# Patient Record
Sex: MALE | Race: WHITE | Employment: OTHER | URBAN - METROPOLITAN AREA
[De-identification: names, ages, dates, MRNs, and addresses within clinical notes are randomized per-mention and may not be internally consistent; named-entity substitution may affect disease eponyms.]

---

## 2024-05-05 ENCOUNTER — HOSPITAL ENCOUNTER (EMERGENCY)
Age: 79
Discharge: HOME | End: 2024-05-05
Attending: EMERGENCY MEDICINE
Payer: MEDICARE

## 2024-05-05 ENCOUNTER — APPOINTMENT (OUTPATIENT)
Age: 79
End: 2024-05-05
Payer: MEDICARE

## 2024-05-05 VITALS
HEART RATE: 88 BPM | TEMPERATURE: 98 F | BODY MASS INDEX: 35.26 KG/M2 | OXYGEN SATURATION: 90 % | DIASTOLIC BLOOD PRESSURE: 79 MMHG | WEIGHT: 199 LBS | RESPIRATION RATE: 21 BRPM | HEIGHT: 63 IN | SYSTOLIC BLOOD PRESSURE: 138 MMHG

## 2024-05-05 DIAGNOSIS — R73.9 HYPERGLYCEMIA: ICD-10-CM

## 2024-05-05 DIAGNOSIS — N39.0 ACUTE UTI: Primary | ICD-10-CM

## 2024-05-05 DIAGNOSIS — R19.7 DIARRHEA, UNSPECIFIED TYPE: ICD-10-CM

## 2024-05-05 DIAGNOSIS — R91.1 PULMONARY NODULE: ICD-10-CM

## 2024-05-05 LAB
ALBUMIN SERPL-MCNC: 4.6 G/DL (ref 3.4–5)
ALBUMIN/GLOB SERPL: 1.7 {RATIO}
ALP SERPL-CCNC: 80 U/L (ref 40–129)
ALT SERPL-CCNC: 12 U/L (ref 10–40)
ANION GAP SERPL CALCULATED.3IONS-SCNC: 15 MMOL/L (ref 3–16)
AST SERPL-CCNC: 21 U/L (ref 15–37)
BASOPHILS # BLD: 0.02 K/UL (ref 0–0.2)
BASOPHILS NFR BLD: 0 %
BILIRUB SERPL-MCNC: 2.6 MG/DL (ref 0–1)
BILIRUB UR QL STRIP: NEGATIVE
BUN SERPL-MCNC: 12 MG/DL (ref 7–20)
CALCIUM SERPL-MCNC: 9.8 MG/DL (ref 8.3–10.6)
CHARACTER UR: ABNORMAL
CHLORIDE SERPL-SCNC: 100 MMOL/L (ref 99–110)
CLARITY UR: CLEAR
CO2 SERPL-SCNC: 22 MMOL/L (ref 21–32)
COLOR UR: YELLOW
CREAT SERPL-MCNC: 0.8 MG/DL (ref 0.8–1.3)
EOSINOPHIL # BLD: 0.1 K/UL (ref 0–0.6)
EOSINOPHILS RELATIVE PERCENT: 1 %
EPI CELLS #/AREA URNS HPF: ABNORMAL /HPF
ERYTHROCYTE [DISTWIDTH] IN BLOOD BY AUTOMATED COUNT: 12.6 % (ref 12.4–15.4)
GFR, ESTIMATED: 89 ML/MIN/1.73M2
GLUCOSE SERPL-MCNC: 202 MG/DL (ref 70–99)
GLUCOSE UR STRIP-MCNC: >=1000 MG/DL
HCT VFR BLD AUTO: 44 % (ref 40.5–52.5)
HGB BLD-MCNC: 15.8 G/DL (ref 13.5–17.5)
HGB UR QL STRIP.AUTO: NEGATIVE
IMM GRANULOCYTES # BLD AUTO: 0.03 K/UL (ref 0–0.5)
IMM GRANULOCYTES NFR BLD: 0 %
KETONES UR STRIP-MCNC: 15 MG/DL
LEUKOCYTE ESTERASE UR QL STRIP: ABNORMAL
LYMPHOCYTES NFR BLD: 1.51 K/UL (ref 1–5.1)
LYMPHOCYTES RELATIVE PERCENT: 10 %
MCH RBC QN AUTO: 31.1 PG (ref 26–34)
MCHC RBC AUTO-ENTMCNC: 35.9 G/DL (ref 31–36)
MCV RBC AUTO: 86.6 FL (ref 80–100)
MONOCYTES NFR BLD: 0.92 K/UL (ref 0–1.3)
MONOCYTES NFR BLD: 6 %
NEUTROPHILS NFR BLD: 84 %
NEUTS SEG NFR BLD: 13.12 K/UL (ref 1.7–7.7)
NITRITE UR QL STRIP: NEGATIVE
PH UR STRIP: 7 [PH] (ref 5–8)
PLATELET # BLD AUTO: 324 K/UL (ref 135–450)
PMV BLD AUTO: 8.8 FL
POTASSIUM SERPL-SCNC: 3.7 MMOL/L (ref 3.5–5.1)
PROT SERPL-MCNC: 7.3 G/DL (ref 6.4–8.2)
PROT UR STRIP-MCNC: NEGATIVE MG/DL
RBC # BLD AUTO: 5.08 M/UL (ref 4.2–5.9)
RBC #/AREA URNS HPF: ABNORMAL /HPF
SODIUM SERPL-SCNC: 137 MMOL/L (ref 136–145)
SP GR UR STRIP: 1.01 (ref 1–1.03)
TROPONIN I SERPL HS-MCNC: 15 NG/L (ref 0–22)
UROBILINOGEN UR STRIP-ACNC: 1 EU/DL (ref 0–1)
WBC #/AREA URNS HPF: ABNORMAL /HPF
WBC OTHER # BLD: 15.7 K/UL (ref 4–11)

## 2024-05-05 PROCEDURE — 87086 URINE CULTURE/COLONY COUNT: CPT

## 2024-05-05 PROCEDURE — 74177 CT ABD & PELVIS W/CONTRAST: CPT

## 2024-05-05 PROCEDURE — 6360000004 HC RX CONTRAST MEDICATION: Performed by: EMERGENCY MEDICINE

## 2024-05-05 PROCEDURE — 87077 CULTURE AEROBIC IDENTIFY: CPT

## 2024-05-05 PROCEDURE — 6360000002 HC RX W HCPCS: Performed by: EMERGENCY MEDICINE

## 2024-05-05 PROCEDURE — 84484 ASSAY OF TROPONIN QUANT: CPT

## 2024-05-05 PROCEDURE — 81001 URINALYSIS AUTO W/SCOPE: CPT

## 2024-05-05 PROCEDURE — 99285 EMERGENCY DEPT VISIT HI MDM: CPT

## 2024-05-05 PROCEDURE — 96374 THER/PROPH/DIAG INJ IV PUSH: CPT

## 2024-05-05 PROCEDURE — 85025 COMPLETE CBC W/AUTO DIFF WBC: CPT

## 2024-05-05 PROCEDURE — 2580000003 HC RX 258: Performed by: EMERGENCY MEDICINE

## 2024-05-05 PROCEDURE — 71046 X-RAY EXAM CHEST 2 VIEWS: CPT

## 2024-05-05 PROCEDURE — 6370000000 HC RX 637 (ALT 250 FOR IP): Performed by: EMERGENCY MEDICINE

## 2024-05-05 PROCEDURE — 93005 ELECTROCARDIOGRAM TRACING: CPT | Performed by: EMERGENCY MEDICINE

## 2024-05-05 PROCEDURE — 80053 COMPREHEN METABOLIC PANEL: CPT

## 2024-05-05 RX ORDER — LISINOPRIL 10 MG/1
10 TABLET ORAL DAILY
COMMUNITY

## 2024-05-05 RX ORDER — SOTALOL HYDROCHLORIDE 80 MG/1
80 TABLET ORAL 2 TIMES DAILY
COMMUNITY

## 2024-05-05 RX ORDER — NIFEDIPINE 30 MG
30 TABLET, EXTENDED RELEASE ORAL DAILY
COMMUNITY

## 2024-05-05 RX ORDER — NITROFURANTOIN 25; 75 MG/1; MG/1
100 CAPSULE ORAL
Status: COMPLETED | OUTPATIENT
Start: 2024-05-05 | End: 2024-05-05

## 2024-05-05 RX ORDER — ATORVASTATIN CALCIUM 20 MG/1
20 TABLET, FILM COATED ORAL EVERY EVENING
COMMUNITY

## 2024-05-05 RX ORDER — MELOXICAM 15 MG/1
15 TABLET ORAL DAILY
COMMUNITY

## 2024-05-05 RX ORDER — GLIPIZIDE 10 MG/1
10 TABLET ORAL DAILY
COMMUNITY

## 2024-05-05 RX ORDER — NITROFURANTOIN 25; 75 MG/1; MG/1
100 CAPSULE ORAL 2 TIMES DAILY
Qty: 10 CAPSULE | Refills: 0 | Status: SHIPPED | OUTPATIENT
Start: 2024-05-05 | End: 2024-05-10

## 2024-05-05 RX ORDER — 0.9 % SODIUM CHLORIDE 0.9 %
500 INTRAVENOUS SOLUTION INTRAVENOUS ONCE
Status: COMPLETED | OUTPATIENT
Start: 2024-05-05 | End: 2024-05-05

## 2024-05-05 RX ORDER — MONTELUKAST SODIUM 10 MG/1
10 TABLET ORAL DAILY
COMMUNITY

## 2024-05-05 RX ORDER — ONDANSETRON 2 MG/ML
4 INJECTION INTRAMUSCULAR; INTRAVENOUS ONCE
Status: COMPLETED | OUTPATIENT
Start: 2024-05-05 | End: 2024-05-05

## 2024-05-05 RX ADMIN — SODIUM CHLORIDE 500 ML: 9 INJECTION, SOLUTION INTRAVENOUS at 14:04

## 2024-05-05 RX ADMIN — NITROFURANTOIN MONOHYDRATE/MACROCRYSTALS 100 MG: 25; 75 CAPSULE ORAL at 14:26

## 2024-05-05 RX ADMIN — ONDANSETRON 4 MG: 2 INJECTION INTRAMUSCULAR; INTRAVENOUS at 14:05

## 2024-05-05 RX ADMIN — IOPAMIDOL 75 ML: 755 INJECTION, SOLUTION INTRAVENOUS at 14:36

## 2024-05-05 ASSESSMENT — PAIN SCALES - GENERAL
PAINLEVEL_OUTOF10: 8
PAINLEVEL_OUTOF10: 4

## 2024-05-05 ASSESSMENT — PAIN DESCRIPTION - DESCRIPTORS: DESCRIPTORS: DISCOMFORT

## 2024-05-05 ASSESSMENT — PAIN DESCRIPTION - LOCATION: LOCATION: GENERALIZED

## 2024-05-05 ASSESSMENT — PAIN - FUNCTIONAL ASSESSMENT: PAIN_FUNCTIONAL_ASSESSMENT: 0-10

## 2024-05-05 NOTE — PROGRESS NOTES
[facility]    Pharmacy Progress Note    Admit Date: 5/5/2024    List of of current medications patient is taking is in progress. Home Medication list in EPIC updated to reflect changes noted below.    Source of information: Care Everywhere and Dispense Fill History     Patient's home pharmacy: [facility] ([phone])    Changes made to medication list:   Medications removed: (include reason, ex: therapy completed, patient no longer taking, etc.)  None  Medications added:   Atorvastatin  Glipizide  Lisinopril  Meloxicam  Montelukast  Metformin  Nifedipine  Rivaroxaban  Sotalol  Medication doses adjusted:   None  Other notes:   All medications added had recent fill history and were documented in office visit summary from [facility] ([facility]) on 04/16/24    Current Outpatient Medications   Medication Instructions    atorvastatin (LIPITOR) 20 mg, Oral, EVERY EVENING    glipiZIDE (GLUCOTROL) 10 mg, Oral, DAILY    lisinopril (PRINIVIL;ZESTRIL) 10 mg, Oral, DAILY    meloxicam (MOBIC) 15 mg, Oral, DAILY    metFORMIN (GLUCOPHAGE) 1,000 mg, Oral, DAILY    montelukast (SINGULAIR) 10 mg, Oral, DAILY    NIFEdipine (ADALAT CC) 30 mg, Oral, DAILY    rivaroxaban (XARELTO) 20 mg, Oral, DAILY WITH DINNER    sotalol (BETAPACE) 80 mg, Oral, 2 TIMES DAILY       Please call with any questions.    Babatunde Marion, Dino  [facility]   [id number]  5/5/2024   2:09 PM

## 2024-05-05 NOTE — ED PROVIDER NOTES
[facility] EMERGENCY DEPT     EMERGENCY DEPARTMENT ENCOUNTER            Pt Name: Osmar Mckenna   MRN: [id number]   Birthdate 1945   Date of evaluation: 5/5/2024   Provider: Ted Tolentino MD   PCP: Masoud Garcia MD   Note Started: 2:21 PM EDT 5/5/24          CHIEF COMPLAINT     Chief Complaint   Patient presents with    Chest Pain     Chest/back pain, dizziness, and diarrhea x 2 weeks.              HISTORY OF PRESENT ILLNESS:   History from : Patient   Limitations to history : None     Osmar Mckenna is a 79 y.o. male who presents to the emergency department with right lower quadrant pain, malaise and fatigue, intermittent diarrhea and chronic complaints of chest and back pain.  He is accompanied by his wife who states that the biggest concern is related to his abdomen as he has had some sort of bowel related issues in the past.  He reports no fever or chills.  No nausea or vomiting.  He has had a few episodes of nonbloody voluminous diarrhea.  He reports feeling hot and having chills but has not taken his temperature.    Nursing Notes were all reviewed and agreed with, or any disagreements were addressed in the HPI.     Review of Systems   Constitutional:  Positive for chills and fatigue.   HENT: Negative.     Eyes: Negative.    Respiratory: Negative.     Cardiovascular:  Positive for chest pain.   Gastrointestinal:  Positive for diarrhea. Negative for abdominal pain.   Endocrine: Negative.    Genitourinary: Negative.    Musculoskeletal: Negative.    Neurological: Negative.    All other systems reviewed and are negative.        MEDICAL HISTORY   has a past medical history of Diabetes mellitus (HCC) and Hypertension.    Past Surgical History:   Procedure Laterality Date    PACEMAKER PLACEMENT        CURRENTMEDICATIONS       Previous Medications    ATORVASTATIN (LIPITOR) 20 MG TABLET    Take 1 tablet by mouth every evening    GLIPIZIDE (GLUCOTROL) 10 MG TABLET    Take 1 tablet by

## 2024-05-05 NOTE — DISCHARGE INSTRUCTIONS
Be sure to contact your primary care physician about the pulmonary nodule follow-up we discovered today.

## 2024-05-06 LAB
EKG ATRIAL RATE: 100 BPM
EKG DIAGNOSIS: NORMAL
EKG P AXIS: 107 DEGREES
EKG P-R INTERVAL: 210 MS
EKG Q-T INTERVAL: 384 MS
EKG QRS DURATION: 94 MS
EKG QTC CALCULATION (BAZETT): 495 MS
EKG R AXIS: -69 DEGREES
EKG T AXIS: 48 DEGREES
EKG VENTRICULAR RATE: 100 BPM

## 2024-05-07 LAB
MICROORGANISM SPEC CULT: ABNORMAL
SPECIMEN DESCRIPTION: ABNORMAL

## 2024-06-20 ENCOUNTER — PROCEDURE VISIT (OUTPATIENT)
Age: 79
End: 2024-06-20

## 2024-06-20 DIAGNOSIS — H91.90 HEARING LOSS, UNSPECIFIED HEARING LOSS TYPE, UNSPECIFIED LATERALITY: Primary | ICD-10-CM

## 2024-06-20 PROCEDURE — 92593 PR HEARING AID CHECK BINAURAL: CPT

## 2024-06-20 NOTE — PROGRESS NOTES
have been due to them being dirty. Explained that it could also be due to an intermittent issue with the receivers, or the hearing aids themselves. Discussed that the patient has the following options:   Make new ear molds. His receivers are integrated in the molds, so to replace the receivers, we would need new ear molds. The intermittency could be due to a malfunction of the receivers. Considering we do not have previous impressions, we would need to make brand new molds. Patient quoted $250 ($100 for impressions, $75 for each mold)  Send the hearing aids in for an out-of-warranty repair under 3 years old. The intermittency could be due to a malfunction of the devices. Patient was quoted $290 per device.   Purchase all new hearing aids. To obtain the equivalent version of his hearing aids with new molds, patient was quoted $3250 (hearing aids, impressions and molds). Explained that with new devices, there would be a new 3 year warranty.     Finally, patient was encouraged to get an updated hearing test. If he were to purchase new devices, he would need a new hearing test in order to fit them.     PATIENT EDUCATION:     - Verbally and visually reviewed importance of consistent use and care and maintenance of devices.      Information was verbally shared with patient during appointment.        RECOMMENDATIONS:     Continue consistent hearing aid use  Return for a hearing aid check as needed.  Retest hearing as medically indicated and/or sooner if a change in hearing is noted.  Contact audiologist with questions/concerns as needed      **Patient paid $200 for today's appointment.      Gloria Vanessa  Audiologist

## 2024-09-22 ENCOUNTER — HOSPITAL ENCOUNTER (EMERGENCY)
Age: 79
Discharge: HOME | End: 2024-09-22
Attending: EMERGENCY MEDICINE
Payer: MEDICARE

## 2024-09-22 ENCOUNTER — APPOINTMENT (OUTPATIENT)
Age: 79
End: 2024-09-22
Payer: MEDICARE

## 2024-09-22 VITALS
HEART RATE: 62 BPM | RESPIRATION RATE: 16 BRPM | DIASTOLIC BLOOD PRESSURE: 79 MMHG | OXYGEN SATURATION: 97 % | TEMPERATURE: 98.6 F | WEIGHT: 194 LBS | HEIGHT: 63 IN | BODY MASS INDEX: 34.38 KG/M2 | SYSTOLIC BLOOD PRESSURE: 128 MMHG

## 2024-09-22 DIAGNOSIS — S00.81XA ABRASION OF FACE, INITIAL ENCOUNTER: Primary | ICD-10-CM

## 2024-09-22 PROCEDURE — 99284 EMERGENCY DEPT VISIT MOD MDM: CPT

## 2024-09-22 PROCEDURE — 70486 CT MAXILLOFACIAL W/O DYE: CPT

## 2024-09-22 PROCEDURE — 70450 CT HEAD/BRAIN W/O DYE: CPT

## 2025-01-25 ENCOUNTER — HOSPITAL ENCOUNTER (OUTPATIENT)
Age: 80
Setting detail: OBSERVATION
Discharge: HOME | End: 2025-01-26
Attending: EMERGENCY MEDICINE | Admitting: STUDENT IN AN ORGANIZED HEALTH CARE EDUCATION/TRAINING PROGRAM
Payer: MEDICARE

## 2025-01-25 ENCOUNTER — APPOINTMENT (OUTPATIENT)
Age: 80
End: 2025-01-25
Payer: MEDICARE

## 2025-01-25 DIAGNOSIS — E86.0 DEHYDRATION: ICD-10-CM

## 2025-01-25 DIAGNOSIS — R19.7 NAUSEA VOMITING AND DIARRHEA: Primary | ICD-10-CM

## 2025-01-25 DIAGNOSIS — R11.2 NAUSEA VOMITING AND DIARRHEA: Primary | ICD-10-CM

## 2025-01-25 DIAGNOSIS — R91.8 LUNG NODULES: ICD-10-CM

## 2025-01-25 PROBLEM — K52.9 ACUTE GASTROENTERITIS: Status: ACTIVE | Noted: 2025-01-25

## 2025-01-25 LAB
ALBUMIN SERPL-MCNC: 4.7 G/DL (ref 3.4–5)
ALBUMIN/GLOB SERPL: 1.6 {RATIO}
ALP SERPL-CCNC: 77 U/L (ref 40–129)
ALT SERPL-CCNC: 25 U/L (ref 10–40)
ANION GAP SERPL CALCULATED.3IONS-SCNC: 18 MMOL/L (ref 3–16)
AST SERPL-CCNC: 38 U/L (ref 15–37)
BACTERIA URNS QL MICRO: ABNORMAL
BASOPHILS # BLD: 0.01 K/UL (ref 0–0.2)
BASOPHILS NFR BLD: 0 %
BILIRUB SERPL-MCNC: 2.5 MG/DL (ref 0–1)
BILIRUB UR QL STRIP: ABNORMAL
BUN SERPL-MCNC: 23 MG/DL (ref 7–20)
CALCIUM SERPL-MCNC: 9.6 MG/DL (ref 8.3–10.6)
CHARACTER UR: ABNORMAL
CHLORIDE SERPL-SCNC: 104 MMOL/L (ref 99–110)
CLARITY UR: CLEAR
CO2 SERPL-SCNC: 18 MMOL/L (ref 21–32)
COLOR UR: YELLOW
CREAT SERPL-MCNC: 1 MG/DL (ref 0.8–1.3)
EOSINOPHIL # BLD: 0.12 K/UL (ref 0–0.6)
EOSINOPHILS RELATIVE PERCENT: 1 %
ERYTHROCYTE [DISTWIDTH] IN BLOOD BY AUTOMATED COUNT: 12.5 % (ref 12.4–15.4)
GFR, ESTIMATED: 73 ML/MIN/1.73M2
GLUCOSE BLD-MCNC: 156 MG/DL (ref 70–99)
GLUCOSE SERPL-MCNC: 166 MG/DL (ref 70–99)
GLUCOSE UR STRIP-MCNC: >=1000 MG/DL
HCT VFR BLD AUTO: 48.6 % (ref 40.5–52.5)
HGB BLD-MCNC: 16.8 G/DL (ref 13.5–17.5)
HGB UR QL STRIP.AUTO: ABNORMAL
IMM GRANULOCYTES # BLD AUTO: 0.02 K/UL (ref 0–0.5)
IMM GRANULOCYTES NFR BLD: 0 %
INR PPP: 1.3 (ref 0.9–1.2)
KETONES UR STRIP-MCNC: >80 MG/DL
LACTATE BLDV-SCNC: 2.1 MMOL/L (ref 0.4–1.9)
LACTATE BLDV-SCNC: 2.3 MMOL/L (ref 0.4–1.9)
LEUKOCYTE ESTERASE UR QL STRIP: NEGATIVE
LIPASE SERPL-CCNC: 24 U/L (ref 13–60)
LYMPHOCYTES NFR BLD: 0.64 K/UL (ref 1–5.1)
LYMPHOCYTES RELATIVE PERCENT: 5 %
MCH RBC QN AUTO: 30.4 PG (ref 26–34)
MCHC RBC AUTO-ENTMCNC: 34.6 G/DL (ref 31–36)
MCV RBC AUTO: 88 FL (ref 80–100)
MONOCYTES NFR BLD: 0.42 K/UL (ref 0–1.3)
MONOCYTES NFR BLD: 3 %
NEUTROPHILS NFR BLD: 91 %
NEUTS SEG NFR BLD: 11.78 K/UL (ref 1.7–7.7)
NITRITE UR QL STRIP: NEGATIVE
PH UR STRIP: 6 [PH] (ref 5–8)
PLATELET # BLD AUTO: 346 K/UL (ref 135–450)
PMV BLD AUTO: 8.8 FL (ref 9.4–12.4)
POTASSIUM SERPL-SCNC: 4.1 MMOL/L (ref 3.5–5.1)
PROT SERPL-MCNC: 7.7 G/DL (ref 6.4–8.2)
PROT UR STRIP-MCNC: 30 MG/DL
PROTHROMBIN TIME: 16.6 SEC (ref 11.9–14.9)
RBC # BLD AUTO: 5.52 M/UL (ref 4.2–5.9)
RBC #/AREA URNS HPF: ABNORMAL /HPF
SODIUM SERPL-SCNC: 140 MMOL/L (ref 136–145)
SP GR UR STRIP: >1.03 (ref 1–1.03)
UROBILINOGEN UR STRIP-ACNC: 0.2 EU/DL (ref 0–1)
WBC #/AREA URNS HPF: ABNORMAL /HPF
WBC OTHER # BLD: 13 K/UL (ref 4–11)

## 2025-01-25 PROCEDURE — 74177 CT ABD & PELVIS W/CONTRAST: CPT

## 2025-01-25 PROCEDURE — 2580000003 HC RX 258: Performed by: EMERGENCY MEDICINE

## 2025-01-25 PROCEDURE — 96374 THER/PROPH/DIAG INJ IV PUSH: CPT

## 2025-01-25 PROCEDURE — 96361 HYDRATE IV INFUSION ADD-ON: CPT

## 2025-01-25 PROCEDURE — 83690 ASSAY OF LIPASE: CPT

## 2025-01-25 PROCEDURE — 85025 COMPLETE CBC W/AUTO DIFF WBC: CPT

## 2025-01-25 PROCEDURE — 6360000002 HC RX W HCPCS: Performed by: EMERGENCY MEDICINE

## 2025-01-25 PROCEDURE — G0378 HOSPITAL OBSERVATION PER HR: HCPCS

## 2025-01-25 PROCEDURE — 80053 COMPREHEN METABOLIC PANEL: CPT

## 2025-01-25 PROCEDURE — 2580000003 HC RX 258: Performed by: STUDENT IN AN ORGANIZED HEALTH CARE EDUCATION/TRAINING PROGRAM

## 2025-01-25 PROCEDURE — 81001 URINALYSIS AUTO W/SCOPE: CPT

## 2025-01-25 PROCEDURE — 2500000003 HC RX 250 WO HCPCS: Performed by: STUDENT IN AN ORGANIZED HEALTH CARE EDUCATION/TRAINING PROGRAM

## 2025-01-25 PROCEDURE — 71045 X-RAY EXAM CHEST 1 VIEW: CPT

## 2025-01-25 PROCEDURE — 6370000000 HC RX 637 (ALT 250 FOR IP): Performed by: EMERGENCY MEDICINE

## 2025-01-25 PROCEDURE — 6370000000 HC RX 637 (ALT 250 FOR IP): Performed by: STUDENT IN AN ORGANIZED HEALTH CARE EDUCATION/TRAINING PROGRAM

## 2025-01-25 PROCEDURE — 85610 PROTHROMBIN TIME: CPT

## 2025-01-25 PROCEDURE — 82962 GLUCOSE BLOOD TEST: CPT

## 2025-01-25 PROCEDURE — 99285 EMERGENCY DEPT VISIT HI MDM: CPT

## 2025-01-25 PROCEDURE — 6360000004 HC RX CONTRAST MEDICATION: Performed by: EMERGENCY MEDICINE

## 2025-01-25 PROCEDURE — 83605 ASSAY OF LACTIC ACID: CPT

## 2025-01-25 RX ORDER — ENOXAPARIN SODIUM 100 MG/ML
40 INJECTION SUBCUTANEOUS DAILY
Status: DISCONTINUED | OUTPATIENT
Start: 2025-01-25 | End: 2025-01-25

## 2025-01-25 RX ORDER — LISINOPRIL 10 MG/1
10 TABLET ORAL DAILY
Status: DISCONTINUED | OUTPATIENT
Start: 2025-01-25 | End: 2025-01-26 | Stop reason: HOSPADM

## 2025-01-25 RX ORDER — MAGNESIUM SULFATE IN WATER 40 MG/ML
2000 INJECTION, SOLUTION INTRAVENOUS PRN
Status: DISCONTINUED | OUTPATIENT
Start: 2025-01-25 | End: 2025-01-26 | Stop reason: HOSPADM

## 2025-01-25 RX ORDER — ONDANSETRON 4 MG/1
4 TABLET, ORALLY DISINTEGRATING ORAL EVERY 8 HOURS PRN
Status: DISCONTINUED | OUTPATIENT
Start: 2025-01-25 | End: 2025-01-26 | Stop reason: HOSPADM

## 2025-01-25 RX ORDER — 0.9 % SODIUM CHLORIDE 0.9 %
1000 INTRAVENOUS SOLUTION INTRAVENOUS ONCE
Status: COMPLETED | OUTPATIENT
Start: 2025-01-25 | End: 2025-01-25

## 2025-01-25 RX ORDER — PROMETHAZINE HYDROCHLORIDE 25 MG/1
25 TABLET ORAL EVERY 6 HOURS PRN
Status: DISCONTINUED | OUTPATIENT
Start: 2025-01-25 | End: 2025-01-26 | Stop reason: HOSPADM

## 2025-01-25 RX ORDER — ATORVASTATIN CALCIUM 20 MG/1
20 TABLET, FILM COATED ORAL EVERY EVENING
Status: DISCONTINUED | OUTPATIENT
Start: 2025-01-25 | End: 2025-01-26 | Stop reason: HOSPADM

## 2025-01-25 RX ORDER — MONTELUKAST SODIUM 10 MG/1
10 TABLET ORAL DAILY
Status: DISCONTINUED | OUTPATIENT
Start: 2025-01-25 | End: 2025-01-26 | Stop reason: HOSPADM

## 2025-01-25 RX ORDER — NIFEDIPINE 30 MG
30 TABLET, EXTENDED RELEASE ORAL DAILY
Status: DISCONTINUED | OUTPATIENT
Start: 2025-01-26 | End: 2025-01-26 | Stop reason: HOSPADM

## 2025-01-25 RX ORDER — POTASSIUM CHLORIDE 7.45 MG/ML
10 INJECTION INTRAVENOUS PRN
Status: DISCONTINUED | OUTPATIENT
Start: 2025-01-25 | End: 2025-01-26 | Stop reason: HOSPADM

## 2025-01-25 RX ORDER — ONDANSETRON 2 MG/ML
4 INJECTION INTRAMUSCULAR; INTRAVENOUS
Status: DISCONTINUED | OUTPATIENT
Start: 2025-01-25 | End: 2025-01-25

## 2025-01-25 RX ORDER — ACETAMINOPHEN 650 MG/1
650 SUPPOSITORY RECTAL EVERY 6 HOURS PRN
Status: DISCONTINUED | OUTPATIENT
Start: 2025-01-25 | End: 2025-01-26 | Stop reason: HOSPADM

## 2025-01-25 RX ORDER — SODIUM CHLORIDE 9 MG/ML
INJECTION, SOLUTION INTRAVENOUS PRN
Status: DISCONTINUED | OUTPATIENT
Start: 2025-01-25 | End: 2025-01-26 | Stop reason: HOSPADM

## 2025-01-25 RX ORDER — DICYCLOMINE HYDROCHLORIDE 10 MG/1
10 CAPSULE ORAL ONCE
Status: COMPLETED | OUTPATIENT
Start: 2025-01-25 | End: 2025-01-25

## 2025-01-25 RX ORDER — GLUCAGON 1 MG/ML
1 KIT INJECTION PRN
Status: DISCONTINUED | OUTPATIENT
Start: 2025-01-25 | End: 2025-01-26 | Stop reason: HOSPADM

## 2025-01-25 RX ORDER — SODIUM CHLORIDE 0.9 % (FLUSH) 0.9 %
5-40 SYRINGE (ML) INJECTION PRN
Status: DISCONTINUED | OUTPATIENT
Start: 2025-01-25 | End: 2025-01-26 | Stop reason: HOSPADM

## 2025-01-25 RX ORDER — SODIUM CHLORIDE, SODIUM LACTATE, POTASSIUM CHLORIDE, CALCIUM CHLORIDE 600; 310; 30; 20 MG/100ML; MG/100ML; MG/100ML; MG/100ML
INJECTION, SOLUTION INTRAVENOUS CONTINUOUS
Status: DISCONTINUED | OUTPATIENT
Start: 2025-01-25 | End: 2025-01-26 | Stop reason: HOSPADM

## 2025-01-25 RX ORDER — POLYETHYLENE GLYCOL 3350 17 G/17G
17 POWDER, FOR SOLUTION ORAL DAILY PRN
Status: DISCONTINUED | OUTPATIENT
Start: 2025-01-25 | End: 2025-01-26 | Stop reason: HOSPADM

## 2025-01-25 RX ORDER — ONDANSETRON 2 MG/ML
4 INJECTION INTRAMUSCULAR; INTRAVENOUS EVERY 6 HOURS PRN
Status: DISCONTINUED | OUTPATIENT
Start: 2025-01-25 | End: 2025-01-25

## 2025-01-25 RX ORDER — SODIUM CHLORIDE 0.9 % (FLUSH) 0.9 %
5-40 SYRINGE (ML) INJECTION EVERY 12 HOURS SCHEDULED
Status: DISCONTINUED | OUTPATIENT
Start: 2025-01-25 | End: 2025-01-26 | Stop reason: HOSPADM

## 2025-01-25 RX ORDER — DEXTROSE MONOHYDRATE 100 MG/ML
INJECTION, SOLUTION INTRAVENOUS CONTINUOUS PRN
Status: DISCONTINUED | OUTPATIENT
Start: 2025-01-25 | End: 2025-01-26 | Stop reason: HOSPADM

## 2025-01-25 RX ORDER — ACETAMINOPHEN 325 MG/1
650 TABLET ORAL EVERY 6 HOURS PRN
Status: DISCONTINUED | OUTPATIENT
Start: 2025-01-25 | End: 2025-01-26 | Stop reason: HOSPADM

## 2025-01-25 RX ORDER — SOTALOL HYDROCHLORIDE 80 MG/1
80 TABLET ORAL 2 TIMES DAILY
Status: DISCONTINUED | OUTPATIENT
Start: 2025-01-25 | End: 2025-01-26 | Stop reason: HOSPADM

## 2025-01-25 RX ORDER — POTASSIUM CHLORIDE 1500 MG/1
40 TABLET, EXTENDED RELEASE ORAL PRN
Status: DISCONTINUED | OUTPATIENT
Start: 2025-01-25 | End: 2025-01-26 | Stop reason: HOSPADM

## 2025-01-25 RX ORDER — IOPAMIDOL 755 MG/ML
75 INJECTION, SOLUTION INTRAVASCULAR
Status: COMPLETED | OUTPATIENT
Start: 2025-01-25 | End: 2025-01-25

## 2025-01-25 RX ORDER — INSULIN LISPRO 100 [IU]/ML
0-4 INJECTION, SOLUTION INTRAVENOUS; SUBCUTANEOUS
Status: DISCONTINUED | OUTPATIENT
Start: 2025-01-25 | End: 2025-01-26 | Stop reason: HOSPADM

## 2025-01-25 RX ADMIN — MONTELUKAST 10 MG: 10 TABLET, FILM COATED ORAL at 22:53

## 2025-01-25 RX ADMIN — ONDANSETRON 4 MG: 2 INJECTION, SOLUTION INTRAMUSCULAR; INTRAVENOUS at 16:45

## 2025-01-25 RX ADMIN — DICYCLOMINE HYDROCHLORIDE 10 MG: 10 CAPSULE ORAL at 16:46

## 2025-01-25 RX ADMIN — SODIUM CHLORIDE 1000 ML: 9 INJECTION, SOLUTION INTRAVENOUS at 17:51

## 2025-01-25 RX ADMIN — IOPAMIDOL 75 ML: 755 INJECTION, SOLUTION INTRAVENOUS at 16:58

## 2025-01-25 RX ADMIN — SODIUM CHLORIDE, POTASSIUM CHLORIDE, SODIUM LACTATE AND CALCIUM CHLORIDE: 600; 310; 30; 20 INJECTION, SOLUTION INTRAVENOUS at 22:40

## 2025-01-25 RX ADMIN — RIVAROXABAN 20 MG: 20 TABLET, FILM COATED ORAL at 22:53

## 2025-01-25 RX ADMIN — SOTALOL HYDROCHLORIDE 80 MG: 80 TABLET ORAL at 22:53

## 2025-01-25 RX ADMIN — ATORVASTATIN CALCIUM 20 MG: 20 TABLET, FILM COATED ORAL at 22:53

## 2025-01-25 RX ADMIN — Medication 5 ML: at 22:38

## 2025-01-25 RX ADMIN — SODIUM CHLORIDE 1000 ML: 9 INJECTION, SOLUTION INTRAVENOUS at 16:44

## 2025-01-25 ASSESSMENT — LIFESTYLE VARIABLES
HOW OFTEN DO YOU HAVE A DRINK CONTAINING ALCOHOL: NEVER
HOW MANY STANDARD DRINKS CONTAINING ALCOHOL DO YOU HAVE ON A TYPICAL DAY: PATIENT DOES NOT DRINK

## 2025-01-25 ASSESSMENT — PAIN - FUNCTIONAL ASSESSMENT: PAIN_FUNCTIONAL_ASSESSMENT: NONE - DENIES PAIN

## 2025-01-25 ASSESSMENT — PAIN SCALES - GENERAL: PAINLEVEL_OUTOF10: 5

## 2025-01-25 ASSESSMENT — PAIN DESCRIPTION - LOCATION: LOCATION: ABDOMEN

## 2025-01-25 ASSESSMENT — PAIN DESCRIPTION - DESCRIPTORS: DESCRIPTORS: CRAMPING

## 2025-01-25 NOTE — ED NOTES
ED to Inpatient Handoff SBAR    Patient Name: Osmar Mckenna   :  1945  79 y.o.   MRN:  [id number]  Preferred Name    ED Room #:    Family/Caregiver Present no     Chief Complaint Vomiting and Diarrhea (For 24 hours)       Restraints no   Sitter no   Sepsis Risk Score    Isolation No active isolations   Fall Risk Assessment Presents to emergency department  because of falls (Syncope, seizure, or loss of consciousness): No, Age > 70: Yes, Altered Mental Status, Intoxication with alcohol or substance confusion (Disorientation, impaired judgment, poor safety awaremess, or inability to follow instructions): No, Impaired Mobility: Ambulates or transfers with assistive devices or assistance; Unable to ambulate or transer.: No, Nursing Judgement: No     Situation  Code Status: Full Code No additional code details.    Allergies: Penicillins  Weight: Patient Vitals for the past 96 hrs (Last 3 readings):   Weight   25 1621 87.5 kg (193 lb)     Arrived from: home  Hospital Problem/Diagnosis:  Principal Problem:    Acute gastroenteritis  Resolved Problems:    * No resolved hospital problems. *    Imaging:   XR CHEST PORTABLE   Final Result   Impression:   1. No airspace disease.      Electronically signed by Chaim Santo MD      CT ABDOMEN PELVIS W IV CONTRAST Additional Contrast? None   Final Result   1. There is evidence of pleural nodules right middle lobe and right lower lobe anterobasal segment with effusion.   2. Bilateral small pleural effusions.   3. Metastatic disease is suspected.   4. Fluid in the colon with what appears to be transition zone in the transverse colon. Retained stool precludes accurate detection of colonic lesions. Further evaluation with colonoscopy is suggested in the appropriate clinical setting      Electronically signed by Aquilino Wagner MD        Abnormal labs:   Abnormal Labs Reviewed   CBC WITH AUTO DIFFERENTIAL - Abnormal; Notable for the following components:

## 2025-01-25 NOTE — ED PROVIDER NOTES
[facility] Emergency Department [facility], Solomon Dang MD, am the primary clinician of record.    CHIEF COMPLAINT  Chief Complaint   Patient presents with    Vomiting    Diarrhea     For 24 hours        HISTORY OF PRESENT ILLNESS  Osmar Mckenna is a 79 y.o. male  who presents to the ED complaining of concern for about 24 hours of so of nausea vomiting diarrhea and generalized abdominal cramping.  The patient has not had a measured fever.  He does feel very dehydrated and unable to keep anything down.  His wife had similar symptoms earlier in the week that are improving.  His wife coincidentally is actually here with a separate unrelated injury and so the patient decided he was sick enough and he wanted to be evaluated as well.  Denies any unusual or uncooked foods that he is definitively aware of, and no urinary symptoms chest pain or shortness of breath.    No other complaints, modifying factors or associated symptoms.     I have reviewed the following from the nursing documentation.    Past Medical History:   Diagnosis Date    Diabetes mellitus (HCC)     Hypertension      Past Surgical History:   Procedure Laterality Date    PACEMAKER PLACEMENT       History reviewed. No pertinent family history.  Social History     Socioeconomic History    Marital status: Single     Spouse name: Not on file    Number of children: Not on file    Years of education: Not on file    Highest education level: Not on file   Occupational History    Not on file   Tobacco Use    Smoking status: Never    Smokeless tobacco: Never   Substance and Sexual Activity    Alcohol use: Not Currently    Drug use: Not Currently    Sexual activity: Not on file   Other Topics Concern    Not on file   Social History Narrative    Not on file     Social Determinants of Health     Financial Resource Strain: Patient Declined (3/27/2023)    Received from [email or web address] [email or web address]    Overall Financial Resource Strain (CARDIA)

## 2025-01-25 NOTE — H&P
Hospital Medicine History & Physical      Date of Admission: 1/25/2025    Date of Service:  Pt seen/examined on 1/25/2025    [x]Admitted to Inpatient with expected LOS greater than two midnights due to medical therapy.  []Placed in Observation status.    Chief Admission Complaint: Intractable nausea and vomiting with diarrhea    Presenting Admission History:      79 y.o. male who presented to [facility] with concerns for acute gastroenteritis.  PMHx significant for atrial flutter, hypertension, PAF, chronic HFpEF, status post dual-chamber pacemaker, history of DVT, type 2 diabetes.      Patient reports that he started getting intractable nausea and vomiting as well as yellow diarrhea since day prior to admission.  Says he has been going almost nonstop since then and has been unable to keep any food or liquids down.  Said anytime he drinks or eats, he will have diarrhea and vomiting.  Denies any chest pain shortness of breath.  Denies fevers, chills.  His wife who is also admitted today also has similar symptoms.  No other acute complaints.    In the ED, vital signs stable except for tachycardia to 102.  Blood pressure was 146/107.  BMP largely unremarkable except for bicarb of 18 with anion gap of 18, lactic acid 2.1, glucose 166.  LFTs showed elevated T. bili at 2.5.  CBC revealed white blood cell count 13.0, otherwise unremarkable.  UA largely unremarkable but did show glucose and positive ketones    Chest x-ray was performed and was unremarkable.  CT abdomen pelvis with evidence of pleural nodules in the right middle lobe and right lower lobe anterobasal segment effusion as well as bilateral small pleural effusions.  Metastatic disease is suspected.  CT also showed fluid in the colon with what appears to be transition zone in the transverse colon but retained stool precludes accurate detection of colonic lesions.    Assessment/Plan:      Current Principal Problem:  Acute gastroenteritis    #Acute

## 2025-01-26 VITALS
HEART RATE: 78 BPM | OXYGEN SATURATION: 97 % | WEIGHT: 189.49 LBS | HEIGHT: 64 IN | TEMPERATURE: 97.3 F | BODY MASS INDEX: 32.35 KG/M2 | RESPIRATION RATE: 16 BRPM | DIASTOLIC BLOOD PRESSURE: 68 MMHG | SYSTOLIC BLOOD PRESSURE: 117 MMHG

## 2025-01-26 PROBLEM — R91.8 LUNG NODULES: Status: ACTIVE | Noted: 2025-01-26

## 2025-01-26 LAB
ANION GAP SERPL CALCULATED.3IONS-SCNC: 10 MMOL/L (ref 3–16)
BASOPHILS # BLD: 0.01 K/UL (ref 0–0.2)
BASOPHILS NFR BLD: 0 %
BUN SERPL-MCNC: 17 MG/DL (ref 7–20)
CALCIUM SERPL-MCNC: 8.1 MG/DL (ref 8.3–10.6)
CHLORIDE SERPL-SCNC: 107 MMOL/L (ref 99–110)
CO2 SERPL-SCNC: 21 MMOL/L (ref 21–32)
CREAT SERPL-MCNC: 0.9 MG/DL (ref 0.8–1.3)
EOSINOPHIL # BLD: 0.17 K/UL (ref 0–0.6)
EOSINOPHILS RELATIVE PERCENT: 2 %
ERYTHROCYTE [DISTWIDTH] IN BLOOD BY AUTOMATED COUNT: 12.7 % (ref 12.4–15.4)
EST. AVERAGE GLUCOSE BLD GHB EST-MCNC: 148 MG/DL
GFR, ESTIMATED: 87 ML/MIN/1.73M2
GLUCOSE BLD-MCNC: 147 MG/DL (ref 70–99)
GLUCOSE BLD-MCNC: 217 MG/DL (ref 70–99)
GLUCOSE SERPL-MCNC: 180 MG/DL (ref 70–99)
HBA1C MFR BLD: 6.8 %
HCT VFR BLD AUTO: 39.1 % (ref 40.5–52.5)
HGB BLD-MCNC: 13.8 G/DL (ref 13.5–17.5)
IMM GRANULOCYTES # BLD AUTO: 0.03 K/UL (ref 0–0.5)
IMM GRANULOCYTES NFR BLD: 0 %
LYMPHOCYTES NFR BLD: 0.84 K/UL (ref 1–5.1)
LYMPHOCYTES RELATIVE PERCENT: 11 %
MCH RBC QN AUTO: 31.2 PG (ref 26–34)
MCHC RBC AUTO-ENTMCNC: 35.3 G/DL (ref 31–36)
MCV RBC AUTO: 88.3 FL (ref 80–100)
MONOCYTES NFR BLD: 0.6 K/UL (ref 0–1.3)
MONOCYTES NFR BLD: 8 %
NEUTROPHILS NFR BLD: 78 %
NEUTS SEG NFR BLD: 5.69 K/UL (ref 1.7–7.7)
PLATELET # BLD AUTO: 292 K/UL (ref 135–450)
PMV BLD AUTO: 9.1 FL
POTASSIUM SERPL-SCNC: 3.6 MMOL/L (ref 3.5–5.1)
RBC # BLD AUTO: 4.43 M/UL (ref 4.2–5.9)
SODIUM SERPL-SCNC: 138 MMOL/L (ref 136–145)
WBC OTHER # BLD: 7.3 K/UL (ref 4–11)

## 2025-01-26 PROCEDURE — G0378 HOSPITAL OBSERVATION PER HR: HCPCS

## 2025-01-26 PROCEDURE — 99232 SBSQ HOSP IP/OBS MODERATE 35: CPT | Performed by: NURSE PRACTITIONER

## 2025-01-26 PROCEDURE — 2500000003 HC RX 250 WO HCPCS: Performed by: STUDENT IN AN ORGANIZED HEALTH CARE EDUCATION/TRAINING PROGRAM

## 2025-01-26 PROCEDURE — 2580000003 HC RX 258: Performed by: STUDENT IN AN ORGANIZED HEALTH CARE EDUCATION/TRAINING PROGRAM

## 2025-01-26 PROCEDURE — 96361 HYDRATE IV INFUSION ADD-ON: CPT

## 2025-01-26 PROCEDURE — 6370000000 HC RX 637 (ALT 250 FOR IP): Performed by: STUDENT IN AN ORGANIZED HEALTH CARE EDUCATION/TRAINING PROGRAM

## 2025-01-26 RX ADMIN — NIFEDIPINE 30 MG: 30 TABLET, EXTENDED RELEASE ORAL at 08:11

## 2025-01-26 RX ADMIN — SOTALOL HYDROCHLORIDE 80 MG: 80 TABLET ORAL at 08:11

## 2025-01-26 RX ADMIN — MONTELUKAST 10 MG: 10 TABLET, FILM COATED ORAL at 08:11

## 2025-01-26 RX ADMIN — SODIUM CHLORIDE, POTASSIUM CHLORIDE, SODIUM LACTATE AND CALCIUM CHLORIDE: 600; 310; 30; 20 INJECTION, SOLUTION INTRAVENOUS at 08:13

## 2025-01-26 RX ADMIN — INSULIN LISPRO 1 UNITS: 100 INJECTION, SOLUTION INTRAVENOUS; SUBCUTANEOUS at 12:28

## 2025-01-26 RX ADMIN — Medication 10 ML: at 08:11

## 2025-01-26 ASSESSMENT — PAIN SCALES - GENERAL: PAINLEVEL_OUTOF10: 0

## 2025-01-26 NOTE — PROGRESS NOTES
Patient educated on discharge instructions as well as new medications use, dosage, administration and possible side effects.  Patient verified knowledge. IV removed without difficulty and dry dressing in place. Telemetry monitor removed and returned to CMU. Pt requested to go to significant others room down watson declined to be wheeled down the watson to significant others room. Writer walked with patient down the watson to other room, patient in stable condition.

## 2025-01-26 NOTE — CONSULTS
Pulmonary Consult Note      Reason for Consult: pulmonary nodules with small bilateral pleural effusions  Requesting Physician: Jason Nolasco      Subjective:     CHIEF COMPLAINT / HPI:      The patient is a 79 y.o. male with h/o atrial flutter, PAF, HFpEF, PPM, HTN, AAA, history of DVT and type 2 diabetes who presented to the ED on 1/25/2025 with nausea, vomiting and diarrhea. We are consulted for incidental finding of pulmonary nodules.      Talking with Mr. Mckenna, he is aware of the finding on a previous CT Chest.  He states that he has yearly CT Chest to follow his AAA, most recently 11/17/2024 with [employer]. He tells me his family doctor did tell him of the pulmonary nodule, however no follow up with a pulmonologist has been made.  Mr. 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  He is also a former smoker; starting at age 19 and quitting 10 years ago, at his most smoking 1- 1 1/2 packs per day.  He states his breathing is good but says he has noticed he has gotten a little more short of breath.  He states he is less active than he used to be and assumed the shortness of breath is from being less active.  However, he loves to dance, \"jitterbug\" and finds the more he does dance the better he feels.  He tells me his breathing feels good now, no new cough and does not wheeze.        Past Medical History:      Diagnosis Date    Diabetes mellitus (HCC)     Hypertension       Past Surgical History:        Procedure Laterality Date    PACEMAKER PLACEMENT       Current Medications:     sodium chloride flush  5-40 mL IntraVENous 2 times per day    insulin lispro  0-4 Units SubCUTAneous 4x Daily AC & HS    atorvastatin  20 mg Oral QPM    [Held by provider] lisinopril  10 mg Oral Daily    montelukast  10 mg Oral Daily    NIFEdipine  30 mg Oral Daily    rivaroxaban  20 mg Oral Dinner    sotalol  80 mg Oral BID     Allergies:    Allergies

## 2025-01-26 NOTE — FLOWSHEET NOTE
01/26/25 0753   Vital Signs   Temp 98.4 °F (36.9 °C)   Temp Source Oral   Pulse 76   Heart Rate Source Monitor   Respirations 17   /78   MAP (Calculated) 92   BP Location Left upper arm   BP Method Automatic   Patient Position Semi fowlers   Pain Assessment   Pain Assessment None - Denies Pain   Opioid-Induced Sedation   POSS Score 1   Oxygen Therapy   SpO2 94 %   O2 Device None (Room air)     Shift assessment completed, patient awake and alert sitting up in bed. Morning medications given see MAR. Fluids given per request. Patient denies any further concerns at this time. Call light within reach.

## 2025-01-26 NOTE — PLAN OF CARE
Problem: Chronic Conditions and Co-morbidities  Goal: Patient's chronic conditions and co-morbidity symptoms are monitored and maintained or improved  1/26/2025 1233 by Nicole Lowery RN  Outcome: Adequate for Discharge  1/26/2025 0923 by Nicole Lowery RN  Outcome: Progressing  Flowsheets (Taken 1/26/2025 0923)  Care Plan - Patient's Chronic Conditions and Co-Morbidity Symptoms are Monitored and Maintained or Improved:   Monitor and assess patient's chronic conditions and comorbid symptoms for stability, deterioration, or improvement   Collaborate with multidisciplinary team to address chronic and comorbid conditions and prevent exacerbation or deterioration   Update acute care plan with appropriate goals if chronic or comorbid symptoms are exacerbated and prevent overall improvement and discharge     Problem: Discharge Planning  Goal: Discharge to home or other facility with appropriate resources  1/26/2025 1233 by Nicole Lowery RN  Outcome: Adequate for Discharge  1/26/2025 0923 by Nicole Lowery RN  Outcome: Progressing  Flowsheets (Taken 1/26/2025 0923)  Discharge to home or other facility with appropriate resources:   Identify barriers to discharge with patient and caregiver   Identify discharge learning needs (meds, wound care, etc)   Arrange for needed discharge resources and transportation as appropriate     Problem: ABCDS Injury Assessment  Goal: Absence of physical injury  1/26/2025 1233 by Nicole Lowery RN  Outcome: Adequate for Discharge  1/26/2025 0923 by Nicole Lowery RN  Outcome: Progressing  Flowsheets (Taken 1/26/2025 0923)  Absence of Physical Injury: Implement safety measures based on patient assessment  1/26/2025 0517 by Mary Ann Mckoy RN  Outcome: Progressing  Flowsheets (Taken 1/26/2025 0517)  Absence of Physical Injury: Implement safety measures based on patient assessment     Problem: Safety - Adult  Goal: Free from fall injury  1/26/2025 1233 by Nicole Lowery

## 2025-01-26 NOTE — PROGRESS NOTES
Patient admitted to room 3201 from emergency department. Patient oriented to room, call light, bed rails, phone, lights and bathroom. Patient instructed about the schedule of the day including: vital sign frequency, lab draws, possible tests, frequency of MD and staff rounds, daily weights, I &O's and prescribed diet. Bed locked, in lowest position, side rails up 2/4, call light within reach.               4 Eyes Skin Assessment     NAME:  Osmar Mckenna  YOB: 1945  MEDICAL RECORD NUMBER:  [id number]    The patient is being assessed for  Admission    I agree that at least one RN has performed a thorough Head to Toe Skin Assessment on the patient. ALL assessment sites listed below have been assessed.      Areas assessed by both nurses:    Head, Face, Ears, Shoulders, Back, Chest, Arms, Elbows, Hands, Sacrum. Buttock, Coccyx, Ischium, Legs. Feet and Heels, and Under Medical Devices         Does the Patient have a Wound? No noted wound(s)       Nima Prevention initiated by RN: No  Wound Care Orders initiated by RN: No    Pressure Injury (Stage 3,4, Unstageable, DTI, NWPT, and Complex wounds) if present, place Wound referral order by RN under : No    New Ostomies, if present place, Ostomy referral order under : No     Nurse 1 eSignature: Electronically signed by Mary Ann Mckoy RN on 1/26/25 at 1:14 AM EST    **SHARE this note so that the co-signing nurse can place an eSignature**    Nurse 2 eSignature: Electronically signed by Sloane Ndiaye RN on 1/26/25 at 1:27 AM EST

## 2025-01-26 NOTE — PLAN OF CARE
Problem: ABCDS Injury Assessment  Goal: Absence of physical injury  Outcome: Progressing  Flowsheets (Taken 1/26/2025 0517)  Absence of Physical Injury: Implement safety measures based on patient assessment     Problem: Safety - Adult  Goal: Free from fall injury  Outcome: Progressing  Flowsheets (Taken 1/26/2025 0517)  Free From Fall Injury: Instruct family/caregiver on patient safety     Problem: Pain  Goal: Verbalizes/displays adequate comfort level or baseline comfort level  Outcome: Progressing  Flowsheets (Taken 1/26/2025 0517)  Verbalizes/displays adequate comfort level or baseline comfort level:   Encourage patient to monitor pain and request assistance   Assess pain using appropriate pain scale   Implement non-pharmacological measures as appropriate and evaluate response

## 2025-01-26 NOTE — DISCHARGE SUMMARY
V2.0  Discharge Summary    Name:  Osmar Mckenna /Age/Sex: 1945 (79 y.o. male)   Admit Date: 2025  Discharge Date: 25    MRN & CSN:  [id number] & [id number] Encounter Date and Time 25 11:11 AM EST    Attending:  Jason Nolasco MD Discharging Provider: Jason Nolasco MD       Hospital Course:     Brief HPI: 79 y.o. male who presented to [facility] with concerns for acute gastroenteritis.  PMHx significant for atrial flutter, hypertension, PAF, chronic HFpEF, status post dual-chamber pacemaker, history of DVT, type 2 diabetes.       Patient reports that he started getting intractable nausea and vomiting as well as yellow diarrhea since day prior to admission.  His wife is also sick with this as well.  He was unable to tolerate p.o. intake so he was admitted to the hospital.  He improved with IV fluids, supportive care and is being discharged in stable condition.  Unable to get stool sample because his diarrhea resolved before able to collect.    During mission, noted to have pulmonary pleural lung nodular pleural effusion concerning for possible metastatic disease on imaging.  Pulmonology consulted and will set patient up for outpatient evaluation    Brief Problem Based Course:   #Acute gastroenteritis:  #High anion gap metabolic acidosis secondary to starvation ketosis    #Pulmonary pleural lung nodules with pleural effusions concerning for possible metastatic disease:  -Pulmonology consulted.  Outpatient follow-up scheduled     #History of atrial flutter, A-fib  #Chronic HFpEF, not in acute exacerbation  # Hypertension  #Post dual-chamber pacemaker  -Resume home sotalol twice daily  -Resume home nifedipine, Lipitor, Xarelto     #Type 2 diabetes with hyperglycemia:  - SSI     #History of DVT:  -On Xarelto     #Obesity, class I         The patient expressed appropriate understanding of, and agreement with the discharge recommendations, medications, and plan.     Consults this

## 2025-01-26 NOTE — PLAN OF CARE
Problem: Chronic Conditions and Co-morbidities  Goal: Patient's chronic conditions and co-morbidity symptoms are monitored and maintained or improved  Outcome: Progressing  Flowsheets (Taken 1/26/2025 0923)  Care Plan - Patient's Chronic Conditions and Co-Morbidity Symptoms are Monitored and Maintained or Improved:   Monitor and assess patient's chronic conditions and comorbid symptoms for stability, deterioration, or improvement   Collaborate with multidisciplinary team to address chronic and comorbid conditions and prevent exacerbation or deterioration   Update acute care plan with appropriate goals if chronic or comorbid symptoms are exacerbated and prevent overall improvement and discharge     Problem: Discharge Planning  Goal: Discharge to home or other facility with appropriate resources  Outcome: Progressing  Flowsheets (Taken 1/26/2025 0923)  Discharge to home or other facility with appropriate resources:   Identify barriers to discharge with patient and caregiver   Identify discharge learning needs (meds, wound care, etc)   Arrange for needed discharge resources and transportation as appropriate     Problem: ABCDS Injury Assessment  Goal: Absence of physical injury  1/26/2025 0923 by Nicole Lowery, RN  Outcome: Progressing  Flowsheets (Taken 1/26/2025 0923)  Absence of Physical Injury: Implement safety measures based on patient assessment  1/26/2025 0517 by Mary Ann Mckoy, RN  Outcome: Progressing  Flowsheets (Taken 1/26/2025 0517)  Absence of Physical Injury: Implement safety measures based on patient assessment     Problem: Safety - Adult  Goal: Free from fall injury  1/26/2025 0923 by Nicole Lowery, RN  Outcome: Progressing  Flowsheets (Taken 1/26/2025 0923)  Free From Fall Injury: Instruct family/caregiver on patient safety  1/26/2025 0517 by Mary Ann Mckoy, RN  Outcome: Progressing  Flowsheets (Taken 1/26/2025 0517)  Free From Fall Injury: Instruct family/caregiver on patient safety

## 2025-01-29 ENCOUNTER — TELEPHONE (OUTPATIENT)
Dept: PULMONOLOGY | Age: 80
End: 2025-01-29

## 2025-01-29 NOTE — TELEPHONE ENCOUNTER
He was ref by [facility], Dr Jason Anguiano for abn CT scan.  Scan is showing multi pulm nodules and possibly metastatic.  Please look at CT and see how urgent this appt it.  Pt can be reached at [phone].

## 2025-01-31 ENCOUNTER — OFFICE VISIT (OUTPATIENT)
Age: 80
End: 2025-01-31

## 2025-01-31 VITALS
SYSTOLIC BLOOD PRESSURE: 165 MMHG | HEART RATE: 76 BPM | OXYGEN SATURATION: 96 % | WEIGHT: 187.39 LBS | DIASTOLIC BLOOD PRESSURE: 112 MMHG | BODY MASS INDEX: 33.2 KG/M2 | HEIGHT: 63 IN

## 2025-01-31 DIAGNOSIS — Z87.891 FORMER TOBACCO USE: ICD-10-CM

## 2025-01-31 DIAGNOSIS — R06.09 DOE (DYSPNEA ON EXERTION): ICD-10-CM

## 2025-01-31 DIAGNOSIS — R91.8 LUNG NODULES: Primary | ICD-10-CM

## 2025-01-31 RX ORDER — ALBUTEROL SULFATE 90 UG/1
2 INHALANT RESPIRATORY (INHALATION) 4 TIMES DAILY PRN
Qty: 18 G | Refills: 5 | Status: SHIPPED | OUTPATIENT
Start: 2025-01-31

## 2025-01-31 NOTE — ASSESSMENT & PLAN NOTE
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  Report from CT scan from November 2024 at [facility] showed that there was enlargement of the right middle lobe nodule but it is unclear at the moment, previous CT from January 2024 reported no evidence of nodules.  -PET scan ordered, once PET is done we will decide what would be the best target for biopsy if any.  -I explained the need for PET scan to himself and his girlfriend/wife over the phone.

## 2025-01-31 NOTE — ASSESSMENT & PLAN NOTE
Former smoker, quit about 15 years ago, used to be 1 pack/day user for about 40+ years (40+ pack year).  Encouraged to remain tobacco free.

## 2025-01-31 NOTE — PROGRESS NOTES
airspace disease evident. No noncalcified pulmonary parenchymal masses evident.   Mediastinum and bala: 1.5 cm short axis lymph node in the AP window is unchanged in size compared with previous exam. There is a 5 mm diameter noncalcified pulmonary nodule in the right pretracheal space and 10 mm short axis lymph node in the subcarinal space. No hiatal hernia demonstrated \"  CT Chest 11/17/2024 @ [facility].   \"1. Thoracic aortic measurements, as detailed above. The ascending thoracic aorta measures up to 4.9 cm, not significantly changed.   2. Evaluation of the lungs is significantly limited by respiratory motion. There is apparent slight increase in size of a right middle lobe nodule measuring 1.3 x 1.0 cm today. However, this measurement is felt most likely not accurate. Suggest three-month follow-up CT of the chest to evaluate stability of this nodule.\"  CT abdomen 01/25/2025  Right middle lobe 1x0.9cm nodule, right lower lobe subpleural nodule 1.6x0.9cm, bilateral small pleural effusion    - Pulmonary testing:  No PFT/PSG/CPET/6MWT done recently    - Immunizations:     There is no immunization history on file for this patient.    ASSESSMENT & PLAN:   Osmar is a 79 y.o. male that initially presented to clinic for evaluation of :    1. Lung nodules    2. Former tobacco use    3. 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  Report from CT scan from November 2024 at [facility] showed that there was enlargement of the right middle lobe nodule but it is unclear at the moment, previous CT from January 2024 reported no evidence

## 2025-01-31 NOTE — ASSESSMENT & PLAN NOTE
Reported minimal dyspnea on exertion, no significant limiting respiratory symptoms.  -PFT ordered.  -As needed albuterol ordered as well.

## 2025-01-31 NOTE — TELEPHONE ENCOUNTER
Called and spoke to emergency contact Sigrid, She stated she will try and get a hold of the patient to see if he can come in

## 2025-02-10 ENCOUNTER — HOSPITAL ENCOUNTER (OUTPATIENT)
Dept: PULMONOLOGY | Age: 80
Discharge: HOME | End: 2025-02-10
Attending: INTERNAL MEDICINE
Payer: MEDICARE

## 2025-02-10 DIAGNOSIS — R06.09 DOE (DYSPNEA ON EXERTION): ICD-10-CM

## 2025-02-10 DIAGNOSIS — Z87.891 FORMER TOBACCO USE: ICD-10-CM

## 2025-02-10 PROCEDURE — 94729 DIFFUSING CAPACITY: CPT

## 2025-02-10 PROCEDURE — 94664 DEMO&/EVAL PT USE INHALER: CPT

## 2025-02-10 PROCEDURE — 94760 N-INVAS EAR/PLS OXIMETRY 1: CPT

## 2025-02-10 PROCEDURE — 94726 PLETHYSMOGRAPHY LUNG VOLUMES: CPT

## 2025-02-10 PROCEDURE — 6360000002 HC RX W HCPCS: Performed by: INTERNAL MEDICINE

## 2025-02-10 PROCEDURE — 94060 EVALUATION OF WHEEZING: CPT

## 2025-02-10 RX ORDER — ALBUTEROL SULFATE 0.83 MG/ML
2.5 SOLUTION RESPIRATORY (INHALATION) ONCE
Status: COMPLETED | OUTPATIENT
Start: 2025-02-10 | End: 2025-02-10

## 2025-02-10 RX ADMIN — ALBUTEROL SULFATE 2.5 MG: 2.5 SOLUTION RESPIRATORY (INHALATION) at 10:22

## 2025-02-11 ENCOUNTER — TELEPHONE (OUTPATIENT)
Dept: PULMONOLOGY | Age: 80
End: 2025-02-11

## 2025-02-12 DIAGNOSIS — R91.8 LUNG NODULES: Primary | ICD-10-CM

## 2025-02-12 NOTE — PROGRESS NOTES
Borderline FDG uptake on RML nodule (2.4), called patient and wife with results, plan to repeat CT in 3 months and decide whether he needs biopsy or not afterwards.

## 2025-05-13 ENCOUNTER — HOSPITAL ENCOUNTER (OUTPATIENT)
Age: 80
Discharge: HOME | End: 2025-05-13
Attending: INTERNAL MEDICINE
Payer: MEDICARE

## 2025-05-13 DIAGNOSIS — R91.8 LUNG NODULES: ICD-10-CM

## 2025-05-13 LAB
EGFR, POC: 87 ML/MIN/1.73M2
POC CREATININE: 0.9 MG/DL (ref 0.8–1.3)

## 2025-05-13 PROCEDURE — 82565 ASSAY OF CREATININE: CPT

## 2025-05-13 PROCEDURE — 71260 CT THORAX DX C+: CPT

## 2025-05-13 PROCEDURE — 6360000004 HC RX CONTRAST MEDICATION: Performed by: INTERNAL MEDICINE

## 2025-05-13 RX ORDER — IOPAMIDOL 755 MG/ML
75 INJECTION, SOLUTION INTRAVASCULAR
Status: COMPLETED | OUTPATIENT
Start: 2025-05-13 | End: 2025-05-13

## 2025-05-13 RX ADMIN — IOPAMIDOL 75 ML: 755 INJECTION, SOLUTION INTRAVENOUS at 11:06

## 2025-06-21 ENCOUNTER — APPOINTMENT (OUTPATIENT)
Age: 80
DRG: 287 | End: 2025-06-21
Payer: MEDICARE

## 2025-06-21 ENCOUNTER — HOSPITAL ENCOUNTER (INPATIENT)
Age: 80
LOS: 2 days | Discharge: HOME | DRG: 287 | End: 2025-06-24
Attending: STUDENT IN AN ORGANIZED HEALTH CARE EDUCATION/TRAINING PROGRAM | Admitting: HOSPITALIST
Payer: MEDICARE

## 2025-06-21 DIAGNOSIS — R07.9 CHEST PAIN, UNSPECIFIED TYPE: Primary | ICD-10-CM

## 2025-06-21 DIAGNOSIS — I25.119 CORONARY ARTERY DISEASE INVOLVING NATIVE HEART WITH ANGINA PECTORIS, UNSPECIFIED VESSEL OR LESION TYPE: ICD-10-CM

## 2025-06-21 DIAGNOSIS — I20.9 ANGINA PECTORIS: ICD-10-CM

## 2025-06-21 LAB
ALBUMIN SERPL-MCNC: 4.7 G/DL (ref 3.4–5)
ALBUMIN/GLOB SERPL: 1.6 {RATIO}
ALP SERPL-CCNC: 90 U/L (ref 40–129)
ALT SERPL-CCNC: 13 U/L (ref 10–40)
ANION GAP SERPL CALCULATED.3IONS-SCNC: 17 MMOL/L (ref 3–16)
AST SERPL-CCNC: 18 U/L (ref 15–37)
BASOPHILS # BLD: 0.02 K/UL (ref 0–0.2)
BASOPHILS NFR BLD: 0 %
BILIRUB SERPL-MCNC: 1.4 MG/DL (ref 0–1)
BILIRUB UR QL STRIP: NEGATIVE
BNP SERPL-MCNC: 237 PG/ML (ref 0–449)
BUN SERPL-MCNC: 16 MG/DL (ref 7–20)
CALCIUM SERPL-MCNC: 10 MG/DL (ref 8.3–10.6)
CHLORIDE SERPL-SCNC: 101 MMOL/L (ref 99–110)
CLARITY UR: CLEAR
CO2 SERPL-SCNC: 23 MMOL/L (ref 21–32)
COLOR UR: YELLOW
COMMENT: ABNORMAL
CREAT SERPL-MCNC: 1.1 MG/DL (ref 0.8–1.3)
D DIMER PPP FEU-MCNC: 0.35 UG/ML FEU (ref 0–0.6)
EOSINOPHIL # BLD: 0.21 K/UL (ref 0–0.6)
EOSINOPHILS RELATIVE PERCENT: 3 %
ERYTHROCYTE [DISTWIDTH] IN BLOOD BY AUTOMATED COUNT: 12.7 % (ref 12.4–15.4)
GFR, ESTIMATED: 65 ML/MIN/1.73M2
GLUCOSE BLD-MCNC: 183 MG/DL (ref 70–99)
GLUCOSE SERPL-MCNC: 198 MG/DL (ref 70–99)
GLUCOSE UR STRIP-MCNC: >=1000 MG/DL
HCT VFR BLD AUTO: 44.4 % (ref 40.5–52.5)
HGB BLD-MCNC: 15.2 G/DL (ref 13.5–17.5)
HGB UR QL STRIP.AUTO: NEGATIVE
IMM GRANULOCYTES # BLD AUTO: 0.01 K/UL (ref 0–0.5)
IMM GRANULOCYTES NFR BLD: 0 %
KETONES UR STRIP-MCNC: NEGATIVE MG/DL
LEUKOCYTE ESTERASE UR QL STRIP: NEGATIVE
LYMPHOCYTES NFR BLD: 1.59 K/UL (ref 1–5.1)
LYMPHOCYTES RELATIVE PERCENT: 22 %
MCH RBC QN AUTO: 29.9 PG (ref 26–34)
MCHC RBC AUTO-ENTMCNC: 34.2 G/DL (ref 31–36)
MCV RBC AUTO: 87.4 FL (ref 80–100)
MONOCYTES NFR BLD: 0.76 K/UL (ref 0–1.3)
MONOCYTES NFR BLD: 10 %
NEUTROPHILS NFR BLD: 65 %
NEUTS SEG NFR BLD: 4.81 K/UL (ref 1.7–7.7)
NITRITE UR QL STRIP: NEGATIVE
PH UR STRIP: 5.5 [PH] (ref 5–8)
PLATELET # BLD AUTO: 326 K/UL (ref 135–450)
PMV BLD AUTO: 8.7 FL
POTASSIUM SERPL-SCNC: 3.8 MMOL/L (ref 3.5–5.1)
PROT SERPL-MCNC: 7.7 G/DL (ref 6.4–8.2)
PROT UR STRIP-MCNC: NEGATIVE MG/DL
RBC # BLD AUTO: 5.08 M/UL (ref 4.2–5.9)
SODIUM SERPL-SCNC: 141 MMOL/L (ref 136–145)
SP GR UR STRIP: 1.01 (ref 1–1.03)
TROPONIN I SERPL HS-MCNC: 13 NG/L (ref 0–22)
TROPONIN I SERPL HS-MCNC: 13 NG/L (ref 0–22)
TSH SERPL DL<=0.05 MIU/L-ACNC: 2.05 UIU/ML (ref 0.27–4.2)
UROBILINOGEN UR STRIP-ACNC: 1 EU/DL (ref 0–1)
WBC OTHER # BLD: 7.4 K/UL (ref 4–11)

## 2025-06-21 PROCEDURE — 84443 ASSAY THYROID STIM HORMONE: CPT

## 2025-06-21 PROCEDURE — 83880 ASSAY OF NATRIURETIC PEPTIDE: CPT

## 2025-06-21 PROCEDURE — 81003 URINALYSIS AUTO W/O SCOPE: CPT

## 2025-06-21 PROCEDURE — 71046 X-RAY EXAM CHEST 2 VIEWS: CPT

## 2025-06-21 PROCEDURE — G0378 HOSPITAL OBSERVATION PER HR: HCPCS

## 2025-06-21 PROCEDURE — 82962 GLUCOSE BLOOD TEST: CPT

## 2025-06-21 PROCEDURE — 80053 COMPREHEN METABOLIC PANEL: CPT

## 2025-06-21 PROCEDURE — 2500000003 HC RX 250 WO HCPCS: Performed by: STUDENT IN AN ORGANIZED HEALTH CARE EDUCATION/TRAINING PROGRAM

## 2025-06-21 PROCEDURE — 85025 COMPLETE CBC W/AUTO DIFF WBC: CPT

## 2025-06-21 PROCEDURE — 85379 FIBRIN DEGRADATION QUANT: CPT

## 2025-06-21 PROCEDURE — 99285 EMERGENCY DEPT VISIT HI MDM: CPT

## 2025-06-21 PROCEDURE — 84484 ASSAY OF TROPONIN QUANT: CPT

## 2025-06-21 PROCEDURE — 6370000000 HC RX 637 (ALT 250 FOR IP): Performed by: STUDENT IN AN ORGANIZED HEALTH CARE EDUCATION/TRAINING PROGRAM

## 2025-06-21 RX ORDER — GLUCAGON 1 MG/ML
1 KIT INJECTION PRN
Status: DISCONTINUED | OUTPATIENT
Start: 2025-06-21 | End: 2025-06-24 | Stop reason: HOSPADM

## 2025-06-21 RX ORDER — ASPIRIN 81 MG/1
81 TABLET, CHEWABLE ORAL DAILY
Status: DISCONTINUED | OUTPATIENT
Start: 2025-06-22 | End: 2025-06-23

## 2025-06-21 RX ORDER — POTASSIUM CHLORIDE 7.45 MG/ML
10 INJECTION INTRAVENOUS PRN
Status: DISCONTINUED | OUTPATIENT
Start: 2025-06-21 | End: 2025-06-24 | Stop reason: HOSPADM

## 2025-06-21 RX ORDER — MAGNESIUM SULFATE IN WATER 40 MG/ML
2000 INJECTION, SOLUTION INTRAVENOUS PRN
Status: DISCONTINUED | OUTPATIENT
Start: 2025-06-21 | End: 2025-06-24 | Stop reason: HOSPADM

## 2025-06-21 RX ORDER — SOTALOL HYDROCHLORIDE 80 MG/1
80 TABLET ORAL 2 TIMES DAILY
Status: DISCONTINUED | OUTPATIENT
Start: 2025-06-21 | End: 2025-06-24 | Stop reason: HOSPADM

## 2025-06-21 RX ORDER — SODIUM CHLORIDE 0.9 % (FLUSH) 0.9 %
5-40 SYRINGE (ML) INJECTION PRN
Status: DISCONTINUED | OUTPATIENT
Start: 2025-06-21 | End: 2025-06-24 | Stop reason: HOSPADM

## 2025-06-21 RX ORDER — POLYETHYLENE GLYCOL 3350 17 G/17G
17 POWDER, FOR SOLUTION ORAL DAILY PRN
Status: DISCONTINUED | OUTPATIENT
Start: 2025-06-21 | End: 2025-06-24 | Stop reason: HOSPADM

## 2025-06-21 RX ORDER — ACETAMINOPHEN 650 MG/1
650 SUPPOSITORY RECTAL EVERY 6 HOURS PRN
Status: DISCONTINUED | OUTPATIENT
Start: 2025-06-21 | End: 2025-06-24 | Stop reason: HOSPADM

## 2025-06-21 RX ORDER — ALBUTEROL SULFATE 0.83 MG/ML
2.5 SOLUTION RESPIRATORY (INHALATION)
Status: DISCONTINUED | OUTPATIENT
Start: 2025-06-21 | End: 2025-06-22

## 2025-06-21 RX ORDER — POTASSIUM CHLORIDE 1500 MG/1
40 TABLET, EXTENDED RELEASE ORAL PRN
Status: DISCONTINUED | OUTPATIENT
Start: 2025-06-21 | End: 2025-06-24 | Stop reason: HOSPADM

## 2025-06-21 RX ORDER — INSULIN LISPRO 100 [IU]/ML
0-4 INJECTION, SOLUTION INTRAVENOUS; SUBCUTANEOUS
Status: DISCONTINUED | OUTPATIENT
Start: 2025-06-21 | End: 2025-06-24 | Stop reason: HOSPADM

## 2025-06-21 RX ORDER — ATORVASTATIN CALCIUM 10 MG/1
20 TABLET, FILM COATED ORAL EVERY EVENING
Status: DISCONTINUED | OUTPATIENT
Start: 2025-06-22 | End: 2025-06-24 | Stop reason: HOSPADM

## 2025-06-21 RX ORDER — MONTELUKAST SODIUM 10 MG/1
10 TABLET ORAL NIGHTLY
Status: DISCONTINUED | OUTPATIENT
Start: 2025-06-21 | End: 2025-06-24 | Stop reason: HOSPADM

## 2025-06-21 RX ORDER — NIFEDIPINE 30 MG
30 TABLET, EXTENDED RELEASE ORAL DAILY
Status: DISCONTINUED | OUTPATIENT
Start: 2025-06-22 | End: 2025-06-24 | Stop reason: HOSPADM

## 2025-06-21 RX ORDER — ACETAMINOPHEN 325 MG/1
650 TABLET ORAL EVERY 6 HOURS PRN
Status: DISCONTINUED | OUTPATIENT
Start: 2025-06-21 | End: 2025-06-24 | Stop reason: HOSPADM

## 2025-06-21 RX ORDER — DEXTROSE MONOHYDRATE 100 MG/ML
INJECTION, SOLUTION INTRAVENOUS CONTINUOUS PRN
Status: DISCONTINUED | OUTPATIENT
Start: 2025-06-21 | End: 2025-06-24 | Stop reason: HOSPADM

## 2025-06-21 RX ORDER — SODIUM CHLORIDE 9 MG/ML
INJECTION, SOLUTION INTRAVENOUS PRN
Status: DISCONTINUED | OUTPATIENT
Start: 2025-06-21 | End: 2025-06-24 | Stop reason: HOSPADM

## 2025-06-21 RX ORDER — SODIUM CHLORIDE 0.9 % (FLUSH) 0.9 %
5-40 SYRINGE (ML) INJECTION EVERY 12 HOURS SCHEDULED
Status: DISCONTINUED | OUTPATIENT
Start: 2025-06-21 | End: 2025-06-24 | Stop reason: HOSPADM

## 2025-06-21 RX ORDER — LISINOPRIL 10 MG/1
10 TABLET ORAL DAILY
Status: DISCONTINUED | OUTPATIENT
Start: 2025-06-22 | End: 2025-06-24 | Stop reason: HOSPADM

## 2025-06-21 RX ADMIN — MONTELUKAST 10 MG: 10 TABLET, FILM COATED ORAL at 23:13

## 2025-06-21 RX ADMIN — APIXABAN 5 MG: 5 TABLET, FILM COATED ORAL at 23:13

## 2025-06-21 RX ADMIN — Medication 10 ML: at 23:19

## 2025-06-21 RX ADMIN — INSULIN LISPRO 1 UNITS: 100 INJECTION, SOLUTION INTRAVENOUS; SUBCUTANEOUS at 23:13

## 2025-06-21 RX ADMIN — SOTALOL HYDROCHLORIDE 80 MG: 80 TABLET ORAL at 23:13

## 2025-06-21 ASSESSMENT — PAIN DESCRIPTION - LOCATION
LOCATION: ABDOMEN;CHEST;SHOULDER
LOCATION: CHEST

## 2025-06-21 ASSESSMENT — PAIN DESCRIPTION - ORIENTATION
ORIENTATION: LEFT
ORIENTATION: LEFT

## 2025-06-21 ASSESSMENT — PAIN - FUNCTIONAL ASSESSMENT
PAIN_FUNCTIONAL_ASSESSMENT: 0-10
PAIN_FUNCTIONAL_ASSESSMENT: ACTIVITIES ARE NOT PREVENTED

## 2025-06-21 ASSESSMENT — PAIN DESCRIPTION - PAIN TYPE
TYPE: ACUTE PAIN
TYPE: ACUTE PAIN

## 2025-06-21 ASSESSMENT — LIFESTYLE VARIABLES
HOW MANY STANDARD DRINKS CONTAINING ALCOHOL DO YOU HAVE ON A TYPICAL DAY: PATIENT DOES NOT DRINK
HOW OFTEN DO YOU HAVE A DRINK CONTAINING ALCOHOL: NEVER

## 2025-06-21 ASSESSMENT — PAIN DESCRIPTION - DESCRIPTORS
DESCRIPTORS: DULL
DESCRIPTORS: ACHING

## 2025-06-21 ASSESSMENT — PAIN DESCRIPTION - ONSET: ONSET: ON-GOING

## 2025-06-21 ASSESSMENT — HEART SCORE: ECG: NON-SPECIFC REPOLARIZATION DISTURBANCE/LBTB/PM

## 2025-06-21 ASSESSMENT — PAIN SCALES - GENERAL
PAINLEVEL_OUTOF10: 4
PAINLEVEL_OUTOF10: 4

## 2025-06-21 ASSESSMENT — PAIN DESCRIPTION - FREQUENCY: FREQUENCY: CONTINUOUS

## 2025-06-21 NOTE — ED PROVIDER NOTES
[facility] EMERGENCY DEPARTMENT     EMERGENCY DEPARTMENT ENCOUNTER         Pt Name: Osmar Mckenna   MRN: [id number]   Birthdate 1945   Date of evaluation: 2025   Provider: aJson Vázquez MD   PCP: Masoud Garcia MD   Note Started: 6:12 PM EDT 25       Chief Complaint     Chest Pain (Patient states he has midsternal chest pain that \"travels everywhere\". Patient states the pain radiates to his left shoulder. )      History of Present Illness     Osmar Mckenna is a 80 y.o. male who presents with intermittent chest pains.  The patient is a poor historian.  He offers a variety of subacute complaints he states that since his wife  in 2018 he has suffered some generalized fatigue and malaise.  He states that he has had cardiac testing in the past and was advised that he had some small burden of cardiac disease but not requiring intervention.  He has history of pacemaker.  He has diabetes not on insulin.  He states that more recently he is developed some possible exertional chest pains localized to the left anterior chest though the pain will radiate everywhere including into the right lower quadrant of the abdomen or other parts of the chest or abdomen.  He states that the pain resolved with rest.  It is somewhat difficult to encourage the patient to describe further details including how long he has been experiencing this.  Regardless today while he is currently asymptomatic concern for his condition he presents for evaluation.      Review of Systems     Positives and pertinent negatives as per HPI.    Past Medical, Surgical, Family, and Social History     He has a past medical history of Diabetes mellitus (HCC) and Hypertension.  He has a past surgical history that includes pacemaker placement.  His family history is not on file.  He reports that he has quit smoking. His smoking use included cigarettes. He started smoking about 61 years ago. He has a 13.5 pack-year

## 2025-06-22 PROBLEM — I20.9 ANGINA PECTORIS: Status: ACTIVE | Noted: 2025-06-21

## 2025-06-22 PROBLEM — R07.9 CHEST PAIN: Status: ACTIVE | Noted: 2025-06-22

## 2025-06-22 PROBLEM — I48.0 PAROXYSMAL ATRIAL FIBRILLATION (HCC): Status: ACTIVE | Noted: 2025-06-22

## 2025-06-22 PROBLEM — Z95.0 PACEMAKER: Status: ACTIVE | Noted: 2025-06-22

## 2025-06-22 PROBLEM — I25.119 CORONARY ARTERY DISEASE INVOLVING NATIVE HEART WITH ANGINA PECTORIS: Status: ACTIVE | Noted: 2025-06-22

## 2025-06-22 LAB
ANION GAP SERPL CALCULATED.3IONS-SCNC: 13 MMOL/L (ref 3–16)
BUN SERPL-MCNC: 17 MG/DL (ref 7–20)
CALCIUM SERPL-MCNC: 9.4 MG/DL (ref 8.3–10.6)
CHLORIDE SERPL-SCNC: 105 MMOL/L (ref 99–110)
CO2 SERPL-SCNC: 24 MMOL/L (ref 21–32)
CREAT SERPL-MCNC: 1.1 MG/DL (ref 0.8–1.3)
ERYTHROCYTE [DISTWIDTH] IN BLOOD BY AUTOMATED COUNT: 12.8 % (ref 12.4–15.4)
EST. AVERAGE GLUCOSE BLD GHB EST-MCNC: 166 MG/DL
GFR, ESTIMATED: 71 ML/MIN/1.73M2
GLUCOSE BLD-MCNC: 141 MG/DL (ref 70–99)
GLUCOSE BLD-MCNC: 163 MG/DL (ref 70–99)
GLUCOSE BLD-MCNC: 185 MG/DL (ref 70–99)
GLUCOSE BLD-MCNC: 212 MG/DL (ref 70–99)
GLUCOSE SERPL-MCNC: 150 MG/DL (ref 70–99)
HBA1C MFR BLD: 7.4 %
HCT VFR BLD AUTO: 39.3 % (ref 40.5–52.5)
HGB BLD-MCNC: 13.5 G/DL (ref 13.5–17.5)
MCH RBC QN AUTO: 30.2 PG (ref 26–34)
MCHC RBC AUTO-ENTMCNC: 34.4 G/DL (ref 31–36)
MCV RBC AUTO: 87.9 FL (ref 80–100)
PLATELET # BLD AUTO: 283 K/UL (ref 135–450)
PMV BLD AUTO: 8.8 FL
POTASSIUM SERPL-SCNC: 3.8 MMOL/L (ref 3.5–5.1)
RBC # BLD AUTO: 4.47 M/UL (ref 4.2–5.9)
SODIUM SERPL-SCNC: 142 MMOL/L (ref 136–145)
WBC OTHER # BLD: 8.3 K/UL (ref 4–11)

## 2025-06-22 PROCEDURE — 6370000000 HC RX 637 (ALT 250 FOR IP): Performed by: STUDENT IN AN ORGANIZED HEALTH CARE EDUCATION/TRAINING PROGRAM

## 2025-06-22 PROCEDURE — 80048 BASIC METABOLIC PNL TOTAL CA: CPT

## 2025-06-22 PROCEDURE — 99223 1ST HOSP IP/OBS HIGH 75: CPT | Performed by: INTERNAL MEDICINE

## 2025-06-22 PROCEDURE — 1200000000 HC SEMI PRIVATE

## 2025-06-22 PROCEDURE — 83036 HEMOGLOBIN GLYCOSYLATED A1C: CPT

## 2025-06-22 PROCEDURE — 82962 GLUCOSE BLOOD TEST: CPT

## 2025-06-22 PROCEDURE — 36415 COLL VENOUS BLD VENIPUNCTURE: CPT

## 2025-06-22 PROCEDURE — G0378 HOSPITAL OBSERVATION PER HR: HCPCS

## 2025-06-22 PROCEDURE — 2500000003 HC RX 250 WO HCPCS: Performed by: STUDENT IN AN ORGANIZED HEALTH CARE EDUCATION/TRAINING PROGRAM

## 2025-06-22 PROCEDURE — 85027 COMPLETE CBC AUTOMATED: CPT

## 2025-06-22 RX ORDER — LABETALOL HYDROCHLORIDE 5 MG/ML
10 INJECTION, SOLUTION INTRAVENOUS EVERY 6 HOURS PRN
Status: DISCONTINUED | OUTPATIENT
Start: 2025-06-22 | End: 2025-06-24 | Stop reason: HOSPADM

## 2025-06-22 RX ORDER — ALBUTEROL SULFATE 0.83 MG/ML
2.5 SOLUTION RESPIRATORY (INHALATION) EVERY 4 HOURS PRN
Status: DISCONTINUED | OUTPATIENT
Start: 2025-06-22 | End: 2025-06-24 | Stop reason: HOSPADM

## 2025-06-22 RX ADMIN — INSULIN LISPRO 1 UNITS: 100 INJECTION, SOLUTION INTRAVENOUS; SUBCUTANEOUS at 21:38

## 2025-06-22 RX ADMIN — Medication 10 ML: at 21:39

## 2025-06-22 RX ADMIN — Medication 10 ML: at 08:43

## 2025-06-22 RX ADMIN — ACETAMINOPHEN 650 MG: 325 TABLET ORAL at 19:11

## 2025-06-22 RX ADMIN — MONTELUKAST 10 MG: 10 TABLET, FILM COATED ORAL at 21:38

## 2025-06-22 RX ADMIN — SOTALOL HYDROCHLORIDE 80 MG: 80 TABLET ORAL at 21:38

## 2025-06-22 RX ADMIN — NIFEDIPINE 30 MG: 30 TABLET, EXTENDED RELEASE ORAL at 08:39

## 2025-06-22 RX ADMIN — APIXABAN 5 MG: 5 TABLET, FILM COATED ORAL at 08:38

## 2025-06-22 RX ADMIN — LISINOPRIL 10 MG: 10 TABLET ORAL at 08:39

## 2025-06-22 RX ADMIN — ATORVASTATIN CALCIUM 20 MG: 20 TABLET, FILM COATED ORAL at 17:13

## 2025-06-22 RX ADMIN — SOTALOL HYDROCHLORIDE 80 MG: 80 TABLET ORAL at 08:39

## 2025-06-22 RX ADMIN — ASPIRIN 81 MG: 81 TABLET, CHEWABLE ORAL at 08:38

## 2025-06-22 RX ADMIN — INSULIN LISPRO 1 UNITS: 100 INJECTION, SOLUTION INTRAVENOUS; SUBCUTANEOUS at 12:46

## 2025-06-22 ASSESSMENT — PAIN DESCRIPTION - ORIENTATION
ORIENTATION: RIGHT;LEFT
ORIENTATION: RIGHT;LEFT
ORIENTATION: LEFT
ORIENTATION: RIGHT

## 2025-06-22 ASSESSMENT — PAIN - FUNCTIONAL ASSESSMENT
PAIN_FUNCTIONAL_ASSESSMENT: ACTIVITIES ARE NOT PREVENTED

## 2025-06-22 ASSESSMENT — PAIN DESCRIPTION - PAIN TYPE
TYPE: ACUTE PAIN;CHRONIC PAIN
TYPE: ACUTE PAIN;CHRONIC PAIN
TYPE: ACUTE PAIN
TYPE: ACUTE PAIN

## 2025-06-22 ASSESSMENT — PAIN DESCRIPTION - FREQUENCY
FREQUENCY: CONTINUOUS
FREQUENCY: INTERMITTENT
FREQUENCY: CONTINUOUS
FREQUENCY: CONTINUOUS

## 2025-06-22 ASSESSMENT — PAIN DESCRIPTION - ONSET
ONSET: ON-GOING
ONSET: GRADUAL

## 2025-06-22 ASSESSMENT — PAIN DESCRIPTION - DESCRIPTORS
DESCRIPTORS: ACHING
DESCRIPTORS: OTHER (COMMENT)
DESCRIPTORS: SORE
DESCRIPTORS: DULL

## 2025-06-22 ASSESSMENT — PAIN DESCRIPTION - LOCATION
LOCATION: CHEST
LOCATION: CHEST
LOCATION: HEAD
LOCATION: CHEST
LOCATION: HEAD

## 2025-06-22 ASSESSMENT — PAIN SCALES - GENERAL
PAINLEVEL_OUTOF10: 3
PAINLEVEL_OUTOF10: 2
PAINLEVEL_OUTOF10: 2
PAINLEVEL_OUTOF10: 0
PAINLEVEL_OUTOF10: 6
PAINLEVEL_OUTOF10: 6

## 2025-06-22 ASSESSMENT — PAIN SCALES - WONG BAKER: WONGBAKER_NUMERICALRESPONSE: NO HURT

## 2025-06-22 NOTE — ED NOTES
ED to Inpatient Handoff SBAR    Patient Name: Osmar Mckenna   :  1945  80 y.o.   MRN:  [id number]  Preferred Name    ED Room #:    Family/Caregiver Present yes     Chief Complaint Chest Pain (Patient states he has midsternal chest pain that \"travels everywhere\". Patient states the pain radiates to his left shoulder. )       Restraints no   Sitter no   Sepsis Risk Score    Isolation No active isolations   Fall Risk Assessment Presents to emergency department  because of falls (Syncope, seizure, or loss of consciousness): No, Age > 70: No, Altered Mental Status, Intoxication with alcohol or substance confusion (Disorientation, impaired judgment, poor safety awaremess, or inability to follow instructions): No, Impaired Mobility: Ambulates or transfers with assistive devices or assistance; Unable to ambulate or transer.: No, Nursing Judgement: No     Situation  Code Status: Prior No additional code details.    Allergies: Penicillins  Weight: Patient Vitals for the past 96 hrs (Last 3 readings):   Weight   25 1752 86.2 kg (190 lb)     Arrived from: home  Hospital Problem/Diagnosis:  Principal Problem:    Chest pain  Resolved Problems:    * No resolved hospital problems. *    Imaging:   XR CHEST (2 VW)   Final Result      No acute findings are seen.      Electronically signed by Lokesh Branham        Abnormal labs:   Abnormal Labs Reviewed   COMPREHENSIVE METABOLIC PANEL W/ REFLEX TO MG FOR LOW K - Abnormal; Notable for the following components:       Result Value    Anion Gap 17 (*)     Glucose 198 (*)     Total Bilirubin 1.4 (*)     All other components within normal limits   URINALYSIS WITH REFLEX TO CULTURE - Abnormal; Notable for the following components:    Glucose, Ur >=1000 (*)     All other components within normal limits       Background  History:   Past Medical History:   Diagnosis Date    Diabetes mellitus (HCC)     Hypertension        Assessment    Vitals/MEWS: MEWS Score: 1  Level of

## 2025-06-22 NOTE — RT PROTOCOL NOTE
RT Nebulizer Bronchodilator Protocol Note    There is a bronchodilator order in the chart from a provider indicating to follow the RT Bronchodilator Protocol and there is an “Initiate RT Bronchodilator Protocol” order as well (see protocol at bottom of note).    CXR Findings:  No results found.    The findings from the last RT Protocol Assessment were as follows:  Smoking: Smoker 15 pack years or more  Respiratory Pattern: Regular pattern and RR 12-20 bpm  Breath Sounds: Clear breath sounds  Cough: Strong, spontaneous, non-productive  Indication for Bronchodilator Therapy: None  Bronchodilator Assessment Score: 1    Aerosolized bronchodilator medication orders have been revised according to the RT Nebulizer Bronchodilator Protocol below.    Respiratory Therapist to perform RT Therapy Protocol Assessment initially then follow the protocol.  Repeat RT Therapy Protocol Assessment PRN for score 0-3 or on second treatment, BID, and PRN for scores above 3.    No Indications - adjust the frequency to every 6 hours PRN wheezing or bronchospasm, if no treatments needed after 48 hours then discontinue using Per Protocol order mode.     If indication present, adjust the RT bronchodilator orders based on the Bronchodilator Assessment Score as indicated below.  If a patient is on this medication at home then do not decrease Frequency below that used at home.    0-3 - enter or revise RT bronchodilator order(s) to equivalent RT Bronchodilator order with Frequency of every 4 hours PRN for wheezing or increased work of breathing using Per Protocol order mode.       4-6 - enter or revise RT Bronchodilator order(s) to two equivalent RT bronchodilator orders with one order with BID Frequency and one order with Frequency of every 4 hours PRN wheezing or increased work of breathing using Per Protocol order mode.         7-10 - enter or revise RT Bronchodilator order(s) to two equivalent RT bronchodilator orders with one order with TID

## 2025-06-22 NOTE — PROGRESS NOTES
Hospitalist Progress Note      Name:  Osmar Mckenna /Age/Sex: 1945  (80 y.o. male)   MRN & CSN:  [id number] & [id number] Encounter Date/Time: 2025 3:41 PM EDT    Location:  3216/3216-01 PCP: Masoud Garcia MD       Hospital Day: 2         Date of Admission: 2025    Chief Complaint:    Chief Complaint   Patient presents with    Chest Pain     Patient states he has midsternal chest pain that \"travels everywhere\". Patient states the pain radiates to his left shoulder.         Hospital Course: Osmar Mckenna is a 80 y.o. male with past medical history of hypertension, chronic HFpEF, paroxysmal atrial fibrillation and T2DM.  Patient is a limited historian.  He describes exertional versus positional chest pain radiating to the back into the left shoulder associated with numbness/stiffness of the left upper extremity.  He also endorses shortness of breath and easy fatigability.  No nausea, lightheadedness, vision changes, abdominal or leg swelling.    Patient was admitted to the hospital for further evaluation and treatment.    Subjective: Patient seen and examined at bedside.  He is sitting up edge of bed eating lunch.  Wife is present at bedside.  Patient denies any chest pain but does endorse intermittent shortness of breath.  Patient has a history of pacemaker placement right sided.  Patient states he has had heart caths in the past but no stents.  Patient denies any nausea, vomiting or diaphoresis.  Patient and family denies any new concerns at this time.  Plan for cardiology evaluation and workup Monday.    Assessment and Recommendations:    # Chest pain-likely multifactorial with anginal along with positional components.  Troponin trend has been negative.  No acute EKG changes.  Less likely ACS.  Patient was admitted for further evaluation by cardiology due to his significant cardiac history  - Observation  - Consult cardiology  - Resume diet as stress test is not available

## 2025-06-22 NOTE — CONSULTS
[facility]  Advanced CHF/Pulmonary Hypertension   Cardiac Evaluation      Osmar Mckenna  YOB: 1945    Requesting PHysician:  Dr. Reilly      Chief Complaint   Patient presents with    Chest Pain     Patient states he has midsternal chest pain that \"travels everywhere\". Patient states the pain radiates to his left shoulder.         History of Present Illness:  Osmar Mckenna is a 81 yo male who presents to the ED with intermittent chest pains.  Over the past several months, he has had generalized fatigue and malaise, worsening with time.  He has known CAD with cath in 2023 showing moderate narrowing of a large LAD that supplies a good portion of the right heart as well as the left.  He has a pacemaker.  He has diabetes.  Over the last two weeks, he has had exertional chest pains localized to the left anterior chest.  The pain improves with rest.  He says that the pain has been there constantly for the past two weeks, but worsens with activity and improves with rest.  He cannot stay outside doing physical work for longer than 30 minutes without stopping.  The pain is described as a pressure sensation.  The only thing that makes it better is rest.  He does not take nitroglycerin.  He follows with [facility].  He called their office but could not get in to be seen for a month, so he came to the ER.    According to [facility] records, he has history of paroxysmal atrial fibrillation.  The sensations that he has been having are not at all similar to the symptoms he had with his afib.  He has dilated ascending aorta.  He has been maintained on Xarelto but he has not been able to continue getting this from the company so he has been switched to Eliquis.  The patient describes a possible ablation procedure in the past.    I am asked to see him for the chest pain.  Work up in the ED shows normal troponin x 2.  BNP is minimally elevated.  D dimer is negative.  Kidney

## 2025-06-22 NOTE — WOUND CARE
4 Eyes Skin Assessment     NAME:  Osmar Mckenna  YOB: 1945  MEDICAL RECORD NUMBER:  [id number]    The patient is being assessed for  Admission    I agree that at least one RN has performed a thorough Head to Toe Skin Assessment on the patient. ALL assessment sites listed below have been assessed.      Areas assessed by both nurses:    Head, Face, Ears, Shoulders, Back, Chest, Arms, Elbows, Hands, Sacrum. Buttock, Coccyx, Ischium, Legs. Feet and Heels, Under Medical Devices , and Other n/a        Does the Patient have a Wound? No noted wound(s)       Nima Prevention initiated by RN: No  Wound Care Orders initiated by RN: No    Pressure Injury (Stage 3,4, Unstageable, DTI, NWPT, and Complex wounds) if present, place Wound referral order by RN under : No    New Ostomies, if present place, Ostomy referral order under : No     Nurse 1 eSignature: Electronically signed by Caroline Alba RN on 6/21/25 at 10:58 PM EDT    **SHARE this note so that the co-signing nurse can place an eSignature**    Nurse 2 eSignature: Electronically signed by Sloane Ndiaye RN on 6/22/25 at 1:59 AM EDT

## 2025-06-22 NOTE — H&P
History and Physical      Name:  Osmar Mckenna /Age/Sex: 1945  (80 y.o. male)   MRN & CSN:  [id number] & [id number] Encounter Date/Time: 2025 8:46 PM EDT   Location:   PCP: Masoud Garcia MD         Date of Exam: 2025       Chief Complaint:     Chief Complaint   Patient presents with    Chest Pain     Patient states he has midsternal chest pain that \"travels everywhere\". Patient states the pain radiates to his left shoulder.          Assessment/Plan:     Chest pain, likely multifactorial with anginal + positional components.  Troponin reassuring and no acute EKG changes, ACS not likely.  Cardiology was consulted, patient n.p.o. after midnight for possible stress test.    Chronic HFrEF: Euvolemic, continue PTA aspirin + Lipitor    Paroxysmal atrial fibrillation: Rate controlled, continue sotalol and Eliquis    Hypertension, well-controlled, continue PTA lisinopril + nifedipine, monitor and provide labetalol for exacerbations    First-degree AV block, incomplete RBBB, QTc 518: Continuous cardiac monitoring, avoid QTc prolonging medications    T2DM: A1c 6.8 on 2025, recheck.  SSI with glucose checks ACHS.    Disposition:  Patient was placed in observation    Discussed with Patient    Diet ADULT DIET; Regular; 3 carb choices (45 gm/meal); Low Fat/Low Chol/High Fiber/SEJAL; No Caffeine  Diet NPO   DVT Prophylaxis [] Lovenox, []  Heparin, [] SCDs, [] Ambulation,  [x] Eliquis, [] Xarelto, [] Coumadin   Code Status Full Code   Surrogate Decision Maker/ POA Wife         History of Present Illness:     Osmar Mckenna is a 80 y.o. male with past medical history of hypertension, chronic HFpEF, paroxysmal atrial fibrillation and T2DM.  Patient is a limited historian.  He describes exertional versus positional chest pain radiating to the back into the left shoulder associated with numbness/stiffness of the left upper extremity.  He also endorses shortness of breath and easy

## 2025-06-23 ENCOUNTER — APPOINTMENT (OUTPATIENT)
Age: 80
DRG: 287 | End: 2025-06-23
Payer: MEDICARE

## 2025-06-23 LAB
ECHO AO ROOT DIAM: 4.1 CM
ECHO AO ROOT INDEX: 2.15 CM/M2
ECHO AV AREA PEAK VELOCITY: 3.9 CM2
ECHO AV AREA VTI: 3.2 CM2
ECHO AV AREA/BSA PEAK VELOCITY: 2 CM2/M2
ECHO AV AREA/BSA VTI: 1.7 CM2/M2
ECHO AV CUSP MM: 1.5 CM
ECHO AV MEAN GRADIENT: 2 MMHG
ECHO AV MEAN VELOCITY: 0.7 M/S
ECHO AV PEAK GRADIENT: 5 MMHG
ECHO AV PEAK VELOCITY: 1.1 M/S
ECHO AV VELOCITY RATIO: 1.27
ECHO AV VTI: 22.3 CM
ECHO BSA: 1.97 M2
ECHO EST RA PRESSURE: 3 MMHG
ECHO LV E' LATERAL VELOCITY: 5 CM/S
ECHO LV E' SEPTAL VELOCITY: 4.46 CM/S
ECHO LV EF PHYSICIAN: 55 %
ECHO LVOT AREA: 3.1 CM2
ECHO LVOT AV VTI INDEX: 1.03
ECHO LVOT DIAM: 2 CM
ECHO LVOT MEAN GRADIENT: 2 MMHG
ECHO LVOT PEAK GRADIENT: 8 MMHG
ECHO LVOT PEAK VELOCITY: 1.4 M/S
ECHO LVOT STROKE VOLUME INDEX: 37.8 ML/M2
ECHO LVOT SV: 72.2 ML
ECHO LVOT VTI: 23 CM
ECHO MV A VELOCITY: 1.14 M/S
ECHO MV AREA VTI: 3.1 CM2
ECHO MV E DECELERATION TIME (DT): 201 MS
ECHO MV E VELOCITY: 0.73 M/S
ECHO MV E/A RATIO: 0.64
ECHO MV E/E' LATERAL: 14.6
ECHO MV E/E' RATIO (AVERAGED): 15.48
ECHO MV E/E' SEPTAL: 16.37
ECHO MV LVOT VTI INDEX: 1.01
ECHO MV MAX VELOCITY: 1.3 M/S
ECHO MV MEAN GRADIENT: 3 MMHG
ECHO MV MEAN VELOCITY: 0.8 M/S
ECHO MV PEAK GRADIENT: 7 MMHG
ECHO MV VTI: 23.3 CM
ECHO PVEIN A DURATION: 106 MS
ECHO PVEIN A VELOCITY: 0.2 M/S
ECHO PVEIN PEAK D VELOCITY: 0.2 M/S
ECHO PVEIN PEAK S VELOCITY: 0.3 M/S
ECHO PVEIN S/D RATIO: 1.5 NO UNITS
ECHO RV FREE WALL PEAK S': 12.7 CM/S
GLUCOSE BLD-MCNC: 109 MG/DL (ref 70–99)
GLUCOSE BLD-MCNC: 152 MG/DL (ref 70–99)
GLUCOSE BLD-MCNC: 181 MG/DL (ref 70–99)
GLUCOSE BLD-MCNC: 194 MG/DL (ref 70–99)
GLUCOSE BLD-MCNC: 204 MG/DL (ref 70–99)
NUC STRESS EJECTION FRACTION: 62 %
NUC STRESS LV EDV: 91 ML (ref 67–155)
NUC STRESS LV ESV: 35 ML (ref 22–58)
NUC STRESS LV MASS: 123 G
NUC STRESS LV STROKE VOLUME: 56 ML
STRESS BASELINE DIAS BP: 76 MMHG
STRESS BASELINE HR: 71 BPM
STRESS BASELINE SYS BP: 155 MMHG
STRESS ESTIMATED WORKLOAD: 1 METS
STRESS PEAK DIAS BP: 85 MMHG
STRESS PEAK SYS BP: 161 MMHG
STRESS PERCENT HR ACHIEVED: 70 %
STRESS POST PEAK HR: 98 BPM
STRESS RATE PRESSURE PRODUCT: NORMAL BPM*MMHG
STRESS TARGET HR: 141 BPM

## 2025-06-23 PROCEDURE — 2000000000 HC ICU R&B

## 2025-06-23 PROCEDURE — 3430000000 HC RX DIAGNOSTIC RADIOPHARMACEUTICAL: Performed by: INTERNAL MEDICINE

## 2025-06-23 PROCEDURE — 6370000000 HC RX 637 (ALT 250 FOR IP): Performed by: STUDENT IN AN ORGANIZED HEALTH CARE EDUCATION/TRAINING PROGRAM

## 2025-06-23 PROCEDURE — 99152 MOD SED SAME PHYS/QHP 5/>YRS: CPT | Performed by: INTERNAL MEDICINE

## 2025-06-23 PROCEDURE — 78452 HT MUSCLE IMAGE SPECT MULT: CPT | Performed by: INTERNAL MEDICINE

## 2025-06-23 PROCEDURE — 99153 MOD SED SAME PHYS/QHP EA: CPT | Performed by: INTERNAL MEDICINE

## 2025-06-23 PROCEDURE — C1894 INTRO/SHEATH, NON-LASER: HCPCS | Performed by: INTERNAL MEDICINE

## 2025-06-23 PROCEDURE — 82962 GLUCOSE BLOOD TEST: CPT

## 2025-06-23 PROCEDURE — C1760 CLOSURE DEV, VASC: HCPCS | Performed by: INTERNAL MEDICINE

## 2025-06-23 PROCEDURE — 2709999900 HC NON-CHARGEABLE SUPPLY: Performed by: INTERNAL MEDICINE

## 2025-06-23 PROCEDURE — 6360000002 HC RX W HCPCS: Performed by: INTERNAL MEDICINE

## 2025-06-23 PROCEDURE — B2151ZZ FLUOROSCOPY OF LEFT HEART USING LOW OSMOLAR CONTRAST: ICD-10-PCS | Performed by: INTERNAL MEDICINE

## 2025-06-23 PROCEDURE — B2111ZZ FLUOROSCOPY OF MULTIPLE CORONARY ARTERIES USING LOW OSMOLAR CONTRAST: ICD-10-PCS | Performed by: INTERNAL MEDICINE

## 2025-06-23 PROCEDURE — A9502 TC99M TETROFOSMIN: HCPCS | Performed by: INTERNAL MEDICINE

## 2025-06-23 PROCEDURE — 78452 HT MUSCLE IMAGE SPECT MULT: CPT

## 2025-06-23 PROCEDURE — 93454 CORONARY ARTERY ANGIO S&I: CPT | Performed by: INTERNAL MEDICINE

## 2025-06-23 PROCEDURE — C1769 GUIDE WIRE: HCPCS | Performed by: INTERNAL MEDICINE

## 2025-06-23 PROCEDURE — 4A023N7 MEASUREMENT OF CARDIAC SAMPLING AND PRESSURE, LEFT HEART, PERCUTANEOUS APPROACH: ICD-10-PCS | Performed by: INTERNAL MEDICINE

## 2025-06-23 PROCEDURE — 93005 ELECTROCARDIOGRAM TRACING: CPT | Performed by: INTERNAL MEDICINE

## 2025-06-23 PROCEDURE — 2580000003 HC RX 258: Performed by: INTERNAL MEDICINE

## 2025-06-23 PROCEDURE — 2500000003 HC RX 250 WO HCPCS: Performed by: STUDENT IN AN ORGANIZED HEALTH CARE EDUCATION/TRAINING PROGRAM

## 2025-06-23 PROCEDURE — 93018 CV STRESS TEST I&R ONLY: CPT | Performed by: INTERNAL MEDICINE

## 2025-06-23 PROCEDURE — C8929 TTE W OR WO FOL WCON,DOPPLER: HCPCS

## 2025-06-23 PROCEDURE — 93306 TTE W/DOPPLER COMPLETE: CPT | Performed by: INTERNAL MEDICINE

## 2025-06-23 PROCEDURE — 2500000003 HC RX 250 WO HCPCS: Performed by: INTERNAL MEDICINE

## 2025-06-23 PROCEDURE — 93016 CV STRESS TEST SUPVJ ONLY: CPT | Performed by: INTERNAL MEDICINE

## 2025-06-23 PROCEDURE — 93017 CV STRESS TEST TRACING ONLY: CPT

## 2025-06-23 PROCEDURE — 6360000004 HC RX CONTRAST MEDICATION: Performed by: INTERNAL MEDICINE

## 2025-06-23 PROCEDURE — 6360000004 HC RX CONTRAST MEDICATION: Performed by: HOSPITALIST

## 2025-06-23 RX ORDER — SODIUM CHLORIDE 0.9 % (FLUSH) 0.9 %
5-40 SYRINGE (ML) INJECTION PRN
Status: DISCONTINUED | OUTPATIENT
Start: 2025-06-23 | End: 2025-06-24 | Stop reason: HOSPADM

## 2025-06-23 RX ORDER — MIDAZOLAM HYDROCHLORIDE 1 MG/ML
INJECTION, SOLUTION INTRAMUSCULAR; INTRAVENOUS PRN
Status: DISCONTINUED | OUTPATIENT
Start: 2025-06-23 | End: 2025-06-23 | Stop reason: HOSPADM

## 2025-06-23 RX ORDER — SODIUM CHLORIDE 9 MG/ML
INJECTION, SOLUTION INTRAVENOUS CONTINUOUS
Status: DISCONTINUED | OUTPATIENT
Start: 2025-06-23 | End: 2025-06-23

## 2025-06-23 RX ORDER — FENTANYL CITRATE 50 UG/ML
INJECTION, SOLUTION INTRAMUSCULAR; INTRAVENOUS PRN
Status: DISCONTINUED | OUTPATIENT
Start: 2025-06-23 | End: 2025-06-23 | Stop reason: HOSPADM

## 2025-06-23 RX ORDER — SODIUM CHLORIDE 9 MG/ML
INJECTION, SOLUTION INTRAVENOUS PRN
Status: DISCONTINUED | OUTPATIENT
Start: 2025-06-23 | End: 2025-06-24 | Stop reason: HOSPADM

## 2025-06-23 RX ORDER — SODIUM CHLORIDE 0.9 % (FLUSH) 0.9 %
5-40 SYRINGE (ML) INJECTION EVERY 12 HOURS SCHEDULED
Status: DISCONTINUED | OUTPATIENT
Start: 2025-06-23 | End: 2025-06-24 | Stop reason: HOSPADM

## 2025-06-23 RX ORDER — OXYCODONE AND ACETAMINOPHEN 5; 325 MG/1; MG/1
2 TABLET ORAL EVERY 4 HOURS PRN
Status: DISCONTINUED | OUTPATIENT
Start: 2025-06-23 | End: 2025-06-24 | Stop reason: HOSPADM

## 2025-06-23 RX ORDER — REGADENOSON 0.08 MG/ML
0.4 INJECTION, SOLUTION INTRAVENOUS
Status: COMPLETED | OUTPATIENT
Start: 2025-06-23 | End: 2025-06-23

## 2025-06-23 RX ORDER — MORPHINE SULFATE 2 MG/ML
2 INJECTION, SOLUTION INTRAMUSCULAR; INTRAVENOUS
Status: DISCONTINUED | OUTPATIENT
Start: 2025-06-23 | End: 2025-06-24 | Stop reason: HOSPADM

## 2025-06-23 RX ORDER — OXYCODONE AND ACETAMINOPHEN 5; 325 MG/1; MG/1
1 TABLET ORAL EVERY 4 HOURS PRN
Status: DISCONTINUED | OUTPATIENT
Start: 2025-06-23 | End: 2025-06-24 | Stop reason: HOSPADM

## 2025-06-23 RX ORDER — ACETAMINOPHEN 325 MG/1
650 TABLET ORAL EVERY 4 HOURS PRN
Status: DISCONTINUED | OUTPATIENT
Start: 2025-06-23 | End: 2025-06-24 | Stop reason: HOSPADM

## 2025-06-23 RX ORDER — IOPAMIDOL 755 MG/ML
INJECTION, SOLUTION INTRAVASCULAR PRN
Status: DISCONTINUED | OUTPATIENT
Start: 2025-06-23 | End: 2025-06-23 | Stop reason: HOSPADM

## 2025-06-23 RX ADMIN — Medication 10 ML: at 20:24

## 2025-06-23 RX ADMIN — ASPIRIN 81 MG: 81 TABLET, CHEWABLE ORAL at 12:01

## 2025-06-23 RX ADMIN — REGADENOSON 0.4 MG: 0.08 INJECTION, SOLUTION INTRAVENOUS at 10:12

## 2025-06-23 RX ADMIN — INSULIN LISPRO 1 UNITS: 100 INJECTION, SOLUTION INTRAVENOUS; SUBCUTANEOUS at 20:24

## 2025-06-23 RX ADMIN — ATORVASTATIN CALCIUM 20 MG: 20 TABLET, FILM COATED ORAL at 17:16

## 2025-06-23 RX ADMIN — TETROFOSMIN 32 MILLICURIE: 1.38 INJECTION, POWDER, LYOPHILIZED, FOR SOLUTION INTRAVENOUS at 10:12

## 2025-06-23 RX ADMIN — Medication 10 ML: at 12:10

## 2025-06-23 RX ADMIN — TETROFOSMIN 10.9 MILLICURIE: 1.38 INJECTION, POWDER, LYOPHILIZED, FOR SOLUTION INTRAVENOUS at 09:16

## 2025-06-23 RX ADMIN — SOTALOL HYDROCHLORIDE 80 MG: 80 TABLET ORAL at 12:01

## 2025-06-23 RX ADMIN — LISINOPRIL 10 MG: 10 TABLET ORAL at 12:01

## 2025-06-23 RX ADMIN — SODIUM CHLORIDE: 0.9 INJECTION, SOLUTION INTRAVENOUS at 17:19

## 2025-06-23 RX ADMIN — NIFEDIPINE 30 MG: 30 TABLET, EXTENDED RELEASE ORAL at 12:01

## 2025-06-23 RX ADMIN — SULFUR HEXAFLUORIDE 2 ML: KIT at 10:46

## 2025-06-23 RX ADMIN — SOTALOL HYDROCHLORIDE 80 MG: 80 TABLET ORAL at 20:24

## 2025-06-23 RX ADMIN — MONTELUKAST 10 MG: 10 TABLET, FILM COATED ORAL at 20:24

## 2025-06-23 RX ADMIN — SODIUM CHLORIDE: 0.9 INJECTION, SOLUTION INTRAVENOUS at 14:21

## 2025-06-23 ASSESSMENT — PAIN SCALES - GENERAL
PAINLEVEL_OUTOF10: 4
PAINLEVEL_OUTOF10: 0

## 2025-06-23 ASSESSMENT — PAIN - FUNCTIONAL ASSESSMENT: PAIN_FUNCTIONAL_ASSESSMENT: ACTIVITIES ARE NOT PREVENTED

## 2025-06-23 ASSESSMENT — PAIN DESCRIPTION - ORIENTATION: ORIENTATION: LEFT

## 2025-06-23 ASSESSMENT — PAIN DESCRIPTION - DESCRIPTORS: DESCRIPTORS: ACHING

## 2025-06-23 ASSESSMENT — PAIN DESCRIPTION - LOCATION: LOCATION: CHEST

## 2025-06-23 NOTE — PROGRESS NOTES
1415 Bedside handoff given to Louisa ICU RN. VSS. Pt awake and alert with no complaints. Right groin soft and non-tender, no oozing or hematoma noted at site. Sterile dressing CDI.

## 2025-06-23 NOTE — SEDATION DOCUMENTATION
mg Rectal Q6H PRN Haroon Reilly MD        polyethylene glycol (GLYCOLAX) packet 17 g  17 g Oral Daily PRN Haroon Reilly MD        aspirin chewable tablet 81 mg  81 mg Oral Daily Haroon Reilly MD   81 mg at 06/23/25 1201    [Held by provider] apixaban (ELIQUIS) tablet 5 mg  5 mg Oral BID Haroon Reilly MD   5 mg at 06/22/25 0838       Past Medical History:    Past Medical History:   Diagnosis Date    Diabetes mellitus (HCC)     Hypertension        Surgical History:    Past Surgical History:   Procedure Laterality Date    PACEMAKER PLACEMENT               Pre-Sedation:  Pre-Sedation Documentation and Exam:  I have assessed the patient and reviewed the H&P on the chart.    Prior History of Anesthesia Complications:   none    Modified Mallampati:  II (soft palate, uvula, fauces visible)    ASA Classification:  Class 2 - A normal healthy patient with mild systemic disease    Villa Scale:  Activity:  2 - Able to move 4 extremities voluntarily on command  Respiration:  2 - Able to breathe deeply and cough freely  Circulation:  2 - BP+/- 20mmHg of normal  Consciousness:  2 - Fully awake  Oxygen Saturation (color):  2 - Able to maintain oxygen saturation >92% on room air    Sedation/Anesthesia Plan:  Guard the patient's safety and welfare.  Minimize physical discomfort and pain.  Minimize negative psychological responses to treatment by providing sedation and analgesia and maximize the potential amnesia.  Patient to meet pre-procedure discharge plan.    Medication Planned:  midazolam intravenously and fentanyl intravenously    Patient is an appropriate candidate for plan of sedation:   yes      Electronically signed by Sebastian Alanis MD on 6/23/2025 at 12:15 PM

## 2025-06-23 NOTE — CARE COORDINATION
Discharge Planning Note:    Chart reviewed and it appears that patient has minimal needs for discharge at this time. Risk Score 10 %     Primary Care Physician is MARY TORRES   Primary insurance is MEDICARE PART A AND B     Please notify case management if any discharge needs are identified.      Case management will continue to follow progress and update discharge plan as needed.     Pt from home. No d/c needs at this time. Likely NN.

## 2025-06-23 NOTE — PROGRESS NOTES
Hospitalist Progress Note      Name:  Osmar Mckenna /Age/Sex: 1945  (80 y.o. male)   MRN & CSN:  [id number] & [id number] Encounter Date/Time: 2025 3:41 PM EDT    Location:  3216/3216-01 PCP: Masoud Garcia MD       Hospital Day: 3         Date of Admission: 2025    Chief Complaint:    Chief Complaint   Patient presents with    Chest Pain     Patient states he has midsternal chest pain that \"travels everywhere\". Patient states the pain radiates to his left shoulder.         Hospital Course: Osmar Mckenna is a 80 y.o. male with past medical history of hypertension, chronic HFpEF, paroxysmal atrial fibrillation and T2DM.  Patient is a limited historian.  He describes exertional versus positional chest pain radiating to the back into the left shoulder associated with numbness/stiffness of the left upper extremity.  He also endorses shortness of breath and easy fatigability.  No nausea, lightheadedness, vision changes, abdominal or leg swelling.    Patient was admitted to the hospital for further evaluation and treatment.    Subjective: Patient was seen and examined after nuclear med stress test this morning.  He is up in room sitting in chair.  He also had his 2D echo brought down and stress that.  Patient denies any chest pain.  He does report he had an episode of where he felt like his heart was racing this morning.  Did discuss with him telemetry monitor was reviewed and around 539 this morning he did have episode of 4 PVCs and pacer spikes kicked in and then returned to normal rhythm patient denies any chest pain no other episodes just felt like his heart was racing.    Pending stress test results and echo results and cardiology recommendations.    Update 1230 patient was taken to Cath Lab for abnormal nuc med stress test results showing area of ischemia.    Assessment and Recommendations:    # Chest pain-likely multifactorial with anginal along with positional

## 2025-06-23 NOTE — PROGRESS NOTES
[facility] Daily Progress Note      Admit Date:  6/21/2025    Chief Complaint   Patient presents with    Chest Pain     Patient states he has midsternal chest pain that \"travels everywhere\". Patient states the pain radiates to his left shoulder.         Subjective:  Mr. Mckenna denies exertional chest pain, SOB/COLEMAN, PND, palpitations, light-headedness, or edema.     Objective:   BP (!) 147/77   Pulse 73   Temp 98.4 °F (36.9 °C) (Oral)   Resp 18   Ht 1.626 m (5' 4\")   Wt 86.2 kg (190 lb)   SpO2 97%   BMI 32.61 kg/m²     Intake/Output Summary (Last 24 hours) at 6/23/2025 0913  Last data filed at 6/23/2025 0404  Gross per 24 hour   Intake 1010 ml   Output --   Net 1010 ml       TELEMETRY: Sinus     Physical Exam:  General:  Awake, alert, oriented x 3, NAD  Skin:  Warm and dry  Neck:  JVD flat  Chest:  normal air entry  Cardiovascular:  RRR S1S2, no S3, no mrmr  Abdomen:  Soft, ND, NT, No HSM  Extremities:  No edema    Medications:    atorvastatin  20 mg Oral QPM    lisinopril  10 mg Oral Daily    montelukast  10 mg Oral Nightly    NIFEdipine  30 mg Oral Daily    sotalol  80 mg Oral BID    insulin lispro  0-4 Units SubCUTAneous 4x Daily AC & HS    sodium chloride flush  5-40 mL IntraVENous 2 times per day    aspirin  81 mg Oral Daily    [Held by provider] apixaban  5 mg Oral BID      dextrose      sodium chloride       albuterol, labetalol, glucose, dextrose bolus **OR** dextrose bolus, glucagon (rDNA), dextrose, sodium chloride flush, sodium chloride, potassium chloride **OR** potassium alternative oral replacement **OR** potassium chloride, magnesium sulfate, acetaminophen **OR** acetaminophen, polyethylene glycol    Lab Data:  CBC:   Recent Labs     06/21/25  1833 06/22/25  0507   WBC 7.4 8.3   HGB 15.2 13.5   HCT 44.4 39.3*   MCV 87.4 87.9    283     BMP:   Recent Labs     06/21/25 1833 06/22/25  0507    142   K 3.8 3.8    105   CO2 23 24   BUN 16 17   CREATININE 1.1 1.1

## 2025-06-23 NOTE — NURSING NOTE
Patient arrived stress lab for stress testing. Patient information on stress testing provided, all questions and concerns answered. Patient reported 5/10 chest pain, Dr. Alanis made aware. Patient is agreeable to proceeded with stress testing.

## 2025-06-23 NOTE — NURSING NOTE
Patient completed stress portion of testing. Patient reported SOB that resolved with feet movement. No complaints of chest pain. Patient education provided, voices understanding. Patient discharged to nuclear medicine for final scan. Nuclear medicine to transport patient back to unit after final scan.

## 2025-06-23 NOTE — PROGRESS NOTES
4 Eyes Skin Assessment     NAME:  Osmar Mckenna  YOB: 1945  MEDICAL RECORD NUMBER:  [id number]    The patient is being assessed for  Cath Lab Post-Op    I agree that at least one RN has performed a thorough Head to Toe Skin Assessment on the patient. ALL assessment sites listed below have been assessed.      Areas assessed by both nurses:    Head, Face, Ears, Shoulders, Back, Chest, Arms, Elbows, Hands, Sacrum. Buttock, Coccyx, Ischium, Legs. Feet and Heels, and Under Medical Devices         Does the Patient have a Wound? No noted wound(s)       Nima Prevention initiated by RN: No  Wound Care Orders initiated by RN: No    Pressure Injury (Stage 3,4, Unstageable, DTI, NWPT, and Complex wounds) if present, place Wound referral order by RN under : No    New Ostomies, if present place, Ostomy referral order under : No     Nurse 1 eSignature: Electronically signed by KEN GREEN RN on 6/23/25 at 4:54 PM EDT    **SHARE this note so that the co-signing nurse can place an eSignature**    Nurse 2 eSignature: Electronically signed by Maria Elena Rodriguez RN on 6/23/25 at 4:59 PM EDT

## 2025-06-24 VITALS
DIASTOLIC BLOOD PRESSURE: 70 MMHG | WEIGHT: 189.6 LBS | BODY MASS INDEX: 32.37 KG/M2 | TEMPERATURE: 98.9 F | OXYGEN SATURATION: 95 % | RESPIRATION RATE: 20 BRPM | SYSTOLIC BLOOD PRESSURE: 128 MMHG | HEART RATE: 81 BPM | HEIGHT: 64 IN

## 2025-06-24 LAB
ANION GAP SERPL CALCULATED.3IONS-SCNC: 21 MMOL/L (ref 3–16)
BUN SERPL-MCNC: 15 MG/DL (ref 7–20)
CALCIUM SERPL-MCNC: 9.5 MG/DL (ref 8.3–10.6)
CHLORIDE SERPL-SCNC: 100 MMOL/L (ref 99–110)
CO2 SERPL-SCNC: 18 MMOL/L (ref 21–32)
CREAT SERPL-MCNC: 0.9 MG/DL (ref 0.8–1.3)
ERYTHROCYTE [DISTWIDTH] IN BLOOD BY AUTOMATED COUNT: 12.5 % (ref 12.4–15.4)
GFR, ESTIMATED: 87 ML/MIN/1.73M2
GLUCOSE BLD-MCNC: 138 MG/DL (ref 70–99)
GLUCOSE SERPL-MCNC: 154 MG/DL (ref 70–99)
HCT VFR BLD AUTO: 40.6 % (ref 40.5–52.5)
HGB BLD-MCNC: 14.3 G/DL (ref 13.5–17.5)
MCH RBC QN AUTO: 30.6 PG (ref 26–34)
MCHC RBC AUTO-ENTMCNC: 35.2 G/DL (ref 31–36)
MCV RBC AUTO: 86.8 FL (ref 80–100)
PLATELET # BLD AUTO: 304 K/UL (ref 135–450)
PMV BLD AUTO: 8.9 FL
POTASSIUM SERPL-SCNC: 3.8 MMOL/L (ref 3.5–5.1)
RBC # BLD AUTO: 4.68 M/UL (ref 4.2–5.9)
SODIUM SERPL-SCNC: 139 MMOL/L (ref 136–145)
WBC OTHER # BLD: 11.5 K/UL (ref 4–11)

## 2025-06-24 PROCEDURE — 36415 COLL VENOUS BLD VENIPUNCTURE: CPT

## 2025-06-24 PROCEDURE — 80048 BASIC METABOLIC PNL TOTAL CA: CPT

## 2025-06-24 PROCEDURE — 82962 GLUCOSE BLOOD TEST: CPT

## 2025-06-24 PROCEDURE — 6370000000 HC RX 637 (ALT 250 FOR IP): Performed by: STUDENT IN AN ORGANIZED HEALTH CARE EDUCATION/TRAINING PROGRAM

## 2025-06-24 PROCEDURE — 2500000003 HC RX 250 WO HCPCS: Performed by: INTERNAL MEDICINE

## 2025-06-24 PROCEDURE — 2500000003 HC RX 250 WO HCPCS: Performed by: STUDENT IN AN ORGANIZED HEALTH CARE EDUCATION/TRAINING PROGRAM

## 2025-06-24 PROCEDURE — 85027 COMPLETE CBC AUTOMATED: CPT

## 2025-06-24 PROCEDURE — 6370000000 HC RX 637 (ALT 250 FOR IP): Performed by: INTERNAL MEDICINE

## 2025-06-24 RX ADMIN — APIXABAN 5 MG: 5 TABLET, FILM COATED ORAL at 08:38

## 2025-06-24 RX ADMIN — Medication 10 ML: at 08:38

## 2025-06-24 RX ADMIN — NIFEDIPINE 30 MG: 30 TABLET, EXTENDED RELEASE ORAL at 08:38

## 2025-06-24 RX ADMIN — LISINOPRIL 10 MG: 10 TABLET ORAL at 08:38

## 2025-06-24 RX ADMIN — SOTALOL HYDROCHLORIDE 80 MG: 80 TABLET ORAL at 08:38

## 2025-06-24 NOTE — DISCHARGE SUMMARY
[phone].    NOTE:  This report was transcribed using voice recognition software.  Every effort was made to ensure accuracy; however, inadvertent computerized transcription errors may be present.

## 2025-06-24 NOTE — PLAN OF CARE
Problem: Chronic Conditions and Co-morbidities  Goal: Patient's chronic conditions and co-morbidity symptoms are monitored and maintained or improved  6/22/2025 1113 by Maritza Hwang RN  Outcome: Progressing     Problem: Discharge Planning  Goal: Discharge to home or other facility with appropriate resources  6/22/2025 1113 by Maritza Hwang, RN  Outcome: Progressing     Problem: Pain  Goal: Verbalizes/displays adequate comfort level or baseline comfort level  6/22/2025 1113 by Maritza Hwang, RN  Outcome: Progressing     Problem: Safety - Adult  Goal: Free from fall injury  6/22/2025 1113 by Maritza Hwang, RN  Outcome: Progressing     
  Problem: Chronic Conditions and Co-morbidities  Goal: Patient's chronic conditions and co-morbidity symptoms are monitored and maintained or improved  6/23/2025 0632 by Caroline Alba RN  Outcome: Progressing  6/23/2025 0632 by Caroline Alba RN  Outcome: Progressing     Problem: Discharge Planning  Goal: Discharge to home or other facility with appropriate resources  Outcome: Progressing     Problem: Pain  Goal: Verbalizes/displays adequate comfort level or baseline comfort level  Outcome: Progressing     Problem: Safety - Adult  Goal: Free from fall injury  6/23/2025 0632 by Caroline Alba RN  Outcome: Progressing  6/23/2025 0632 by Caroline Alba RN  Outcome: Progressing     
  Problem: Chronic Conditions and Co-morbidities  Goal: Patient's chronic conditions and co-morbidity symptoms are monitored and maintained or improved  6/23/2025 1657 by Louisa Sellers RN  Outcome: Progressing  Flowsheets (Taken 6/23/2025 1426)  Care Plan - Patient's Chronic Conditions and Co-Morbidity Symptoms are Monitored and Maintained or Improved:   Monitor and assess patient's chronic conditions and comorbid symptoms for stability, deterioration, or improvement   Collaborate with multidisciplinary team to address chronic and comorbid conditions and prevent exacerbation or deterioration   Update acute care plan with appropriate goals if chronic or comorbid symptoms are exacerbated and prevent overall improvement and discharge  6/23/2025 0632 by Caroline Alba RN  Outcome: Progressing     Problem: Discharge Planning  Goal: Discharge to home or other facility with appropriate resources  6/23/2025 1657 by Louisa Sellers RN  Outcome: Progressing  Flowsheets (Taken 6/23/2025 1426)  Discharge to home or other facility with appropriate resources:   Identify barriers to discharge with patient and caregiver   Arrange for needed discharge resources and transportation as appropriate   Identify discharge learning needs (meds, wound care, etc)   Arrange for interpreters to assist at discharge as needed   Refer to discharge planning if patient needs post-hospital services based on physician order or complex needs related to functional status, cognitive ability or social support system  6/23/2025 0632 by Caroline Alba, RN  Outcome: Progressing     Problem: Pain  Goal: Verbalizes/displays adequate comfort level or baseline comfort level  6/23/2025 1657 by Louisa Sellers, RN  Outcome: Progressing  6/23/2025 0632 by Caroline Alba RN  Outcome: Progressing     Problem: Safety - Adult  Goal: Free from fall injury  6/23/2025 1657 by Louisa Sellers, RN  Outcome: Progressing  Flowsheets (Taken 6/23/2025 1655)  Free From Fall 
  Problem: Chronic Conditions and Co-morbidities  Goal: Patient's chronic conditions and co-morbidity symptoms are monitored and maintained or improved  6/24/2025 0824 by Alejandra Bucio RN  Outcome: Progressing  Flowsheets (Taken 6/24/2025 0800)  Care Plan - Patient's Chronic Conditions and Co-Morbidity Symptoms are Monitored and Maintained or Improved:   Monitor and assess patient's chronic conditions and comorbid symptoms for stability, deterioration, or improvement   Collaborate with multidisciplinary team to address chronic and comorbid conditions and prevent exacerbation or deterioration   Update acute care plan with appropriate goals if chronic or comorbid symptoms are exacerbated and prevent overall improvement and discharge  6/23/2025 2037 by Herminia Claudio RN  Outcome: Progressing     Problem: Discharge Planning  Goal: Discharge to home or other facility with appropriate resources  6/24/2025 0824 by Alejandra Bucio RN  Outcome: Progressing  Flowsheets (Taken 6/24/2025 0800)  Discharge to home or other facility with appropriate resources:   Identify barriers to discharge with patient and caregiver   Arrange for needed discharge resources and transportation as appropriate   Identify discharge learning needs (meds, wound care, etc)   Arrange for interpreters to assist at discharge as needed   Refer to discharge planning if patient needs post-hospital services based on physician order or complex needs related to functional status, cognitive ability or social support system  6/23/2025 2037 by Herminia Claudio RN  Outcome: Progressing     Problem: Pain  Goal: Verbalizes/displays adequate comfort level or baseline comfort level  6/24/2025 0824 by Alejandra Bucio RN  Outcome: Progressing  Flowsheets (Taken 6/24/2025 0800)  Verbalizes/displays adequate comfort level or baseline comfort level:   Encourage patient to monitor pain and request assistance   Assess pain using appropriate pain scale   Administer 
  Problem: Chronic Conditions and Co-morbidities  Goal: Patient's chronic conditions and co-morbidity symptoms are monitored and maintained or improved  Outcome: Progressing     Problem: Discharge Planning  Goal: Discharge to home or other facility with appropriate resources  Outcome: Progressing     Problem: Pain  Goal: Verbalizes/displays adequate comfort level or baseline comfort level  Outcome: Progressing     Problem: Safety - Adult  Goal: Free from fall injury  Outcome: Progressing     
Progressing  Flowsheets (Taken 6/23/2025 1655)  Free From Fall Injury:   Instruct family/caregiver on patient safety   Based on caregiver fall risk screen, instruct family/caregiver to ask for assistance with transferring infant if caregiver noted to have fall risk factors     Problem: Skin/Tissue Integrity  Goal: Skin integrity remains intact  Description: 1.  Monitor for areas of redness and/or skin breakdown  2.  Assess vascular access sites hourly  3.  Every 4-6 hours minimum:  Change oxygen saturation probe site  4.  Every 4-6 hours:  If on nasal continuous positive airway pressure, respiratory therapy assess nares and determine need for appliance change or resting period  6/23/2025 2037 by Herminia Claudio, RN  Outcome: Progressing  6/23/2025 1657 by Louisa Sellers, RN  Outcome: Progressing  Flowsheets  Taken 6/23/2025 1655  Skin Integrity Remains Intact:   Monitor for areas of redness and/or skin breakdown   Assess vascular access sites hourly   Every 4-6 hours minimum:  Change oxygen saturation probe site   Every 4-6 hours:  If on nasal continuous positive airway pressure, assess nares and determine need for appliance change or resting period   Turn and reposition as indicated   Assess need for specialty bed   Positioning devices   Pressure redistribution bed/mattress (bed type)   Check visual cues for pain   Monitor skin under medical devices  Taken 6/23/2025 1426  Skin Integrity Remains Intact:   Assess vascular access sites hourly   Every 4-6 hours minimum:  Change oxygen saturation probe site   Every 4-6 hours:  If on nasal continuous positive airway pressure, assess nares and determine need for appliance change or resting period   Monitor for areas of redness and/or skin breakdown   Turn and reposition as indicated   Assess need for specialty bed   Positioning devices   Check visual cues for pain

## 2025-06-24 NOTE — PROGRESS NOTES
End of Shift Summary:    Significant Events:  - ambulating well post-cath yesterday, vitals stable overnight, puncture site clean, dry, intact  - plan to start Eliquis this morning  - possible discharge home today if cleared by cardiology    Neuro: alert and oriented X4, does have very mild confusion sometimes when waking up in the middle of the night    Cardiac: NSR on telemetry, occasional pacer spikes    Respiratory: room air    GI: regular diet thin liquids, takes pills whole. ACHS accucheck with sliding scale coverage    : standby assist to bathroom    IV Access/Drips: left antecubital peripheral, saline locked

## 2025-06-24 NOTE — PROGRESS NOTES
[facility] Daily Progress Note      Admit Date:  6/21/2025    Chief Complaint   Patient presents with    Chest Pain     Patient states he has midsternal chest pain that \"travels everywhere\". Patient states the pain radiates to his left shoulder.         Subjective:  Mr. Mckenna denies exertional chest pain, SOB/COLEMAN, PND, palpitations, light-headedness, or edema. R groin CDI     Objective:   /70   Pulse 81   Temp 98.9 °F (37.2 °C) (Oral)   Resp 20   Ht 1.626 m (5' 4\")   Wt 86 kg (189 lb 9.5 oz)   SpO2 95%   BMI 32.54 kg/m²     Intake/Output Summary (Last 24 hours) at 6/24/2025 1018  Last data filed at 6/24/2025 0400  Gross per 24 hour   Intake 1015.96 ml   Output 1205 ml   Net -189.04 ml       TELEMETRY: Sinus     Physical Exam:  General:  Awake, alert, oriented x 3, NAD  Skin:  Warm and dry  Neck:  JVD flat  Chest:  normal air entry  Cardiovascular:  RRR S1S2, no S3, no mrmr  Abdomen:  Soft, ND, NT, No HSM  Extremities:  No edema    Medications:    sodium chloride flush  5-40 mL IntraVENous 2 times per day    apixaban  5 mg Oral BID    atorvastatin  20 mg Oral QPM    lisinopril  10 mg Oral Daily    montelukast  10 mg Oral Nightly    NIFEdipine  30 mg Oral Daily    sotalol  80 mg Oral BID    insulin lispro  0-4 Units SubCUTAneous 4x Daily AC & HS    sodium chloride flush  5-40 mL IntraVENous 2 times per day      sodium chloride      dextrose      sodium chloride       sodium chloride flush, sodium chloride, acetaminophen, oxyCODONE-acetaminophen **OR** oxyCODONE-acetaminophen, morphine, albuterol, labetalol, glucose, dextrose bolus **OR** dextrose bolus, glucagon (rDNA), dextrose, sodium chloride flush, sodium chloride, potassium chloride **OR** potassium alternative oral replacement **OR** potassium chloride, magnesium sulfate, acetaminophen **OR** acetaminophen, polyethylene glycol    Lab Data:  CBC:   Recent Labs     06/21/25  1833 06/22/25  0507 06/24/25  0357   WBC 7.4 8.3 11.5*   HGB

## 2025-06-25 ENCOUNTER — HOSPITAL ENCOUNTER (INPATIENT)
Age: 80
LOS: 7 days | Discharge: HOME HEALTH | DRG: 871 | End: 2025-07-02
Attending: EMERGENCY MEDICINE | Admitting: STUDENT IN AN ORGANIZED HEALTH CARE EDUCATION/TRAINING PROGRAM
Payer: MEDICARE

## 2025-06-25 ENCOUNTER — APPOINTMENT (OUTPATIENT)
Age: 80
DRG: 871 | End: 2025-06-25
Payer: MEDICARE

## 2025-06-25 DIAGNOSIS — R10.9 FLANK PAIN: ICD-10-CM

## 2025-06-25 DIAGNOSIS — R55 NEAR SYNCOPE: ICD-10-CM

## 2025-06-25 DIAGNOSIS — R16.0 LIVER MASS: ICD-10-CM

## 2025-06-25 DIAGNOSIS — R47.81 SLURRED SPEECH: ICD-10-CM

## 2025-06-25 DIAGNOSIS — R42 LIGHTHEADEDNESS: Primary | ICD-10-CM

## 2025-06-25 PROBLEM — K76.9 LIVER LESION: Status: ACTIVE | Noted: 2025-06-25

## 2025-06-25 LAB
ALBUMIN SERPL-MCNC: 4.3 G/DL (ref 3.4–5)
ALBUMIN/GLOB SERPL: 1.2 {RATIO}
ALP SERPL-CCNC: 112 U/L (ref 40–129)
ALT SERPL-CCNC: 29 U/L (ref 10–40)
AMMONIA PLAS-SCNC: <10 UMOL/L (ref 16–60)
ANION GAP SERPL CALCULATED.3IONS-SCNC: 19 MMOL/L (ref 3–16)
AST SERPL-CCNC: 46 U/L (ref 15–37)
B-OH-BUTYR SERPL-MCNC: 0.87 MMOL/L (ref 0–0.27)
BASOPHILS # BLD: 0.03 K/UL (ref 0–0.2)
BASOPHILS NFR BLD: 0 %
BILIRUB SERPL-MCNC: 2.3 MG/DL (ref 0–1)
BILIRUB UR QL STRIP: NEGATIVE
BNP SERPL-MCNC: 783 PG/ML (ref 0–449)
BUN SERPL-MCNC: 19 MG/DL (ref 7–20)
CALCIUM SERPL-MCNC: 10.1 MG/DL (ref 8.3–10.6)
CHARACTER UR: ABNORMAL
CHLORIDE SERPL-SCNC: 97 MMOL/L (ref 99–110)
CLARITY UR: CLEAR
CO2 SERPL-SCNC: 20 MMOL/L (ref 21–32)
COHGB MFR BLD: 2 % (ref 0–1.5)
COLOR UR: YELLOW
CREAT SERPL-MCNC: 1.4 MG/DL (ref 0.8–1.3)
EOSINOPHIL # BLD: 0.03 K/UL (ref 0–0.6)
EOSINOPHILS RELATIVE PERCENT: 0 %
EPI CELLS #/AREA URNS HPF: ABNORMAL /HPF
ERYTHROCYTE [DISTWIDTH] IN BLOOD BY AUTOMATED COUNT: 12.6 % (ref 12.4–15.4)
FLUAV AG SPEC QL: NEGATIVE
FLUBV AG SPEC QL: NEGATIVE
GFR, ESTIMATED: 53 ML/MIN/1.73M2
GLUCOSE SERPL-MCNC: 276 MG/DL (ref 70–99)
GLUCOSE UR STRIP-MCNC: >=1000 MG/DL
HCO3 VENOUS: 21.9 MMOL/L (ref 23–29)
HCT VFR BLD AUTO: 44.9 % (ref 40.5–52.5)
HGB BLD-MCNC: 15.1 G/DL (ref 13.5–17.5)
HGB UR QL STRIP.AUTO: NEGATIVE
IMM GRANULOCYTES # BLD AUTO: 0.05 K/UL (ref 0–0.5)
IMM GRANULOCYTES NFR BLD: 0 %
INR PPP: 1.4 (ref 0.9–1.1)
KETONES UR STRIP-MCNC: 15 MG/DL
LACTATE BLDV-SCNC: 2.2 MMOL/L (ref 0.4–2)
LEUKOCYTE ESTERASE UR QL STRIP: NEGATIVE
LYMPHOCYTES NFR BLD: 0.86 K/UL (ref 1–5.1)
LYMPHOCYTES RELATIVE PERCENT: 6 %
MCH RBC QN AUTO: 29.5 PG (ref 26–34)
MCHC RBC AUTO-ENTMCNC: 33.6 G/DL (ref 31–36)
MCV RBC AUTO: 87.9 FL (ref 80–100)
METHEMOGLOBIN: 0.5 % (ref 0–1.4)
MONOCYTES NFR BLD: 0.8 K/UL (ref 0–1.3)
MONOCYTES NFR BLD: 5 %
NEGATIVE BASE EXCESS, VEN: 3.6 MMOL/L (ref 0–3)
NEUTROPHILS NFR BLD: 88 %
NEUTS SEG NFR BLD: 13.18 K/UL (ref 1.7–7.7)
NITRITE UR QL STRIP: NEGATIVE
O2 SAT, VEN: 69.1 %
PARTIAL THROMBOPLASTIN TIME: 34.1 SEC (ref 22.8–35.8)
PCO2 VENOUS: 41.1 MM HG (ref 40–50)
PH UR STRIP: 6 [PH] (ref 5–8)
PH VENOUS: 7.34 (ref 7.35–7.45)
PLATELET # BLD AUTO: 287 K/UL (ref 135–450)
PMV BLD AUTO: 8.9 FL (ref 9.4–12.4)
PO2 VENOUS: 38.1 MM HG
POTASSIUM SERPL-SCNC: 3.6 MMOL/L (ref 3.5–5.1)
PROT SERPL-MCNC: 8 G/DL (ref 6.4–8.2)
PROT UR STRIP-MCNC: ABNORMAL MG/DL
PROTHROMBIN TIME: 17.7 SEC (ref 12.1–14.9)
RBC # BLD AUTO: 5.11 M/UL (ref 4.2–5.9)
RBC #/AREA URNS HPF: ABNORMAL /HPF
SARS-COV-2 RDRP RESP QL NAA+PROBE: NOT DETECTED
SODIUM SERPL-SCNC: 136 MMOL/L (ref 136–145)
SP GR UR STRIP: 1.01 (ref 1–1.03)
SPECIMEN DESCRIPTION: NORMAL
TROPONIN I SERPL HS-MCNC: 15 NG/L (ref 0–22)
TROPONIN I SERPL HS-MCNC: 19 NG/L (ref 0–22)
UROBILINOGEN UR STRIP-ACNC: 1 EU/DL (ref 0–1)
WBC #/AREA URNS HPF: ABNORMAL /HPF
WBC OTHER # BLD: 15 K/UL (ref 4–11)

## 2025-06-25 PROCEDURE — 87154 CUL TYP ID BLD PTHGN 6+ TRGT: CPT

## 2025-06-25 PROCEDURE — 70450 CT HEAD/BRAIN W/O DYE: CPT

## 2025-06-25 PROCEDURE — 84484 ASSAY OF TROPONIN QUANT: CPT

## 2025-06-25 PROCEDURE — 85025 COMPLETE CBC W/AUTO DIFF WBC: CPT

## 2025-06-25 PROCEDURE — 96360 HYDRATION IV INFUSION INIT: CPT

## 2025-06-25 PROCEDURE — 96361 HYDRATE IV INFUSION ADD-ON: CPT

## 2025-06-25 PROCEDURE — 6360000004 HC RX CONTRAST MEDICATION: Performed by: EMERGENCY MEDICINE

## 2025-06-25 PROCEDURE — 80053 COMPREHEN METABOLIC PANEL: CPT

## 2025-06-25 PROCEDURE — 83880 ASSAY OF NATRIURETIC PEPTIDE: CPT

## 2025-06-25 PROCEDURE — 82140 ASSAY OF AMMONIA: CPT

## 2025-06-25 PROCEDURE — 2060000000 HC ICU INTERMEDIATE R&B

## 2025-06-25 PROCEDURE — 85610 PROTHROMBIN TIME: CPT

## 2025-06-25 PROCEDURE — 99285 EMERGENCY DEPT VISIT HI MDM: CPT

## 2025-06-25 PROCEDURE — 83605 ASSAY OF LACTIC ACID: CPT

## 2025-06-25 PROCEDURE — 36415 COLL VENOUS BLD VENIPUNCTURE: CPT

## 2025-06-25 PROCEDURE — 82805 BLOOD GASES W/O2 SATURATION: CPT

## 2025-06-25 PROCEDURE — 85730 THROMBOPLASTIN TIME PARTIAL: CPT

## 2025-06-25 PROCEDURE — 87502 INFLUENZA DNA AMP PROBE: CPT

## 2025-06-25 PROCEDURE — 81001 URINALYSIS AUTO W/SCOPE: CPT

## 2025-06-25 PROCEDURE — 87635 SARS-COV-2 COVID-19 AMP PRB: CPT

## 2025-06-25 PROCEDURE — 93005 ELECTROCARDIOGRAM TRACING: CPT

## 2025-06-25 PROCEDURE — 2580000003 HC RX 258: Performed by: EMERGENCY MEDICINE

## 2025-06-25 PROCEDURE — 82010 KETONE BODYS QUAN: CPT

## 2025-06-25 PROCEDURE — 87040 BLOOD CULTURE FOR BACTERIA: CPT

## 2025-06-25 PROCEDURE — 74175 CTA ABDOMEN W/CONTRAST: CPT

## 2025-06-25 PROCEDURE — 71045 X-RAY EXAM CHEST 1 VIEW: CPT

## 2025-06-25 PROCEDURE — 83036 HEMOGLOBIN GLYCOSYLATED A1C: CPT

## 2025-06-25 PROCEDURE — 70496 CT ANGIOGRAPHY HEAD: CPT

## 2025-06-25 RX ORDER — OXYCODONE HYDROCHLORIDE 5 MG/1
5 TABLET ORAL EVERY 4 HOURS PRN
Refills: 0 | Status: DISCONTINUED | OUTPATIENT
Start: 2025-06-25 | End: 2025-06-25

## 2025-06-25 RX ORDER — PROCHLORPERAZINE MALEATE 5 MG/1
10 TABLET ORAL EVERY 8 HOURS PRN
Status: DISCONTINUED | OUTPATIENT
Start: 2025-06-25 | End: 2025-07-02 | Stop reason: HOSPADM

## 2025-06-25 RX ORDER — ONDANSETRON 2 MG/ML
4 INJECTION INTRAMUSCULAR; INTRAVENOUS EVERY 6 HOURS PRN
Status: DISCONTINUED | OUTPATIENT
Start: 2025-06-25 | End: 2025-06-25 | Stop reason: ALTCHOICE

## 2025-06-25 RX ORDER — 0.9 % SODIUM CHLORIDE 0.9 %
1000 INTRAVENOUS SOLUTION INTRAVENOUS ONCE
Status: COMPLETED | OUTPATIENT
Start: 2025-06-25 | End: 2025-06-25

## 2025-06-25 RX ORDER — GLUCAGON 1 MG/ML
1 KIT INJECTION PRN
Status: DISCONTINUED | OUTPATIENT
Start: 2025-06-25 | End: 2025-07-02 | Stop reason: HOSPADM

## 2025-06-25 RX ORDER — PROCHLORPERAZINE EDISYLATE 5 MG/ML
10 INJECTION INTRAMUSCULAR; INTRAVENOUS EVERY 6 HOURS PRN
Status: DISCONTINUED | OUTPATIENT
Start: 2025-06-25 | End: 2025-07-02 | Stop reason: HOSPADM

## 2025-06-25 RX ORDER — SODIUM CHLORIDE 0.9 % (FLUSH) 0.9 %
5-40 SYRINGE (ML) INJECTION EVERY 12 HOURS SCHEDULED
Status: DISCONTINUED | OUTPATIENT
Start: 2025-06-25 | End: 2025-07-02 | Stop reason: HOSPADM

## 2025-06-25 RX ORDER — SODIUM CHLORIDE 0.9 % (FLUSH) 0.9 %
5-40 SYRINGE (ML) INJECTION PRN
Status: DISCONTINUED | OUTPATIENT
Start: 2025-06-25 | End: 2025-07-02 | Stop reason: HOSPADM

## 2025-06-25 RX ORDER — MAGNESIUM SULFATE IN WATER 40 MG/ML
2000 INJECTION, SOLUTION INTRAVENOUS PRN
Status: DISCONTINUED | OUTPATIENT
Start: 2025-06-25 | End: 2025-07-02 | Stop reason: HOSPADM

## 2025-06-25 RX ORDER — MONTELUKAST SODIUM 10 MG/1
10 TABLET ORAL
Status: DISCONTINUED | OUTPATIENT
Start: 2025-06-26 | End: 2025-07-02 | Stop reason: HOSPADM

## 2025-06-25 RX ORDER — ACETAMINOPHEN 325 MG/1
650 TABLET ORAL EVERY 6 HOURS PRN
Status: DISCONTINUED | OUTPATIENT
Start: 2025-06-25 | End: 2025-07-01 | Stop reason: SDUPTHER

## 2025-06-25 RX ORDER — ATORVASTATIN CALCIUM 20 MG/1
20 TABLET, FILM COATED ORAL EVERY EVENING
Status: DISCONTINUED | OUTPATIENT
Start: 2025-06-25 | End: 2025-07-02 | Stop reason: HOSPADM

## 2025-06-25 RX ORDER — OXYCODONE HYDROCHLORIDE 5 MG/1
2.5 TABLET ORAL EVERY 4 HOURS PRN
Refills: 0 | Status: DISCONTINUED | OUTPATIENT
Start: 2025-06-25 | End: 2025-07-02 | Stop reason: HOSPADM

## 2025-06-25 RX ORDER — LISINOPRIL 10 MG/1
10 TABLET ORAL DAILY
Status: DISCONTINUED | OUTPATIENT
Start: 2025-06-26 | End: 2025-06-30

## 2025-06-25 RX ORDER — ACETAMINOPHEN 650 MG/1
650 SUPPOSITORY RECTAL EVERY 6 HOURS PRN
Status: DISCONTINUED | OUTPATIENT
Start: 2025-06-25 | End: 2025-07-02 | Stop reason: HOSPADM

## 2025-06-25 RX ORDER — POTASSIUM CHLORIDE 7.45 MG/ML
10 INJECTION INTRAVENOUS PRN
Status: DISCONTINUED | OUTPATIENT
Start: 2025-06-25 | End: 2025-07-02 | Stop reason: HOSPADM

## 2025-06-25 RX ORDER — POTASSIUM CHLORIDE 1500 MG/1
40 TABLET, EXTENDED RELEASE ORAL PRN
Status: DISCONTINUED | OUTPATIENT
Start: 2025-06-25 | End: 2025-07-02 | Stop reason: HOSPADM

## 2025-06-25 RX ORDER — SODIUM CHLORIDE 9 MG/ML
INJECTION, SOLUTION INTRAVENOUS PRN
Status: DISCONTINUED | OUTPATIENT
Start: 2025-06-25 | End: 2025-07-02 | Stop reason: HOSPADM

## 2025-06-25 RX ORDER — POLYETHYLENE GLYCOL 3350 17 G/17G
17 POWDER, FOR SOLUTION ORAL DAILY PRN
Status: DISCONTINUED | OUTPATIENT
Start: 2025-06-25 | End: 2025-07-02 | Stop reason: HOSPADM

## 2025-06-25 RX ORDER — ONDANSETRON 4 MG/1
4 TABLET, ORALLY DISINTEGRATING ORAL EVERY 8 HOURS PRN
Status: DISCONTINUED | OUTPATIENT
Start: 2025-06-25 | End: 2025-06-25 | Stop reason: ALTCHOICE

## 2025-06-25 RX ORDER — NIFEDIPINE 30 MG
30 TABLET, EXTENDED RELEASE ORAL DAILY
Status: DISCONTINUED | OUTPATIENT
Start: 2025-06-26 | End: 2025-06-30

## 2025-06-25 RX ORDER — INSULIN LISPRO 100 [IU]/ML
0-4 INJECTION, SOLUTION INTRAVENOUS; SUBCUTANEOUS
Status: DISCONTINUED | OUTPATIENT
Start: 2025-06-25 | End: 2025-06-28 | Stop reason: SDUPTHER

## 2025-06-25 RX ORDER — METRONIDAZOLE 500 MG/100ML
500 INJECTION, SOLUTION INTRAVENOUS EVERY 8 HOURS
Status: DISCONTINUED | OUTPATIENT
Start: 2025-06-26 | End: 2025-07-02 | Stop reason: HOSPADM

## 2025-06-25 RX ORDER — DEXTROSE MONOHYDRATE 100 MG/ML
INJECTION, SOLUTION INTRAVENOUS CONTINUOUS PRN
Status: DISCONTINUED | OUTPATIENT
Start: 2025-06-25 | End: 2025-07-02 | Stop reason: HOSPADM

## 2025-06-25 RX ORDER — IOPAMIDOL 755 MG/ML
75 INJECTION, SOLUTION INTRAVASCULAR
Status: COMPLETED | OUTPATIENT
Start: 2025-06-25 | End: 2025-06-25

## 2025-06-25 RX ORDER — ENOXAPARIN SODIUM 100 MG/ML
40 INJECTION SUBCUTANEOUS DAILY
Status: DISCONTINUED | OUTPATIENT
Start: 2025-06-26 | End: 2025-06-30

## 2025-06-25 RX ORDER — MORPHINE SULFATE 4 MG/ML
4 INJECTION, SOLUTION INTRAMUSCULAR; INTRAVENOUS
Refills: 0 | Status: DISCONTINUED | OUTPATIENT
Start: 2025-06-25 | End: 2025-06-25

## 2025-06-25 RX ADMIN — IOPAMIDOL 75 ML: 755 INJECTION, SOLUTION INTRAVENOUS at 19:01

## 2025-06-25 RX ADMIN — SODIUM CHLORIDE 1000 ML: 0.9 INJECTION, SOLUTION INTRAVENOUS at 20:30

## 2025-06-25 RX ADMIN — SODIUM CHLORIDE 1000 ML: 0.9 INJECTION, SOLUTION INTRAVENOUS at 18:40

## 2025-06-25 RX ADMIN — IOPAMIDOL 75 ML: 755 INJECTION, SOLUTION INTRAVENOUS at 19:02

## 2025-06-25 ASSESSMENT — PAIN DESCRIPTION - ORIENTATION
ORIENTATION: RIGHT
ORIENTATION: RIGHT;ANTERIOR

## 2025-06-25 ASSESSMENT — PAIN DESCRIPTION - DESCRIPTORS: DESCRIPTORS: ACHING

## 2025-06-25 ASSESSMENT — LIFESTYLE VARIABLES
HOW MANY STANDARD DRINKS CONTAINING ALCOHOL DO YOU HAVE ON A TYPICAL DAY: PATIENT DOES NOT DRINK
HOW OFTEN DO YOU HAVE A DRINK CONTAINING ALCOHOL: NEVER
HOW OFTEN DO YOU HAVE A DRINK CONTAINING ALCOHOL: NEVER

## 2025-06-25 ASSESSMENT — PAIN SCALES - GENERAL
PAINLEVEL_OUTOF10: 2
PAINLEVEL_OUTOF10: 4

## 2025-06-25 ASSESSMENT — PAIN DESCRIPTION - ONSET: ONSET: ON-GOING

## 2025-06-25 ASSESSMENT — PAIN DESCRIPTION - LOCATION
LOCATION: HEAD
LOCATION: GROIN

## 2025-06-25 ASSESSMENT — PAIN SCALES - WONG BAKER: WONGBAKER_NUMERICALRESPONSE: HURTS A LITTLE BIT

## 2025-06-25 ASSESSMENT — PAIN - FUNCTIONAL ASSESSMENT
PAIN_FUNCTIONAL_ASSESSMENT: NONE - DENIES PAIN
PAIN_FUNCTIONAL_ASSESSMENT: NONE - DENIES PAIN
PAIN_FUNCTIONAL_ASSESSMENT: ACTIVITIES ARE NOT PREVENTED

## 2025-06-25 ASSESSMENT — PAIN DESCRIPTION - PAIN TYPE: TYPE: ACUTE PAIN

## 2025-06-25 ASSESSMENT — PAIN DESCRIPTION - FREQUENCY: FREQUENCY: CONTINUOUS

## 2025-06-25 NOTE — ED PROVIDER NOTES
EMERGENCY MEDICINE ATTENDING NOTE  Colin Estevez Jr., DO, FACEP, FAAEM        CHIEF COMPLAINT  Chief Complaint   Patient presents with    Sweats     Pt presents to the ED with complaints of excessive sweating. Pt had a heart cath and was discharged yesterday dizzy. Pts wife states he was hot to the touch last night. Pts wife states that the dizziness and slurred speech started today round 1500. BGL was 301 at home. Pt now complaining of chest pain.     Dizziness        HISTORY OF PRESENT ILLNESS  Osmar Mckenna is a 80 y.o. male who presents to the ED for evaluation of lightheadedness and flank pain near syncope.  Patient had cardiac cath done yesterday and was discharged and states that he felt lightheaded when he left.  States at times feels like he is going to pass out but never actually passes out.  Last night started having episodes where he thought he was having fevers and had a normal temperature at home though this was on the forehead and when wife checked axilla is 103.  Soaked through the bedding and his chair a few times throughout the day at night.  Continues to have the lightheadedness.  No nausea or vomiting.  Denies any chest pain or shortness of breath.  Wife was concerned that his speech seemed a little slurred this afternoon as well and feels like almost like there is something in his throat.  Denies feeling like this in the past.    Nursing/triage notes reviewed.  No other complaints, modifying factors or associated symptoms.     REVIEW OF SYSTEMS:  All systems are reviewed and are negative unless noted in the HPI.    PAST MEDICAL HISTORY  Past Medical History:   Diagnosis Date    Diabetes mellitus (HCC)     Hypertension        SURGICAL HISTORY  Past Surgical History:   Procedure Laterality Date    CARDIAC PROCEDURE Left 6/23/2025    Left heart cath / coronary angiography performed by Sebastian Alanis MD at [facility] CARDIAC CATH LAB    PACEMAKER PLACEMENT         FAMILY HISTORY  No family

## 2025-06-26 LAB
ALBUMIN SERPL-MCNC: 3.8 G/DL (ref 3.4–5)
ALBUMIN/GLOB SERPL: 1.2 {RATIO}
ALP SERPL-CCNC: 102 U/L (ref 40–129)
ALT SERPL-CCNC: 29 U/L (ref 10–40)
ANION GAP SERPL CALCULATED.3IONS-SCNC: 18 MMOL/L (ref 3–16)
AST SERPL-CCNC: 52 U/L (ref 15–37)
BASOPHILS # BLD: 0.02 K/UL (ref 0–0.2)
BASOPHILS NFR BLD: 0 %
BILIRUB SERPL-MCNC: 1.6 MG/DL (ref 0–1)
BUN SERPL-MCNC: 20 MG/DL (ref 7–20)
CALCIUM SERPL-MCNC: 9.6 MG/DL (ref 8.3–10.6)
CHLORIDE SERPL-SCNC: 103 MMOL/L (ref 99–110)
CO2 SERPL-SCNC: 20 MMOL/L (ref 21–32)
CREAT SERPL-MCNC: 0.9 MG/DL (ref 0.8–1.3)
EKG ATRIAL RATE: 72 BPM
EKG ATRIAL RATE: 75 BPM
EKG ATRIAL RATE: 76 BPM
EKG DIAGNOSIS: NORMAL
EKG P AXIS: 76 DEGREES
EKG P AXIS: 82 DEGREES
EKG P AXIS: 98 DEGREES
EKG P-R INTERVAL: 198 MS
EKG P-R INTERVAL: 212 MS
EKG P-R INTERVAL: 212 MS
EKG Q-T INTERVAL: 436 MS
EKG Q-T INTERVAL: 464 MS
EKG Q-T INTERVAL: 500 MS
EKG QRS DURATION: 100 MS
EKG QRS DURATION: 102 MS
EKG QRS DURATION: 102 MS
EKG QTC CALCULATION (BAZETT): 490 MS
EKG QTC CALCULATION (BAZETT): 518 MS
EKG QTC CALCULATION (BAZETT): 547 MS
EKG R AXIS: -60 DEGREES
EKG R AXIS: -64 DEGREES
EKG R AXIS: -68 DEGREES
EKG T AXIS: -15 DEGREES
EKG T AXIS: 30 DEGREES
EKG T AXIS: 32 DEGREES
EKG VENTRICULAR RATE: 72 BPM
EKG VENTRICULAR RATE: 75 BPM
EKG VENTRICULAR RATE: 76 BPM
EOSINOPHIL # BLD: 0.07 K/UL (ref 0–0.6)
EOSINOPHILS RELATIVE PERCENT: 1 %
ERYTHROCYTE [DISTWIDTH] IN BLOOD BY AUTOMATED COUNT: 12.8 % (ref 12.4–15.4)
GFR, ESTIMATED: 83 ML/MIN/1.73M2
GLUCOSE BLD-MCNC: 126 MG/DL (ref 70–99)
GLUCOSE BLD-MCNC: 172 MG/DL (ref 70–99)
GLUCOSE BLD-MCNC: 250 MG/DL (ref 70–99)
GLUCOSE SERPL-MCNC: 157 MG/DL (ref 70–99)
HCT VFR BLD AUTO: 39.9 % (ref 40.5–52.5)
HGB BLD-MCNC: 13.4 G/DL (ref 13.5–17.5)
IMM GRANULOCYTES # BLD AUTO: 0.03 K/UL (ref 0–0.5)
IMM GRANULOCYTES NFR BLD: 0 %
LACTATE BLDV-SCNC: 2 MMOL/L (ref 0.4–2)
LYMPHOCYTES NFR BLD: 0.84 K/UL (ref 1–5.1)
LYMPHOCYTES RELATIVE PERCENT: 8 %
MCH RBC QN AUTO: 30 PG (ref 26–34)
MCHC RBC AUTO-ENTMCNC: 33.6 G/DL (ref 31–36)
MCV RBC AUTO: 89.5 FL (ref 80–100)
MONOCYTES NFR BLD: 0.86 K/UL (ref 0–1.3)
MONOCYTES NFR BLD: 8 %
NEUTROPHILS NFR BLD: 84 %
NEUTS SEG NFR BLD: 9.29 K/UL (ref 1.7–7.7)
PLATELET # BLD AUTO: 288 K/UL (ref 135–450)
PMV BLD AUTO: 9.3 FL (ref 9.4–12.4)
POTASSIUM SERPL-SCNC: 3.7 MMOL/L (ref 3.5–5.1)
PROT SERPL-MCNC: 7 G/DL (ref 6.4–8.2)
RBC # BLD AUTO: 4.46 M/UL (ref 4.2–5.9)
SODIUM SERPL-SCNC: 141 MMOL/L (ref 136–145)
WBC OTHER # BLD: 11.1 K/UL (ref 4–11)

## 2025-06-26 PROCEDURE — 6360000002 HC RX W HCPCS: Performed by: STUDENT IN AN ORGANIZED HEALTH CARE EDUCATION/TRAINING PROGRAM

## 2025-06-26 PROCEDURE — 82962 GLUCOSE BLOOD TEST: CPT

## 2025-06-26 PROCEDURE — 36415 COLL VENOUS BLD VENIPUNCTURE: CPT

## 2025-06-26 PROCEDURE — 83605 ASSAY OF LACTIC ACID: CPT

## 2025-06-26 PROCEDURE — 80053 COMPREHEN METABOLIC PANEL: CPT

## 2025-06-26 PROCEDURE — 85025 COMPLETE CBC W/AUTO DIFF WBC: CPT

## 2025-06-26 PROCEDURE — 93010 ELECTROCARDIOGRAM REPORT: CPT | Performed by: INTERNAL MEDICINE

## 2025-06-26 PROCEDURE — 2500000003 HC RX 250 WO HCPCS: Performed by: STUDENT IN AN ORGANIZED HEALTH CARE EDUCATION/TRAINING PROGRAM

## 2025-06-26 PROCEDURE — 2580000003 HC RX 258: Performed by: STUDENT IN AN ORGANIZED HEALTH CARE EDUCATION/TRAINING PROGRAM

## 2025-06-26 PROCEDURE — 6370000000 HC RX 637 (ALT 250 FOR IP): Performed by: STUDENT IN AN ORGANIZED HEALTH CARE EDUCATION/TRAINING PROGRAM

## 2025-06-26 PROCEDURE — 2060000000 HC ICU INTERMEDIATE R&B

## 2025-06-26 RX ADMIN — SODIUM CHLORIDE, PRESERVATIVE FREE 10 ML: 5 INJECTION INTRAVENOUS at 21:35

## 2025-06-26 RX ADMIN — PROCHLORPERAZINE EDISYLATE 10 MG: 5 INJECTION INTRAMUSCULAR; INTRAVENOUS at 04:55

## 2025-06-26 RX ADMIN — CEFTRIAXONE SODIUM 2000 MG: 2 INJECTION, POWDER, FOR SOLUTION INTRAMUSCULAR; INTRAVENOUS at 00:41

## 2025-06-26 RX ADMIN — SODIUM CHLORIDE, PRESERVATIVE FREE 10 ML: 5 INJECTION INTRAVENOUS at 08:25

## 2025-06-26 RX ADMIN — METRONIDAZOLE 500 MG: 500 INJECTION, SOLUTION INTRAVENOUS at 08:24

## 2025-06-26 RX ADMIN — METRONIDAZOLE 500 MG: 500 INJECTION, SOLUTION INTRAVENOUS at 16:02

## 2025-06-26 RX ADMIN — ACETAMINOPHEN 650 MG: 325 TABLET ORAL at 16:01

## 2025-06-26 RX ADMIN — METRONIDAZOLE 500 MG: 500 INJECTION, SOLUTION INTRAVENOUS at 01:35

## 2025-06-26 RX ADMIN — MONTELUKAST 10 MG: 10 TABLET, FILM COATED ORAL at 21:35

## 2025-06-26 RX ADMIN — ENOXAPARIN SODIUM 40 MG: 100 INJECTION SUBCUTANEOUS at 08:26

## 2025-06-26 RX ADMIN — INSULIN LISPRO 2 UNITS: 100 INJECTION, SOLUTION INTRAVENOUS; SUBCUTANEOUS at 21:34

## 2025-06-26 ASSESSMENT — PAIN DESCRIPTION - DESCRIPTORS: DESCRIPTORS: ACHING

## 2025-06-26 ASSESSMENT — PAIN DESCRIPTION - LOCATION: LOCATION: ABDOMEN

## 2025-06-26 ASSESSMENT — PAIN SCALES - GENERAL
PAINLEVEL_OUTOF10: 0
PAINLEVEL_OUTOF10: 4

## 2025-06-26 ASSESSMENT — PAIN DESCRIPTION - ONSET: ONSET: AWAKENED FROM SLEEP

## 2025-06-26 ASSESSMENT — PAIN - FUNCTIONAL ASSESSMENT: PAIN_FUNCTIONAL_ASSESSMENT: ACTIVITIES ARE NOT PREVENTED

## 2025-06-26 ASSESSMENT — PAIN SCALES - WONG BAKER: WONGBAKER_NUMERICALRESPONSE: HURTS LITTLE MORE

## 2025-06-26 ASSESSMENT — PAIN DESCRIPTION - ORIENTATION: ORIENTATION: MID

## 2025-06-26 ASSESSMENT — PAIN DESCRIPTION - PAIN TYPE: TYPE: ACUTE PAIN

## 2025-06-26 ASSESSMENT — PAIN DESCRIPTION - FREQUENCY: FREQUENCY: INTERMITTENT

## 2025-06-26 NOTE — ED NOTES
ED TO INPATIENT SBAR HANDOFF    Patient Name: Osmar Mckenna   :  1945  80 y.o.   MRN:  [id number]  Preferred Name    ED Room #:    Family/Caregiver Present yes   Restraints no   Sitter no   Sepsis Risk Score      Situation  Code Status: Prior No additional code details.    Allergies: Penicillins  Weight: Patient Vitals for the past 96 hrs (Last 3 readings):   Weight   25 1748 85.2 kg (187 lb 12.8 oz)     Arrived from: home  Chief Complaint:   Chief Complaint   Patient presents with    Sweats     Pt presents to the ED with complaints of excessive sweating. Pt had a heart cath and was discharged yesterday dizzy. Pts wife states he was hot to the touch last night. Pts wife states that the dizziness and slurred speech started today round 1500. BGL was 301 at home. Pt now complaining of chest pain.     Dizziness     Hospital Problem/Diagnosis:  Active Problems:    * No active hospital problems. *  Resolved Problems:    * No resolved hospital problems. *    Imaging:   CTA Head Neck W/Contrast   Final Result      Neck   1.  Mild atherosclerotic disease with no hemodynamically significant carotid or vertebral artery stenosis.      Head   1.  No evidence of large vessel occlusion in the intracranial circulation.      Electronically signed by Long Oakes      CTA CHEST ABDOMEN W CONTRAST   Final Result      1. 5.1 x 3.5 cm hypodense lesion in the anterior aspect of the liver, highly suspicious for malignancy.   2. Small amount stranding of fat in the right anterior groin. No traumatic vascular injury.   3. No acute abnormality within the chest.      Electronically signed by Wily Swann DO      CT Head W/O Contrast   Final Result      1.  No evidence of acute intracranial hemorrhage or mass effect.      Electronically signed by Long Oakes      XR CHEST PORTABLE   Final Result      Suggestion of CHF with fluid overload.  Underlying infiltrate not excluded.       Electronically signed by Lokesh Branham

## 2025-06-26 NOTE — H&P
V2.0  History and Physical      Name:  Osmar Mckenna /Age/Sex: 1945  (80 y.o. male)   MRN & CSN:  [id number] & [id number] Encounter Date/Time: 2025 10:29 PM EDT   Location:  [facility]-01 PCP: Masoud Garcia MD       Hospital Day: 1    Assessment and Plan:   Osmar Mckenna is a 80 y.o. male with a pmh of hypertension, HFpEF, paroxysmal atrial fibrillation on Eliquis, pacemaker in place,  asthma,, type II DM  who presents with Liver lesion    Hospital Problems           Last Modified POA    * (Principal) Liver lesion 2025 Yes     5.1 x 3.5 cm hypodense hepatic lesion -suspicious for malignancy versus hepatic abscess -lesion not seen on CT 2025  Subacute generalized weakness, fevers, chills, encephalopathy-both infection and malignancy could cause these -CT head negative  Leukocytosis -admit WBC 15  Mild HAGMA  Chronically elevated total bilirubin -stable -baseline around 1.5-2.  Admit level 2.3  Type II DM -A1c 7.4 -on glipizide and metformin  GIBRAN on CKD stage II -likely prerenal patient appears dry on exam  Hypertension-now somewhat soft -on nifedipine and lisinopril at home  Paroxysmal atrial fibrillation-on Eliquis  Pacemaker in place  Asthma -on Singulair    Plan:  Admit to hospital  Check MRI liver protocol  Start Rocephin and Flagyl for potential hepatic abscess  Check blood cultures, lactate, ammonia, BHB  Hold home Eliquis pending MRI results -need better characterization of the lesion to figure out next steps  Hold home nifedipine and lisinopril given soft blood pressure -status post 2 L IV fluid in ED  Hold home Lipitor until we figure out what the lesion is  Trend renal function  N.p.o. midnight    Disposition:   Current Living situation: Home  Expected Disposition: Home versus home health care versus SNF  Estimated D/C: 3 to 4 days    Diet ADULT DIET; Regular  Diet NPO   DVT Prophylaxis [x] Lovenox, []  Heparin, [] SCDs, [] Ambulation,  [] Eliquis, []

## 2025-06-26 NOTE — CARE COORDINATION
Case Management Assessment  Initial Evaluation    Date/Time of Evaluation: 6/26/2025 4:05 PM  Assessment Completed by: Cherry Lawson RN    If patient is discharged prior to next notation, then this note serves as note for discharge by case management.    Patient Name: Osmar Mckenna                   YOB: 1945  Diagnosis: Lightheadedness [R42]  Slurred speech [R47.81]  Liver mass [R16.0]  Flank pain [R10.9]  Liver lesion [K76.9]  Near syncope [R55]                   Date / Time: 6/25/2025  5:46 PM    Patient Admission Status: Inpatient   Readmission Risk (Low < 19, Mod (19-27), High > 27): Readmission Risk Score: 15.7    Current PCP: Masoud Garcia MD  PCP verified by CM? Yes    Chart Reviewed: Yes      History Provided by: Patient  Patient Orientation: Alert and Oriented    Patient Cognition: Alert    Hospitalization in the last 30 days (Readmission):  yes   If yes, Readmission Assessment in CM Navigator will be completed.    Advance Directives:      Code Status: Full Code   Patient's Primary Decision Maker is: Legal Next of Kin      Discharge Planning:    Patient lives with: Spouse/Significant Other Type of Home: House  Primary Care Giver: Self  Patient Support Systems include: Spouse/Significant Other   Current Financial resources: Medicare  Current community resources: None  Current services prior to admission: None            Current DME:              Type of Home Care services:  None    ADLS  Prior functional level: Independent in ADLs/IADLs  Current functional level: Independent in ADLs/IADLs    PT AM-PAC:   /24  OT AM-PAC:   /24    Family can provide assistance at DC: Yes  Would you like Case Management to discuss the discharge plan with any other family members/significant others, and if so, who? No  Plans to Return to Present Housing: Yes  Other Identified Issues/Barriers to RETURNING to current housing: none  Potential Assistance needed at discharge: Home Care

## 2025-06-27 ENCOUNTER — APPOINTMENT (OUTPATIENT)
Age: 80
DRG: 871 | End: 2025-06-27
Payer: MEDICARE

## 2025-06-27 LAB
ALBUMIN SERPL-MCNC: 3.5 G/DL (ref 3.4–5)
ALBUMIN/GLOB SERPL: 1.1 {RATIO}
ALP SERPL-CCNC: 110 U/L (ref 40–129)
ALT SERPL-CCNC: 37 U/L (ref 10–40)
ANION GAP SERPL CALCULATED.3IONS-SCNC: 21 MMOL/L (ref 3–16)
AST SERPL-CCNC: 58 U/L (ref 15–37)
BASOPHILS # BLD: 0.01 K/UL (ref 0–0.2)
BASOPHILS NFR BLD: 0 %
BILIRUB SERPL-MCNC: 1.1 MG/DL (ref 0–1)
BUN SERPL-MCNC: 21 MG/DL (ref 7–20)
CALCIUM SERPL-MCNC: 9.4 MG/DL (ref 8.3–10.6)
CHLORIDE SERPL-SCNC: 100 MMOL/L (ref 99–110)
CO2 SERPL-SCNC: 16 MMOL/L (ref 21–32)
CREAT SERPL-MCNC: 0.8 MG/DL (ref 0.8–1.3)
EOSINOPHIL # BLD: 0.12 K/UL (ref 0–0.6)
EOSINOPHILS RELATIVE PERCENT: 1 %
ERYTHROCYTE [DISTWIDTH] IN BLOOD BY AUTOMATED COUNT: 12.8 % (ref 12.4–15.4)
EST. AVERAGE GLUCOSE BLD GHB EST-MCNC: 163 MG/DL
GFR, ESTIMATED: 89 ML/MIN/1.73M2
GLUCOSE BLD-MCNC: 190 MG/DL (ref 70–99)
GLUCOSE BLD-MCNC: 266 MG/DL (ref 70–99)
GLUCOSE BLD-MCNC: 275 MG/DL (ref 70–99)
GLUCOSE BLD-MCNC: 320 MG/DL (ref 70–99)
GLUCOSE SERPL-MCNC: 182 MG/DL (ref 70–99)
HBA1C MFR BLD: 7.3 %
HCT VFR BLD AUTO: 40.4 % (ref 40.5–52.5)
HGB BLD-MCNC: 13.7 G/DL (ref 13.5–17.5)
IMM GRANULOCYTES # BLD AUTO: 0.04 K/UL (ref 0–0.5)
IMM GRANULOCYTES NFR BLD: 0 %
LYMPHOCYTES NFR BLD: 0.69 K/UL (ref 1–5.1)
LYMPHOCYTES RELATIVE PERCENT: 6 %
MAGNESIUM SERPL-MCNC: 1.7 MG/DL (ref 1.8–2.4)
MCH RBC QN AUTO: 29.7 PG (ref 26–34)
MCHC RBC AUTO-ENTMCNC: 33.9 G/DL (ref 31–36)
MCV RBC AUTO: 87.4 FL (ref 80–100)
MONOCYTES NFR BLD: 0.99 K/UL (ref 0–1.3)
MONOCYTES NFR BLD: 9 %
NEUTROPHILS NFR BLD: 83 %
NEUTS SEG NFR BLD: 9.35 K/UL (ref 1.7–7.7)
PLATELET # BLD AUTO: 300 K/UL (ref 135–450)
PMV BLD AUTO: 9.2 FL (ref 9.4–12.4)
POTASSIUM SERPL-SCNC: 3.5 MMOL/L (ref 3.5–5.1)
PROT SERPL-MCNC: 6.7 G/DL (ref 6.4–8.2)
RBC # BLD AUTO: 4.62 M/UL (ref 4.2–5.9)
SODIUM SERPL-SCNC: 137 MMOL/L (ref 136–145)
WBC OTHER # BLD: 11.2 K/UL (ref 4–11)

## 2025-06-27 PROCEDURE — A9581 GADOXETATE DISODIUM INJ: HCPCS | Performed by: STUDENT IN AN ORGANIZED HEALTH CARE EDUCATION/TRAINING PROGRAM

## 2025-06-27 PROCEDURE — 76015 MR SFTY MPLT&/FB ASMT STF EA: CPT

## 2025-06-27 PROCEDURE — 6360000002 HC RX W HCPCS: Performed by: STUDENT IN AN ORGANIZED HEALTH CARE EDUCATION/TRAINING PROGRAM

## 2025-06-27 PROCEDURE — 85025 COMPLETE CBC W/AUTO DIFF WBC: CPT

## 2025-06-27 PROCEDURE — 6370000000 HC RX 637 (ALT 250 FOR IP): Performed by: FAMILY MEDICINE

## 2025-06-27 PROCEDURE — 2580000003 HC RX 258: Performed by: STUDENT IN AN ORGANIZED HEALTH CARE EDUCATION/TRAINING PROGRAM

## 2025-06-27 PROCEDURE — 82962 GLUCOSE BLOOD TEST: CPT

## 2025-06-27 PROCEDURE — 83735 ASSAY OF MAGNESIUM: CPT

## 2025-06-27 PROCEDURE — 6360000004 HC RX CONTRAST MEDICATION: Performed by: STUDENT IN AN ORGANIZED HEALTH CARE EDUCATION/TRAINING PROGRAM

## 2025-06-27 PROCEDURE — 80053 COMPREHEN METABOLIC PANEL: CPT

## 2025-06-27 PROCEDURE — 6370000000 HC RX 637 (ALT 250 FOR IP): Performed by: STUDENT IN AN ORGANIZED HEALTH CARE EDUCATION/TRAINING PROGRAM

## 2025-06-27 PROCEDURE — 2500000003 HC RX 250 WO HCPCS: Performed by: STUDENT IN AN ORGANIZED HEALTH CARE EDUCATION/TRAINING PROGRAM

## 2025-06-27 PROCEDURE — 36415 COLL VENOUS BLD VENIPUNCTURE: CPT

## 2025-06-27 PROCEDURE — 2060000000 HC ICU INTERMEDIATE R&B

## 2025-06-27 RX ORDER — ACETAMINOPHEN 325 MG/1
650 TABLET ORAL EVERY 4 HOURS PRN
Status: DISCONTINUED | OUTPATIENT
Start: 2025-06-27 | End: 2025-07-02 | Stop reason: HOSPADM

## 2025-06-27 RX ADMIN — SODIUM CHLORIDE, PRESERVATIVE FREE 10 ML: 5 INJECTION INTRAVENOUS at 09:01

## 2025-06-27 RX ADMIN — SODIUM CHLORIDE, PRESERVATIVE FREE 10 ML: 5 INJECTION INTRAVENOUS at 20:58

## 2025-06-27 RX ADMIN — CEFTRIAXONE SODIUM 2000 MG: 2 INJECTION, POWDER, FOR SOLUTION INTRAMUSCULAR; INTRAVENOUS at 00:38

## 2025-06-27 RX ADMIN — METRONIDAZOLE 500 MG: 500 INJECTION, SOLUTION INTRAVENOUS at 18:05

## 2025-06-27 RX ADMIN — GADOXETATE DISODIUM 8.5 ML: 181.43 INJECTION, SOLUTION INTRAVENOUS at 10:25

## 2025-06-27 RX ADMIN — PROCHLORPERAZINE EDISYLATE 10 MG: 5 INJECTION INTRAMUSCULAR; INTRAVENOUS at 18:51

## 2025-06-27 RX ADMIN — ENOXAPARIN SODIUM 40 MG: 100 INJECTION SUBCUTANEOUS at 09:00

## 2025-06-27 RX ADMIN — METRONIDAZOLE 500 MG: 500 INJECTION, SOLUTION INTRAVENOUS at 01:07

## 2025-06-27 RX ADMIN — INSULIN LISPRO 3 UNITS: 100 INJECTION, SOLUTION INTRAVENOUS; SUBCUTANEOUS at 20:57

## 2025-06-27 RX ADMIN — INSULIN LISPRO 2 UNITS: 100 INJECTION, SOLUTION INTRAVENOUS; SUBCUTANEOUS at 17:57

## 2025-06-27 RX ADMIN — MONTELUKAST 10 MG: 10 TABLET, FILM COATED ORAL at 20:57

## 2025-06-27 RX ADMIN — METRONIDAZOLE 500 MG: 500 INJECTION, SOLUTION INTRAVENOUS at 08:53

## 2025-06-27 RX ADMIN — INSULIN LISPRO 2 UNITS: 100 INJECTION, SOLUTION INTRAVENOUS; SUBCUTANEOUS at 12:45

## 2025-06-27 RX ADMIN — CEFTRIAXONE SODIUM 2000 MG: 2 INJECTION, POWDER, FOR SOLUTION INTRAMUSCULAR; INTRAVENOUS at 21:56

## 2025-06-27 RX ADMIN — ACETAMINOPHEN 650 MG: 325 TABLET ORAL at 19:27

## 2025-06-27 RX ADMIN — METRONIDAZOLE 500 MG: 500 INJECTION, SOLUTION INTRAVENOUS at 23:31

## 2025-06-27 ASSESSMENT — PAIN DESCRIPTION - FREQUENCY: FREQUENCY: INTERMITTENT

## 2025-06-27 ASSESSMENT — PAIN SCALES - GENERAL
PAINLEVEL_OUTOF10: 0
PAINLEVEL_OUTOF10: 3
PAINLEVEL_OUTOF10: 0
PAINLEVEL_OUTOF10: 0

## 2025-06-27 ASSESSMENT — PAIN DESCRIPTION - DESCRIPTORS: DESCRIPTORS: ACHING

## 2025-06-27 ASSESSMENT — PAIN - FUNCTIONAL ASSESSMENT: PAIN_FUNCTIONAL_ASSESSMENT: ACTIVITIES ARE NOT PREVENTED

## 2025-06-27 ASSESSMENT — PAIN DESCRIPTION - PAIN TYPE: TYPE: ACUTE PAIN

## 2025-06-27 ASSESSMENT — PAIN DESCRIPTION - LOCATION: LOCATION: ABDOMEN

## 2025-06-27 ASSESSMENT — PAIN DESCRIPTION - ORIENTATION: ORIENTATION: RIGHT

## 2025-06-28 ENCOUNTER — APPOINTMENT (OUTPATIENT)
Age: 80
DRG: 871 | End: 2025-06-28
Attending: PHYSICIAN ASSISTANT
Payer: MEDICARE

## 2025-06-28 LAB
ALBUMIN SERPL-MCNC: 3.3 G/DL (ref 3.4–5)
ALBUMIN/GLOB SERPL: 1 {RATIO}
ALP SERPL-CCNC: 112 U/L (ref 40–129)
ALT SERPL-CCNC: 38 U/L (ref 10–40)
ANION GAP SERPL CALCULATED.3IONS-SCNC: 17 MMOL/L (ref 3–16)
AST SERPL-CCNC: 59 U/L (ref 15–37)
BASOPHILS # BLD: 0.02 K/UL (ref 0–0.2)
BASOPHILS NFR BLD: 0 %
BILIRUB SERPL-MCNC: 0.9 MG/DL (ref 0–1)
BUN SERPL-MCNC: 20 MG/DL (ref 7–20)
CALCIUM SERPL-MCNC: 9.3 MG/DL (ref 8.3–10.6)
CHLORIDE SERPL-SCNC: 100 MMOL/L (ref 99–110)
CO2 SERPL-SCNC: 19 MMOL/L (ref 21–32)
CREAT SERPL-MCNC: 0.8 MG/DL (ref 0.8–1.3)
EOSINOPHIL # BLD: 0.1 K/UL (ref 0–0.6)
EOSINOPHILS RELATIVE PERCENT: 1 %
ERYTHROCYTE [DISTWIDTH] IN BLOOD BY AUTOMATED COUNT: 12.8 % (ref 12.4–15.4)
GFR, ESTIMATED: 88 ML/MIN/1.73M2
GLUCOSE BLD-MCNC: 187 MG/DL (ref 70–99)
GLUCOSE BLD-MCNC: 214 MG/DL (ref 70–99)
GLUCOSE BLD-MCNC: 220 MG/DL (ref 70–99)
GLUCOSE BLD-MCNC: 224 MG/DL (ref 70–99)
GLUCOSE SERPL-MCNC: 215 MG/DL (ref 70–99)
HCT VFR BLD AUTO: 42.6 % (ref 40.5–52.5)
HGB BLD-MCNC: 14.3 G/DL (ref 13.5–17.5)
IMM GRANULOCYTES # BLD AUTO: 0.03 K/UL (ref 0–0.5)
IMM GRANULOCYTES NFR BLD: 0 %
LYMPHOCYTES NFR BLD: 0.8 K/UL (ref 1–5.1)
LYMPHOCYTES RELATIVE PERCENT: 6 %
MAGNESIUM SERPL-MCNC: 1.7 MG/DL (ref 1.8–2.4)
MCH RBC QN AUTO: 29.7 PG (ref 26–34)
MCHC RBC AUTO-ENTMCNC: 33.6 G/DL (ref 31–36)
MCV RBC AUTO: 88.4 FL (ref 80–100)
MONOCYTES NFR BLD: 1.18 K/UL (ref 0–1.3)
MONOCYTES NFR BLD: 9 %
NEUTROPHILS NFR BLD: 84 %
NEUTS SEG NFR BLD: 11.2 K/UL (ref 1.7–7.7)
PLATELET # BLD AUTO: 266 K/UL (ref 135–450)
PMV BLD AUTO: 9.4 FL (ref 9.4–12.4)
POTASSIUM SERPL-SCNC: 3.2 MMOL/L (ref 3.5–5.1)
POTASSIUM SERPL-SCNC: 3.4 MMOL/L (ref 3.5–5.1)
PROT SERPL-MCNC: 6.6 G/DL (ref 6.4–8.2)
RBC # BLD AUTO: 4.82 M/UL (ref 4.2–5.9)
SODIUM SERPL-SCNC: 136 MMOL/L (ref 136–145)
WBC OTHER # BLD: 13.3 K/UL (ref 4–11)

## 2025-06-28 PROCEDURE — 2060000000 HC ICU INTERMEDIATE R&B

## 2025-06-28 PROCEDURE — 87070 CULTURE OTHR SPECIMN AEROBIC: CPT

## 2025-06-28 PROCEDURE — 83735 ASSAY OF MAGNESIUM: CPT

## 2025-06-28 PROCEDURE — 82962 GLUCOSE BLOOD TEST: CPT

## 2025-06-28 PROCEDURE — 87075 CULTR BACTERIA EXCEPT BLOOD: CPT

## 2025-06-28 PROCEDURE — 80053 COMPREHEN METABOLIC PANEL: CPT

## 2025-06-28 PROCEDURE — 85025 COMPLETE CBC W/AUTO DIFF WBC: CPT

## 2025-06-28 PROCEDURE — 6370000000 HC RX 637 (ALT 250 FOR IP): Performed by: STUDENT IN AN ORGANIZED HEALTH CARE EDUCATION/TRAINING PROGRAM

## 2025-06-28 PROCEDURE — 6360000002 HC RX W HCPCS: Performed by: STUDENT IN AN ORGANIZED HEALTH CARE EDUCATION/TRAINING PROGRAM

## 2025-06-28 PROCEDURE — 2580000003 HC RX 258: Performed by: STUDENT IN AN ORGANIZED HEALTH CARE EDUCATION/TRAINING PROGRAM

## 2025-06-28 PROCEDURE — 0F9130Z DRAINAGE OF RIGHT LOBE LIVER WITH DRAINAGE DEVICE, PERCUTANEOUS APPROACH: ICD-10-PCS | Performed by: RADIOLOGY

## 2025-06-28 PROCEDURE — 87077 CULTURE AEROBIC IDENTIFY: CPT

## 2025-06-28 PROCEDURE — C1769 GUIDE WIRE: HCPCS

## 2025-06-28 PROCEDURE — 2500000003 HC RX 250 WO HCPCS: Performed by: STUDENT IN AN ORGANIZED HEALTH CARE EDUCATION/TRAINING PROGRAM

## 2025-06-28 PROCEDURE — 99223 1ST HOSP IP/OBS HIGH 75: CPT | Performed by: SURGERY

## 2025-06-28 PROCEDURE — 36415 COLL VENOUS BLD VENIPUNCTURE: CPT

## 2025-06-28 PROCEDURE — 87205 SMEAR GRAM STAIN: CPT

## 2025-06-28 PROCEDURE — 6360000002 HC RX W HCPCS: Performed by: RADIOLOGY

## 2025-06-28 PROCEDURE — 6360000002 HC RX W HCPCS: Performed by: NURSE PRACTITIONER

## 2025-06-28 PROCEDURE — 84132 ASSAY OF SERUM POTASSIUM: CPT

## 2025-06-28 RX ORDER — MAGNESIUM SULFATE IN WATER 40 MG/ML
2000 INJECTION, SOLUTION INTRAVENOUS ONCE
Status: COMPLETED | OUTPATIENT
Start: 2025-06-28 | End: 2025-06-28

## 2025-06-28 RX ORDER — MIDAZOLAM HYDROCHLORIDE 1 MG/ML
INJECTION, SOLUTION INTRAMUSCULAR; INTRAVENOUS PRN
Status: COMPLETED | OUTPATIENT
Start: 2025-06-28 | End: 2025-06-28

## 2025-06-28 RX ORDER — LIDOCAINE HYDROCHLORIDE 10 MG/ML
INJECTION, SOLUTION EPIDURAL; INFILTRATION; INTRACAUDAL; PERINEURAL PRN
Status: COMPLETED | OUTPATIENT
Start: 2025-06-28 | End: 2025-06-28

## 2025-06-28 RX ORDER — INSULIN LISPRO 100 [IU]/ML
0-8 INJECTION, SOLUTION INTRAVENOUS; SUBCUTANEOUS
Status: DISCONTINUED | OUTPATIENT
Start: 2025-06-28 | End: 2025-06-29 | Stop reason: SDUPTHER

## 2025-06-28 RX ORDER — FENTANYL CITRATE 50 UG/ML
INJECTION, SOLUTION INTRAMUSCULAR; INTRAVENOUS PRN
Status: COMPLETED | OUTPATIENT
Start: 2025-06-28 | End: 2025-06-28

## 2025-06-28 RX ADMIN — LIDOCAINE HYDROCHLORIDE 5 ML: 10 INJECTION, SOLUTION EPIDURAL; INFILTRATION; INTRACAUDAL; PERINEURAL at 10:28

## 2025-06-28 RX ADMIN — CEFTRIAXONE SODIUM 2000 MG: 2 INJECTION, POWDER, FOR SOLUTION INTRAMUSCULAR; INTRAVENOUS at 23:34

## 2025-06-28 RX ADMIN — MAGNESIUM SULFATE HEPTAHYDRATE 2000 MG: 40 INJECTION, SOLUTION INTRAVENOUS at 17:55

## 2025-06-28 RX ADMIN — FENTANYL CITRATE 50 MCG: 50 INJECTION, SOLUTION INTRAMUSCULAR; INTRAVENOUS at 10:24

## 2025-06-28 RX ADMIN — SODIUM CHLORIDE, PRESERVATIVE FREE 10 ML: 5 INJECTION INTRAVENOUS at 07:57

## 2025-06-28 RX ADMIN — POTASSIUM CHLORIDE 40 MEQ: 1500 TABLET, EXTENDED RELEASE ORAL at 11:25

## 2025-06-28 RX ADMIN — METRONIDAZOLE 500 MG: 500 INJECTION, SOLUTION INTRAVENOUS at 16:21

## 2025-06-28 RX ADMIN — MIDAZOLAM HYDROCHLORIDE 1 MG: 1 INJECTION, SOLUTION INTRAMUSCULAR; INTRAVENOUS at 10:24

## 2025-06-28 RX ADMIN — MONTELUKAST 10 MG: 10 TABLET, FILM COATED ORAL at 20:58

## 2025-06-28 RX ADMIN — METRONIDAZOLE 500 MG: 500 INJECTION, SOLUTION INTRAVENOUS at 07:54

## 2025-06-28 RX ADMIN — LIDOCAINE HYDROCHLORIDE 5 ML: 10 INJECTION, SOLUTION EPIDURAL; INFILTRATION; INTRACAUDAL; PERINEURAL at 10:25

## 2025-06-28 RX ADMIN — OXYCODONE 2.5 MG: 5 TABLET ORAL at 11:34

## 2025-06-28 RX ADMIN — INSULIN LISPRO 2 UNITS: 100 INJECTION, SOLUTION INTRAVENOUS; SUBCUTANEOUS at 20:58

## 2025-06-28 RX ADMIN — INSULIN LISPRO 2 UNITS: 100 INJECTION, SOLUTION INTRAVENOUS; SUBCUTANEOUS at 13:02

## 2025-06-28 RX ADMIN — SODIUM CHLORIDE, PRESERVATIVE FREE 10 ML: 5 INJECTION INTRAVENOUS at 20:56

## 2025-06-28 RX ADMIN — INSULIN LISPRO 2 UNITS: 100 INJECTION, SOLUTION INTRAVENOUS; SUBCUTANEOUS at 18:12

## 2025-06-28 ASSESSMENT — PAIN SCALES - GENERAL
PAINLEVEL_OUTOF10: 6
PAINLEVEL_OUTOF10: 6
PAINLEVEL_OUTOF10: 0
PAINLEVEL_OUTOF10: 1
PAINLEVEL_OUTOF10: 5
PAINLEVEL_OUTOF10: 0
PAINLEVEL_OUTOF10: 7

## 2025-06-28 ASSESSMENT — PAIN SCALES - WONG BAKER
WONGBAKER_NUMERICALRESPONSE: NO HURT

## 2025-06-28 ASSESSMENT — PAIN - FUNCTIONAL ASSESSMENT
PAIN_FUNCTIONAL_ASSESSMENT: ACTIVITIES ARE NOT PREVENTED
PAIN_FUNCTIONAL_ASSESSMENT: PREVENTS OR INTERFERES SOME ACTIVE ACTIVITIES AND ADLS

## 2025-06-28 ASSESSMENT — PAIN DESCRIPTION - ONSET
ONSET: ON-GOING
ONSET: ON-GOING

## 2025-06-28 ASSESSMENT — PAIN DESCRIPTION - FREQUENCY
FREQUENCY: CONTINUOUS

## 2025-06-28 ASSESSMENT — PAIN DESCRIPTION - LOCATION
LOCATION: ABDOMEN

## 2025-06-28 ASSESSMENT — PAIN DESCRIPTION - DESCRIPTORS
DESCRIPTORS: SHARP
DESCRIPTORS: ACHING

## 2025-06-28 ASSESSMENT — PAIN DESCRIPTION - PAIN TYPE
TYPE: ACUTE PAIN
TYPE: ACUTE PAIN

## 2025-06-28 ASSESSMENT — PAIN DESCRIPTION - ORIENTATION
ORIENTATION: RIGHT;UPPER
ORIENTATION: RIGHT;UPPER

## 2025-06-28 NOTE — PLAN OF CARE
Problem: Chronic Conditions and Co-morbidities  Goal: Patient's chronic conditions and co-morbidity symptoms are monitored and maintained or improved  6/27/2025 2018 by Francine Linda RN  Outcome: Progressing  6/27/2025 2018 by Francine Linda RN  Outcome: Progressing     Problem: Pain  Goal: Verbalizes/displays adequate comfort level or baseline comfort level  6/27/2025 2018 by Francine Linda RN  Outcome: Progressing  6/27/2025 2018 by Francine Linda RN  Outcome: Progressing  Flowsheets (Taken 6/27/2025 1926)  Verbalizes/displays adequate comfort level or baseline comfort level:   Encourage patient to monitor pain and request assistance   Assess pain using appropriate pain scale   Administer analgesics based on type and severity of pain and evaluate response   Implement non-pharmacological measures as appropriate and evaluate response   Consider cultural and social influences on pain and pain management   Notify Licensed Independent Practitioner if interventions unsuccessful or patient reports new pain     Problem: Safety - Adult  Goal: Free from fall injury  6/27/2025 2018 by Francine Linda RN  Outcome: Progressing  6/27/2025 2018 by Francine Linda RN  Outcome: Progressing  6/27/2025 1951 by Saira Lai, RN  Outcome: Progressing     Problem: Discharge Planning  Goal: Discharge to home or other facility with appropriate resources  6/27/2025 2018 by Francine Linda RN  Outcome: Not Progressing  6/27/2025 2018 by Francine Linda RN  Outcome: Not Progressing

## 2025-06-29 LAB
ALBUMIN SERPL-MCNC: 3.3 G/DL (ref 3.4–5)
ALBUMIN/GLOB SERPL: 1.4 {RATIO}
ALP SERPL-CCNC: 106 U/L (ref 40–129)
ALT SERPL-CCNC: 39 U/L (ref 10–40)
ANION GAP SERPL CALCULATED.3IONS-SCNC: 15 MMOL/L (ref 3–16)
AST SERPL-CCNC: 50 U/L (ref 15–37)
BASOPHILS # BLD: 0.01 K/UL (ref 0–0.2)
BASOPHILS NFR BLD: 0 %
BILIRUB SERPL-MCNC: 0.7 MG/DL (ref 0–1)
BUN SERPL-MCNC: 19 MG/DL (ref 7–20)
CALCIUM SERPL-MCNC: 8.8 MG/DL (ref 8.3–10.6)
CHLORIDE SERPL-SCNC: 100 MMOL/L (ref 99–110)
CO2 SERPL-SCNC: 20 MMOL/L (ref 21–32)
CREAT SERPL-MCNC: 0.7 MG/DL (ref 0.8–1.3)
EOSINOPHIL # BLD: 0.13 K/UL (ref 0–0.6)
EOSINOPHILS RELATIVE PERCENT: 1 %
ERYTHROCYTE [DISTWIDTH] IN BLOOD BY AUTOMATED COUNT: 12.7 % (ref 12.4–15.4)
GFR, ESTIMATED: >90 ML/MIN/1.73M2
GLUCOSE BLD-MCNC: 188 MG/DL (ref 70–99)
GLUCOSE BLD-MCNC: 216 MG/DL (ref 70–99)
GLUCOSE BLD-MCNC: 251 MG/DL (ref 70–99)
GLUCOSE BLD-MCNC: 266 MG/DL (ref 70–99)
GLUCOSE SERPL-MCNC: 215 MG/DL (ref 70–99)
HCT VFR BLD AUTO: 40.2 % (ref 40.5–52.5)
HGB BLD-MCNC: 13.7 G/DL (ref 13.5–17.5)
IMM GRANULOCYTES # BLD AUTO: 0.07 K/UL (ref 0–0.5)
IMM GRANULOCYTES NFR BLD: 1 %
LYMPHOCYTES NFR BLD: 1.26 K/UL (ref 1–5.1)
LYMPHOCYTES RELATIVE PERCENT: 11 %
MAGNESIUM SERPL-MCNC: 2 MG/DL (ref 1.8–2.4)
MCH RBC QN AUTO: 29.8 PG (ref 26–34)
MCHC RBC AUTO-ENTMCNC: 34.1 G/DL (ref 31–36)
MCV RBC AUTO: 87.4 FL (ref 80–100)
MONOCYTES NFR BLD: 0.8 K/UL (ref 0–1.3)
MONOCYTES NFR BLD: 7 %
NEUTROPHILS NFR BLD: 79 %
NEUTS SEG NFR BLD: 8.75 K/UL (ref 1.7–7.7)
PLATELET # BLD AUTO: 313 K/UL (ref 135–450)
PMV BLD AUTO: 9.2 FL (ref 9.4–12.4)
POTASSIUM SERPL-SCNC: 3.9 MMOL/L (ref 3.5–5.1)
PROT SERPL-MCNC: 5.6 G/DL (ref 6.4–8.2)
RBC # BLD AUTO: 4.6 M/UL (ref 4.2–5.9)
SODIUM SERPL-SCNC: 135 MMOL/L (ref 136–145)
WBC OTHER # BLD: 11 K/UL (ref 4–11)

## 2025-06-29 PROCEDURE — 2500000003 HC RX 250 WO HCPCS: Performed by: STUDENT IN AN ORGANIZED HEALTH CARE EDUCATION/TRAINING PROGRAM

## 2025-06-29 PROCEDURE — 6370000000 HC RX 637 (ALT 250 FOR IP): Performed by: STUDENT IN AN ORGANIZED HEALTH CARE EDUCATION/TRAINING PROGRAM

## 2025-06-29 PROCEDURE — 82962 GLUCOSE BLOOD TEST: CPT

## 2025-06-29 PROCEDURE — 97530 THERAPEUTIC ACTIVITIES: CPT

## 2025-06-29 PROCEDURE — 2580000003 HC RX 258: Performed by: STUDENT IN AN ORGANIZED HEALTH CARE EDUCATION/TRAINING PROGRAM

## 2025-06-29 PROCEDURE — 97535 SELF CARE MNGMENT TRAINING: CPT

## 2025-06-29 PROCEDURE — 87040 BLOOD CULTURE FOR BACTERIA: CPT

## 2025-06-29 PROCEDURE — 80053 COMPREHEN METABOLIC PANEL: CPT

## 2025-06-29 PROCEDURE — 83735 ASSAY OF MAGNESIUM: CPT

## 2025-06-29 PROCEDURE — 97165 OT EVAL LOW COMPLEX 30 MIN: CPT

## 2025-06-29 PROCEDURE — 6370000000 HC RX 637 (ALT 250 FOR IP): Performed by: HOSPITALIST

## 2025-06-29 PROCEDURE — 2060000000 HC ICU INTERMEDIATE R&B

## 2025-06-29 PROCEDURE — 85025 COMPLETE CBC W/AUTO DIFF WBC: CPT

## 2025-06-29 PROCEDURE — 97110 THERAPEUTIC EXERCISES: CPT

## 2025-06-29 PROCEDURE — 99232 SBSQ HOSP IP/OBS MODERATE 35: CPT | Performed by: SURGERY

## 2025-06-29 PROCEDURE — 6360000002 HC RX W HCPCS: Performed by: STUDENT IN AN ORGANIZED HEALTH CARE EDUCATION/TRAINING PROGRAM

## 2025-06-29 PROCEDURE — 36415 COLL VENOUS BLD VENIPUNCTURE: CPT

## 2025-06-29 PROCEDURE — 97161 PT EVAL LOW COMPLEX 20 MIN: CPT

## 2025-06-29 RX ORDER — INSULIN LISPRO 100 [IU]/ML
2 INJECTION, SOLUTION INTRAVENOUS; SUBCUTANEOUS ONCE
Status: COMPLETED | OUTPATIENT
Start: 2025-06-29 | End: 2025-06-29

## 2025-06-29 RX ORDER — INSULIN LISPRO 100 [IU]/ML
0-16 INJECTION, SOLUTION INTRAVENOUS; SUBCUTANEOUS
Status: DISCONTINUED | OUTPATIENT
Start: 2025-06-29 | End: 2025-07-02 | Stop reason: HOSPADM

## 2025-06-29 RX ADMIN — INSULIN LISPRO 8 UNITS: 100 INJECTION, SOLUTION INTRAVENOUS; SUBCUTANEOUS at 22:16

## 2025-06-29 RX ADMIN — METRONIDAZOLE 500 MG: 500 INJECTION, SOLUTION INTRAVENOUS at 00:13

## 2025-06-29 RX ADMIN — MONTELUKAST 10 MG: 10 TABLET, FILM COATED ORAL at 22:07

## 2025-06-29 RX ADMIN — INSULIN LISPRO 8 UNITS: 100 INJECTION, SOLUTION INTRAVENOUS; SUBCUTANEOUS at 17:36

## 2025-06-29 RX ADMIN — METRONIDAZOLE 500 MG: 500 INJECTION, SOLUTION INTRAVENOUS at 17:38

## 2025-06-29 RX ADMIN — INSULIN LISPRO 2 UNITS: 100 INJECTION, SOLUTION INTRAVENOUS; SUBCUTANEOUS at 09:51

## 2025-06-29 RX ADMIN — METRONIDAZOLE 500 MG: 500 INJECTION, SOLUTION INTRAVENOUS at 23:43

## 2025-06-29 RX ADMIN — SODIUM CHLORIDE, PRESERVATIVE FREE 10 ML: 5 INJECTION INTRAVENOUS at 09:48

## 2025-06-29 RX ADMIN — METRONIDAZOLE 500 MG: 500 INJECTION, SOLUTION INTRAVENOUS at 09:51

## 2025-06-29 RX ADMIN — CEFTRIAXONE SODIUM 2000 MG: 2 INJECTION, POWDER, FOR SOLUTION INTRAMUSCULAR; INTRAVENOUS at 22:31

## 2025-06-29 RX ADMIN — INSULIN LISPRO 4 UNITS: 100 INJECTION, SOLUTION INTRAVENOUS; SUBCUTANEOUS at 12:32

## 2025-06-29 RX ADMIN — SODIUM CHLORIDE, PRESERVATIVE FREE 10 ML: 5 INJECTION INTRAVENOUS at 22:07

## 2025-06-29 ASSESSMENT — PAIN SCALES - WONG BAKER
WONGBAKER_NUMERICALRESPONSE: NO HURT

## 2025-06-29 ASSESSMENT — PAIN SCALES - GENERAL
PAINLEVEL_OUTOF10: 0

## 2025-06-29 NOTE — PLAN OF CARE
Problem: Occupational Therapy - Adult  Goal: By Discharge: Performs self-care activities at highest level of function for planned discharge setting.  See evaluation for individualized goals.  Outcome: Progressing   Celeste Boyce, OT

## 2025-06-29 NOTE — PLAN OF CARE
Problem: Physical Therapy - Adult  Goal: By Discharge: Performs mobility at highest level of function for planned discharge setting.  See evaluation for individualized goals.  Outcome: Progressing     Marily Silveira PT,DPT

## 2025-06-29 NOTE — PLAN OF CARE
Problem: Chronic Conditions and Co-morbidities  Goal: Patient's chronic conditions and co-morbidity symptoms are monitored and maintained or improved  6/29/2025 1415 by Kari Browning RN  Outcome: Progressing  6/29/2025 1415 by Kari Browning RN  Outcome: Progressing     Problem: Discharge Planning  Goal: Discharge to home or other facility with appropriate resources  6/29/2025 1415 by Kari Browning RN  Outcome: Progressing  6/29/2025 1415 by Kari Browning RN  Outcome: Progressing     Problem: Pain  Goal: Verbalizes/displays adequate comfort level or baseline comfort level  6/29/2025 1415 by Kari Browning RN  Outcome: Progressing  6/29/2025 1415 by Kari Browning RN  Outcome: Progressing     Problem: Safety - Adult  Goal: Free from fall injury  6/29/2025 1415 by Kari Browning RN  Outcome: Progressing  6/29/2025 1415 by Kari Browning RN  Outcome: Progressing     Problem: Occupational Therapy - Adult  Goal: By Discharge: Performs self-care activities at highest level of function for planned discharge setting.  See evaluation for individualized goals.  6/29/2025 1222 by Celeste Boyce OT  Outcome: Progressing

## 2025-06-29 NOTE — PLAN OF CARE
Problem: Chronic Conditions and Co-morbidities  Goal: Patient's chronic conditions and co-morbidity symptoms are monitored and maintained or improved  6/28/2025 2201 by Sloane Ndiaye RN  Outcome: Progressing  6/28/2025 1143 by Kari Browning RN  Outcome: Progressing     Problem: Discharge Planning  Goal: Discharge to home or other facility with appropriate resources  6/28/2025 2201 by Sloane Ndiaye RN  Outcome: Progressing  6/28/2025 1143 by Kari Browning RN  Outcome: Progressing     Problem: Pain  Goal: Verbalizes/displays adequate comfort level or baseline comfort level  6/28/2025 2201 by Sloane Ndiaye RN  Outcome: Progressing  6/28/2025 1143 by Kari Browning RN  Outcome: Progressing     Problem: Safety - Adult  Goal: Free from fall injury  6/28/2025 2201 by Sloane Ndiaye RN  Outcome: Progressing  6/28/2025 1143 by Kari Browning RN  Outcome: Progressing

## 2025-06-30 PROBLEM — R42 LIGHTHEADEDNESS: Status: ACTIVE | Noted: 2025-06-30

## 2025-06-30 PROBLEM — K75.0 LIVER ABSCESS: Status: ACTIVE | Noted: 2025-06-30

## 2025-06-30 PROBLEM — A41.9 SEPSIS WITHOUT ACUTE ORGAN DYSFUNCTION (HCC): Status: ACTIVE | Noted: 2025-06-30

## 2025-06-30 PROBLEM — Z95.0 CARDIAC PACEMAKER IN SITU: Status: ACTIVE | Noted: 2025-06-30

## 2025-06-30 PROBLEM — R50.9 FEVER AND CHILLS: Status: ACTIVE | Noted: 2025-06-30

## 2025-06-30 PROBLEM — R78.81 BACTEREMIA DUE TO KLEBSIELLA PNEUMONIAE: Status: ACTIVE | Noted: 2025-06-30

## 2025-06-30 PROBLEM — R10.9 FLANK PAIN: Status: ACTIVE | Noted: 2025-06-30

## 2025-06-30 PROBLEM — B96.1 BACTEREMIA DUE TO KLEBSIELLA PNEUMONIAE: Status: ACTIVE | Noted: 2025-06-30

## 2025-06-30 PROBLEM — R93.5 ABNORMAL CT OF THE ABDOMEN: Status: ACTIVE | Noted: 2025-06-30

## 2025-06-30 PROBLEM — R16.0 LIVER MASS: Status: ACTIVE | Noted: 2025-06-30

## 2025-06-30 PROBLEM — A41.59 KLEBSIELLA SEPSIS (HCC): Status: ACTIVE | Noted: 2025-06-30

## 2025-06-30 LAB
ALBUMIN SERPL-MCNC: 2.9 G/DL (ref 3.4–5)
ALBUMIN/GLOB SERPL: 1 {RATIO}
ALP SERPL-CCNC: 99 U/L (ref 40–129)
ALT SERPL-CCNC: 25 U/L (ref 10–40)
ANION GAP SERPL CALCULATED.3IONS-SCNC: 15 MMOL/L (ref 3–16)
AST SERPL-CCNC: 24 U/L (ref 15–37)
BASOPHILS # BLD: 0.02 K/UL (ref 0–0.2)
BASOPHILS NFR BLD: 0 %
BILIRUB SERPL-MCNC: 0.5 MG/DL (ref 0–1)
BUN SERPL-MCNC: 18 MG/DL (ref 7–20)
CALCIUM SERPL-MCNC: 8.7 MG/DL (ref 8.3–10.6)
CHLORIDE SERPL-SCNC: 101 MMOL/L (ref 99–110)
CO2 SERPL-SCNC: 22 MMOL/L (ref 21–32)
CREAT SERPL-MCNC: 0.7 MG/DL (ref 0.8–1.3)
CRP SERPL HS-MCNC: 156 MG/L (ref 0–5.1)
EOSINOPHIL # BLD: 0.21 K/UL (ref 0–0.6)
EOSINOPHILS RELATIVE PERCENT: 2 %
ERYTHROCYTE [DISTWIDTH] IN BLOOD BY AUTOMATED COUNT: 12.7 % (ref 12.4–15.4)
ERYTHROCYTE [SEDIMENTATION RATE] IN BLOOD BY WESTERGREN METHOD: 21 MM/HR (ref 0–20)
GFR, ESTIMATED: >90 ML/MIN/1.73M2
GLUCOSE BLD-MCNC: 223 MG/DL (ref 70–99)
GLUCOSE BLD-MCNC: 243 MG/DL (ref 70–99)
GLUCOSE BLD-MCNC: 252 MG/DL (ref 70–99)
GLUCOSE BLD-MCNC: 267 MG/DL (ref 70–99)
GLUCOSE SERPL-MCNC: 215 MG/DL (ref 70–99)
HCT VFR BLD AUTO: 37.6 % (ref 40.5–52.5)
HGB BLD-MCNC: 12.7 G/DL (ref 13.5–17.5)
IMM GRANULOCYTES # BLD AUTO: 0.12 K/UL (ref 0–0.5)
IMM GRANULOCYTES NFR BLD: 1 %
LYMPHOCYTES NFR BLD: 1.28 K/UL (ref 1–5.1)
LYMPHOCYTES RELATIVE PERCENT: 14 %
MAGNESIUM SERPL-MCNC: 1.8 MG/DL (ref 1.8–2.4)
MCH RBC QN AUTO: 29.5 PG (ref 26–34)
MCHC RBC AUTO-ENTMCNC: 33.8 G/DL (ref 31–36)
MCV RBC AUTO: 87.4 FL (ref 80–100)
MICROORGANISM SPEC CULT: ABNORMAL
MICROORGANISM SPEC CULT: ABNORMAL
MICROORGANISM SPEC CULT: NORMAL
MICROORGANISM/AGENT SPEC: ABNORMAL
MONOCYTES NFR BLD: 0.71 K/UL (ref 0–1.3)
MONOCYTES NFR BLD: 8 %
NEUTROPHILS NFR BLD: 74 %
NEUTS SEG NFR BLD: 6.58 K/UL (ref 1.7–7.7)
PLATELET # BLD AUTO: 334 K/UL (ref 135–450)
PMV BLD AUTO: 9.1 FL (ref 9.4–12.4)
POTASSIUM SERPL-SCNC: 3.1 MMOL/L (ref 3.5–5.1)
PROCALCITONIN SERPL-MCNC: 0.66 NG/ML (ref 0–0.15)
PROT SERPL-MCNC: 5.8 G/DL (ref 6.4–8.2)
RBC # BLD AUTO: 4.3 M/UL (ref 4.2–5.9)
SERVICE CMNT-IMP: NORMAL
SODIUM SERPL-SCNC: 138 MMOL/L (ref 136–145)
SPECIMEN DESCRIPTION: ABNORMAL
SPECIMEN DESCRIPTION: NORMAL
WBC OTHER # BLD: 8.9 K/UL (ref 4–11)

## 2025-06-30 PROCEDURE — 6360000002 HC RX W HCPCS: Performed by: STUDENT IN AN ORGANIZED HEALTH CARE EDUCATION/TRAINING PROGRAM

## 2025-06-30 PROCEDURE — 2500000003 HC RX 250 WO HCPCS: Performed by: STUDENT IN AN ORGANIZED HEALTH CARE EDUCATION/TRAINING PROGRAM

## 2025-06-30 PROCEDURE — 97530 THERAPEUTIC ACTIVITIES: CPT

## 2025-06-30 PROCEDURE — 85652 RBC SED RATE AUTOMATED: CPT

## 2025-06-30 PROCEDURE — 99232 SBSQ HOSP IP/OBS MODERATE 35: CPT | Performed by: SURGERY

## 2025-06-30 PROCEDURE — 2060000000 HC ICU INTERMEDIATE R&B

## 2025-06-30 PROCEDURE — 84145 PROCALCITONIN (PCT): CPT

## 2025-06-30 PROCEDURE — 83735 ASSAY OF MAGNESIUM: CPT

## 2025-06-30 PROCEDURE — 80053 COMPREHEN METABOLIC PANEL: CPT

## 2025-06-30 PROCEDURE — 97110 THERAPEUTIC EXERCISES: CPT

## 2025-06-30 PROCEDURE — 86140 C-REACTIVE PROTEIN: CPT

## 2025-06-30 PROCEDURE — 6370000000 HC RX 637 (ALT 250 FOR IP): Performed by: NURSE PRACTITIONER

## 2025-06-30 PROCEDURE — 99223 1ST HOSP IP/OBS HIGH 75: CPT | Performed by: INTERNAL MEDICINE

## 2025-06-30 PROCEDURE — 6370000000 HC RX 637 (ALT 250 FOR IP): Performed by: STUDENT IN AN ORGANIZED HEALTH CARE EDUCATION/TRAINING PROGRAM

## 2025-06-30 PROCEDURE — 36415 COLL VENOUS BLD VENIPUNCTURE: CPT

## 2025-06-30 PROCEDURE — 82962 GLUCOSE BLOOD TEST: CPT

## 2025-06-30 PROCEDURE — G0545 PR INHERENT VISIT TO INPT: HCPCS | Performed by: INTERNAL MEDICINE

## 2025-06-30 PROCEDURE — 97535 SELF CARE MNGMENT TRAINING: CPT

## 2025-06-30 PROCEDURE — 85025 COMPLETE CBC W/AUTO DIFF WBC: CPT

## 2025-06-30 PROCEDURE — 97112 NEUROMUSCULAR REEDUCATION: CPT

## 2025-06-30 PROCEDURE — 2580000003 HC RX 258: Performed by: STUDENT IN AN ORGANIZED HEALTH CARE EDUCATION/TRAINING PROGRAM

## 2025-06-30 RX ORDER — INSULIN GLARGINE 100 [IU]/ML
10 INJECTION, SOLUTION SUBCUTANEOUS DAILY
Status: DISCONTINUED | OUTPATIENT
Start: 2025-06-30 | End: 2025-07-02 | Stop reason: HOSPADM

## 2025-06-30 RX ADMIN — POTASSIUM CHLORIDE 40 MEQ: 1500 TABLET, EXTENDED RELEASE ORAL at 06:19

## 2025-06-30 RX ADMIN — ATORVASTATIN CALCIUM 20 MG: 20 TABLET, FILM COATED ORAL at 16:13

## 2025-06-30 RX ADMIN — INSULIN LISPRO 4 UNITS: 100 INJECTION, SOLUTION INTRAVENOUS; SUBCUTANEOUS at 20:42

## 2025-06-30 RX ADMIN — SODIUM CHLORIDE, PRESERVATIVE FREE 10 ML: 5 INJECTION INTRAVENOUS at 20:42

## 2025-06-30 RX ADMIN — CEFTRIAXONE SODIUM 2000 MG: 2 INJECTION, POWDER, FOR SOLUTION INTRAMUSCULAR; INTRAVENOUS at 23:41

## 2025-06-30 RX ADMIN — INSULIN LISPRO 8 UNITS: 100 INJECTION, SOLUTION INTRAVENOUS; SUBCUTANEOUS at 13:20

## 2025-06-30 RX ADMIN — MONTELUKAST 10 MG: 10 TABLET, FILM COATED ORAL at 20:42

## 2025-06-30 RX ADMIN — METRONIDAZOLE 500 MG: 500 INJECTION, SOLUTION INTRAVENOUS at 15:54

## 2025-06-30 RX ADMIN — ACETAMINOPHEN 650 MG: 325 TABLET ORAL at 06:18

## 2025-06-30 RX ADMIN — APIXABAN 5 MG: 5 TABLET, FILM COATED ORAL at 09:14

## 2025-06-30 RX ADMIN — SODIUM CHLORIDE, PRESERVATIVE FREE 10 ML: 5 INJECTION INTRAVENOUS at 08:15

## 2025-06-30 RX ADMIN — INSULIN GLARGINE 10 UNITS: 100 INJECTION, SOLUTION SUBCUTANEOUS at 09:14

## 2025-06-30 RX ADMIN — APIXABAN 5 MG: 5 TABLET, FILM COATED ORAL at 20:42

## 2025-06-30 RX ADMIN — INSULIN LISPRO 8 UNITS: 100 INJECTION, SOLUTION INTRAVENOUS; SUBCUTANEOUS at 17:19

## 2025-06-30 RX ADMIN — METRONIDAZOLE 500 MG: 500 INJECTION, SOLUTION INTRAVENOUS at 08:20

## 2025-06-30 RX ADMIN — INSULIN LISPRO 4 UNITS: 100 INJECTION, SOLUTION INTRAVENOUS; SUBCUTANEOUS at 09:14

## 2025-06-30 RX ADMIN — METRONIDAZOLE 500 MG: 500 INJECTION, SOLUTION INTRAVENOUS at 23:42

## 2025-06-30 ASSESSMENT — PAIN SCALES - GENERAL
PAINLEVEL_OUTOF10: 0
PAINLEVEL_OUTOF10: 6
PAINLEVEL_OUTOF10: 0
PAINLEVEL_OUTOF10: 0
PAINLEVEL_OUTOF10: 4
PAINLEVEL_OUTOF10: 4

## 2025-06-30 ASSESSMENT — PAIN SCALES - WONG BAKER
WONGBAKER_NUMERICALRESPONSE: NO HURT

## 2025-06-30 ASSESSMENT — PAIN DESCRIPTION - ORIENTATION: ORIENTATION: RIGHT;UPPER

## 2025-06-30 ASSESSMENT — PAIN DESCRIPTION - FREQUENCY
FREQUENCY: INTERMITTENT
FREQUENCY: INTERMITTENT

## 2025-06-30 ASSESSMENT — PAIN DESCRIPTION - ONSET
ONSET: SUDDEN
ONSET: SUDDEN

## 2025-06-30 ASSESSMENT — PAIN DESCRIPTION - PAIN TYPE
TYPE: ACUTE PAIN
TYPE: ACUTE PAIN

## 2025-06-30 ASSESSMENT — PAIN - FUNCTIONAL ASSESSMENT
PAIN_FUNCTIONAL_ASSESSMENT: ACTIVITIES ARE NOT PREVENTED
PAIN_FUNCTIONAL_ASSESSMENT: ACTIVITIES ARE NOT PREVENTED

## 2025-06-30 ASSESSMENT — PAIN DESCRIPTION - LOCATION
LOCATION: ABDOMEN
LOCATION: HEAD

## 2025-06-30 ASSESSMENT — PAIN DESCRIPTION - DESCRIPTORS
DESCRIPTORS: ACHING
DESCRIPTORS: ACHING

## 2025-06-30 NOTE — PLAN OF CARE
Pt A&O x4, assisted back to bed from chair, unsteady gait. VSS, SpO2 96% on RA. Peripheral IV's patent and NSL. Abdomen dressing c/d/i with drain to gravity. Reviewed with pt plan of care for shift and medications, all questions answered, pt voices no concerns.    Problem: Chronic Conditions and Co-morbidities  Goal: Patient's chronic conditions and co-morbidity symptoms are monitored and maintained or improved  6/29/2025 2223 by Dilia Acevedo RN  Outcome: Progressing  Flowsheets (Taken 6/29/2025 2145)  Care Plan - Patient's Chronic Conditions and Co-Morbidity Symptoms are Monitored and Maintained or Improved:   Monitor and assess patient's chronic conditions and comorbid symptoms for stability, deterioration, or improvement   Update acute care plan with appropriate goals if chronic or comorbid symptoms are exacerbated and prevent overall improvement and discharge  6/29/2025 1415 by Kari Browning RN  Outcome: Progressing  6/29/2025 1415 by Kari Browning RN  Outcome: Progressing     Problem: Discharge Planning  Goal: Discharge to home or other facility with appropriate resources  6/29/2025 2223 by Dilia Acevedo RN  Outcome: Progressing  Flowsheets (Taken 6/29/2025 2145)  Discharge to home or other facility with appropriate resources:   Identify barriers to discharge with patient and caregiver   Arrange for needed discharge resources and transportation as appropriate  6/29/2025 1415 by Kari Browning RN  Outcome: Progressing  6/29/2025 1415 by Kari Browning RN  Outcome: Progressing     Problem: Pain  Goal: Verbalizes/displays adequate comfort level or baseline comfort level  6/29/2025 2223 by Dilia Acevedo, RN  Outcome: Progressing  Flowsheets (Taken 6/29/2025 2145)  Verbalizes/displays adequate comfort level or baseline comfort level:   Encourage patient to monitor pain and request assistance   Assess pain using appropriate pain scale   Administer analgesics based on

## 2025-06-30 NOTE — CARE COORDINATION
Per IDR, potential need for IV abx at d/c. Call placed to [employer] to run benefits.    Treva Eddy RN

## 2025-07-01 ENCOUNTER — APPOINTMENT (OUTPATIENT)
Age: 80
DRG: 871 | End: 2025-07-01
Attending: PHYSICIAN ASSISTANT
Payer: MEDICARE

## 2025-07-01 PROBLEM — R79.82 CRP ELEVATED: Status: ACTIVE | Noted: 2025-07-01

## 2025-07-01 PROBLEM — R70.0 ELEVATED ERYTHROCYTE SEDIMENTATION RATE: Status: ACTIVE | Noted: 2025-07-01

## 2025-07-01 LAB
ACB COMPLEX DNA BLD POS QL NAA+NON-PROBE: NOT DETECTED
ALBUMIN SERPL-MCNC: 2.9 G/DL (ref 3.4–5)
ALBUMIN/GLOB SERPL: 1 {RATIO}
ALP SERPL-CCNC: 99 U/L (ref 40–129)
ALT SERPL-CCNC: 19 U/L (ref 10–40)
ANION GAP SERPL CALCULATED.3IONS-SCNC: 13 MMOL/L (ref 3–16)
AST SERPL-CCNC: 20 U/L (ref 15–37)
B FRAGILIS DNA BLD POS QL NAA+NON-PROBE: NOT DETECTED
BASOPHILS # BLD: 0.03 K/UL (ref 0–0.2)
BASOPHILS NFR BLD: 0 %
BILIRUB SERPL-MCNC: 0.4 MG/DL (ref 0–1)
BLACTX-M ISLT/SPM QL: NOT DETECTED
BLAIMP ISLT/SPM QL: NOT DETECTED
BLAKPC ISLT/SPM QL: NOT DETECTED
BLAOXA-48-LIKE ISLT/SPM QL: NOT DETECTED
BLAVIM ISLT/SPM QL: NOT DETECTED
BUN SERPL-MCNC: 15 MG/DL (ref 7–20)
C ALBICANS DNA BLD POS QL NAA+NON-PROBE: NOT DETECTED
C AURIS DNA BLD POS QL NAA+NON-PROBE: NOT DETECTED
C GATTII+NEOFOR DNA BLD POS QL NAA+N-PRB: NOT DETECTED
C GLABRATA DNA BLD POS QL NAA+NON-PROBE: NOT DETECTED
C KRUSEI DNA BLD POS QL NAA+NON-PROBE: NOT DETECTED
C PARAP DNA BLD POS QL NAA+NON-PROBE: NOT DETECTED
C TROPICLS DNA BLD POS QL NAA+NON-PROBE: NOT DETECTED
CALCIUM SERPL-MCNC: 8.8 MG/DL (ref 8.3–10.6)
CHLORIDE SERPL-SCNC: 102 MMOL/L (ref 99–110)
CO2 SERPL-SCNC: 22 MMOL/L (ref 21–32)
COLISTIN RES MCR-1 ISLT/SPM QL: NOT DETECTED
CREAT SERPL-MCNC: 0.6 MG/DL (ref 0.8–1.3)
E CLOAC COMP DNA BLD POS NAA+NON-PROBE: NOT DETECTED
E COLI DNA BLD POS QL NAA+NON-PROBE: NOT DETECTED
E FAECALIS DNA BLD POS QL NAA+NON-PROBE: NOT DETECTED
E FAECIUM DNA BLD POS QL NAA+NON-PROBE: NOT DETECTED
ENTEROBACTERALES DNA BLD POS NAA+N-PRB: DETECTED
EOSINOPHIL # BLD: 0.25 K/UL (ref 0–0.6)
EOSINOPHILS RELATIVE PERCENT: 3 %
ERYTHROCYTE [DISTWIDTH] IN BLOOD BY AUTOMATED COUNT: 12.5 % (ref 12.4–15.4)
GFR, ESTIMATED: >90 ML/MIN/1.73M2
GLUCOSE BLD-MCNC: 236 MG/DL (ref 70–99)
GLUCOSE BLD-MCNC: 237 MG/DL (ref 70–99)
GLUCOSE BLD-MCNC: 281 MG/DL (ref 70–99)
GLUCOSE BLD-MCNC: 326 MG/DL (ref 70–99)
GLUCOSE BLD-MCNC: 356 MG/DL (ref 70–99)
GLUCOSE SERPL-MCNC: 224 MG/DL (ref 70–99)
GP B STREP DNA BLD POS QL NAA+NON-PROBE: NOT DETECTED
HAEM INFLU DNA BLD POS QL NAA+NON-PROBE: NOT DETECTED
HCT VFR BLD AUTO: 37.5 % (ref 40.5–52.5)
HGB BLD-MCNC: 12.7 G/DL (ref 13.5–17.5)
IMM GRANULOCYTES # BLD AUTO: 0.14 K/UL (ref 0–0.5)
IMM GRANULOCYTES NFR BLD: 1 %
K OXYTOCA DNA BLD POS QL NAA+NON-PROBE: NOT DETECTED
KLEBSIELLA SP DNA BLD POS QL NAA+NON-PRB: DETECTED
KLEBSIELLA SP DNA BLD POS QL NAA+NON-PRB: NOT DETECTED
L MONOCYTOG DNA BLD POS QL NAA+NON-PROBE: NOT DETECTED
LYMPHOCYTES NFR BLD: 1.49 K/UL (ref 1–5.1)
LYMPHOCYTES RELATIVE PERCENT: 15 %
MCH RBC QN AUTO: 29.8 PG (ref 26–34)
MCHC RBC AUTO-ENTMCNC: 33.9 G/DL (ref 31–36)
MCV RBC AUTO: 88 FL (ref 80–100)
MICROORGANISM SPEC CULT: ABNORMAL
MICROORGANISM SPEC CULT: ABNORMAL
MICROORGANISM/AGENT SPEC: ABNORMAL
MICROORGANISM/AGENT SPEC: ABNORMAL
MONOCYTES NFR BLD: 0.84 K/UL (ref 0–1.3)
MONOCYTES NFR BLD: 9 %
N MEN DNA BLD POS QL NAA+NON-PROBE: NOT DETECTED
NEUTROPHILS NFR BLD: 72 %
NEUTS SEG NFR BLD: 7.05 K/UL (ref 1.7–7.7)
P AERUGINOSA DNA BLD POS NAA+NON-PROBE: NOT DETECTED
PLATELET # BLD AUTO: 377 K/UL (ref 135–450)
PMV BLD AUTO: 9 FL
POTASSIUM SERPL-SCNC: 3.7 MMOL/L (ref 3.5–5.1)
PROT SERPL-MCNC: 5.7 G/DL (ref 6.4–8.2)
PROTEUS SP DNA BLD POS QL NAA+NON-PROBE: NOT DETECTED
RBC # BLD AUTO: 4.26 M/UL (ref 4.2–5.9)
RESISTANT GENE NDM BY PCR: NOT DETECTED
S AUREUS DNA BLD POS QL NAA+NON-PROBE: NOT DETECTED
S AUREUS+CONS DNA BLD POS NAA+NON-PROBE: NOT DETECTED
S EPIDERMIDIS DNA BLD POS QL NAA+NON-PRB: NOT DETECTED
S LUGDUNENSIS DNA BLD POS QL NAA+NON-PRB: NOT DETECTED
S MALTOPHILIA DNA BLD POS QL NAA+NON-PRB: NOT DETECTED
S MARCESCENS DNA BLD POS NAA+NON-PROBE: NOT DETECTED
S PNEUM DNA BLD POS QL NAA+NON-PROBE: NOT DETECTED
S PYO DNA BLD POS QL NAA+NON-PROBE: NOT DETECTED
SALMONELLA DNA BLD POS QL NAA+NON-PROBE: NOT DETECTED
SERVICE CMNT-IMP: ABNORMAL
SODIUM SERPL-SCNC: 138 MMOL/L (ref 136–145)
SPECIMEN DESCRIPTION: ABNORMAL
STREPTOCOCCUS DNA BLD POS NAA+NON-PROBE: NOT DETECTED
WBC OTHER # BLD: 9.8 K/UL (ref 4–11)

## 2025-07-01 PROCEDURE — 99233 SBSQ HOSP IP/OBS HIGH 50: CPT | Performed by: INTERNAL MEDICINE

## 2025-07-01 PROCEDURE — 6360000004 HC RX CONTRAST MEDICATION: Performed by: SURGERY

## 2025-07-01 PROCEDURE — 2580000003 HC RX 258: Performed by: STUDENT IN AN ORGANIZED HEALTH CARE EDUCATION/TRAINING PROGRAM

## 2025-07-01 PROCEDURE — 74177 CT ABD & PELVIS W/CONTRAST: CPT

## 2025-07-01 PROCEDURE — 36415 COLL VENOUS BLD VENIPUNCTURE: CPT

## 2025-07-01 PROCEDURE — 97110 THERAPEUTIC EXERCISES: CPT

## 2025-07-01 PROCEDURE — 6370000000 HC RX 637 (ALT 250 FOR IP): Performed by: NURSE PRACTITIONER

## 2025-07-01 PROCEDURE — 6370000000 HC RX 637 (ALT 250 FOR IP): Performed by: STUDENT IN AN ORGANIZED HEALTH CARE EDUCATION/TRAINING PROGRAM

## 2025-07-01 PROCEDURE — 2060000000 HC ICU INTERMEDIATE R&B

## 2025-07-01 PROCEDURE — 82962 GLUCOSE BLOOD TEST: CPT

## 2025-07-01 PROCEDURE — 02HV33Z INSERTION OF INFUSION DEVICE INTO SUPERIOR VENA CAVA, PERCUTANEOUS APPROACH: ICD-10-PCS | Performed by: INTERNAL MEDICINE

## 2025-07-01 PROCEDURE — 2500000003 HC RX 250 WO HCPCS: Performed by: STUDENT IN AN ORGANIZED HEALTH CARE EDUCATION/TRAINING PROGRAM

## 2025-07-01 PROCEDURE — 6360000002 HC RX W HCPCS: Performed by: STUDENT IN AN ORGANIZED HEALTH CARE EDUCATION/TRAINING PROGRAM

## 2025-07-01 PROCEDURE — 99232 SBSQ HOSP IP/OBS MODERATE 35: CPT | Performed by: SURGERY

## 2025-07-01 PROCEDURE — 97535 SELF CARE MNGMENT TRAINING: CPT

## 2025-07-01 PROCEDURE — 2500000003 HC RX 250 WO HCPCS: Performed by: INTERNAL MEDICINE

## 2025-07-01 PROCEDURE — 85025 COMPLETE CBC W/AUTO DIFF WBC: CPT

## 2025-07-01 PROCEDURE — G0545 PR INHERENT VISIT TO INPT: HCPCS | Performed by: INTERNAL MEDICINE

## 2025-07-01 PROCEDURE — 97530 THERAPEUTIC ACTIVITIES: CPT

## 2025-07-01 PROCEDURE — 80053 COMPREHEN METABOLIC PANEL: CPT

## 2025-07-01 RX ORDER — LACTOBACILLUS RHAMNOSUS GG 10B CELL
1 CAPSULE ORAL
Status: DISCONTINUED | OUTPATIENT
Start: 2025-07-01 | End: 2025-07-02 | Stop reason: HOSPADM

## 2025-07-01 RX ORDER — METOPROLOL TARTRATE 1 MG/ML
5 INJECTION, SOLUTION INTRAVENOUS EVERY 6 HOURS PRN
Status: DISCONTINUED | OUTPATIENT
Start: 2025-07-01 | End: 2025-07-02 | Stop reason: HOSPADM

## 2025-07-01 RX ORDER — ACETAMINOPHEN 325 MG/1
650 TABLET ORAL EVERY 6 HOURS PRN
Status: DISCONTINUED | OUTPATIENT
Start: 2025-07-01 | End: 2025-07-02 | Stop reason: HOSPADM

## 2025-07-01 RX ORDER — SOTALOL HYDROCHLORIDE 80 MG/1
80 TABLET ORAL EVERY 12 HOURS
Status: DISCONTINUED | OUTPATIENT
Start: 2025-07-01 | End: 2025-07-01

## 2025-07-01 RX ORDER — LIDOCAINE HYDROCHLORIDE 10 MG/ML
50 INJECTION, SOLUTION EPIDURAL; INFILTRATION; INTRACAUDAL; PERINEURAL ONCE
Status: DISCONTINUED | OUTPATIENT
Start: 2025-07-01 | End: 2025-07-02 | Stop reason: HOSPADM

## 2025-07-01 RX ORDER — SODIUM CHLORIDE 0.9 % (FLUSH) 0.9 %
5-40 SYRINGE (ML) INJECTION EVERY 12 HOURS SCHEDULED
Status: DISCONTINUED | OUTPATIENT
Start: 2025-07-01 | End: 2025-07-02 | Stop reason: HOSPADM

## 2025-07-01 RX ORDER — IOPAMIDOL 755 MG/ML
75 INJECTION, SOLUTION INTRAVASCULAR
Status: COMPLETED | OUTPATIENT
Start: 2025-07-01 | End: 2025-07-01

## 2025-07-01 RX ORDER — SODIUM CHLORIDE 9 MG/ML
INJECTION, SOLUTION INTRAVENOUS PRN
Status: DISCONTINUED | OUTPATIENT
Start: 2025-07-01 | End: 2025-07-02 | Stop reason: HOSPADM

## 2025-07-01 RX ORDER — SODIUM CHLORIDE 0.9 % (FLUSH) 0.9 %
5-40 SYRINGE (ML) INJECTION PRN
Status: DISCONTINUED | OUTPATIENT
Start: 2025-07-01 | End: 2025-07-02 | Stop reason: HOSPADM

## 2025-07-01 RX ADMIN — Medication 10 ML: at 20:50

## 2025-07-01 RX ADMIN — INSULIN LISPRO 8 UNITS: 100 INJECTION, SOLUTION INTRAVENOUS; SUBCUTANEOUS at 20:47

## 2025-07-01 RX ADMIN — METRONIDAZOLE 500 MG: 500 INJECTION, SOLUTION INTRAVENOUS at 23:57

## 2025-07-01 RX ADMIN — IOPAMIDOL 75 ML: 755 INJECTION, SOLUTION INTRAVENOUS at 09:13

## 2025-07-01 RX ADMIN — METRONIDAZOLE 500 MG: 500 INJECTION, SOLUTION INTRAVENOUS at 07:54

## 2025-07-01 RX ADMIN — METRONIDAZOLE 500 MG: 500 INJECTION, SOLUTION INTRAVENOUS at 16:31

## 2025-07-01 RX ADMIN — INSULIN LISPRO 4 UNITS: 100 INJECTION, SOLUTION INTRAVENOUS; SUBCUTANEOUS at 18:46

## 2025-07-01 RX ADMIN — POLYETHYLENE GLYCOL 3350 17 G: 17 POWDER, FOR SOLUTION ORAL at 07:55

## 2025-07-01 RX ADMIN — APIXABAN 5 MG: 5 TABLET, FILM COATED ORAL at 20:48

## 2025-07-01 RX ADMIN — INSULIN LISPRO 16 UNITS: 100 INJECTION, SOLUTION INTRAVENOUS; SUBCUTANEOUS at 12:34

## 2025-07-01 RX ADMIN — MONTELUKAST 10 MG: 10 TABLET, FILM COATED ORAL at 20:48

## 2025-07-01 RX ADMIN — Medication 1 CAPSULE: at 17:02

## 2025-07-01 RX ADMIN — INSULIN LISPRO 4 UNITS: 100 INJECTION, SOLUTION INTRAVENOUS; SUBCUTANEOUS at 08:23

## 2025-07-01 RX ADMIN — CEFTRIAXONE SODIUM 2000 MG: 2 INJECTION, POWDER, FOR SOLUTION INTRAMUSCULAR; INTRAVENOUS at 23:57

## 2025-07-01 RX ADMIN — SODIUM CHLORIDE, PRESERVATIVE FREE 10 ML: 5 INJECTION INTRAVENOUS at 07:54

## 2025-07-01 RX ADMIN — APIXABAN 5 MG: 5 TABLET, FILM COATED ORAL at 07:55

## 2025-07-01 RX ADMIN — INSULIN GLARGINE 10 UNITS: 100 INJECTION, SOLUTION SUBCUTANEOUS at 07:54

## 2025-07-01 RX ADMIN — ATORVASTATIN CALCIUM 20 MG: 20 TABLET, FILM COATED ORAL at 17:02

## 2025-07-01 ASSESSMENT — PAIN SCALES - GENERAL
PAINLEVEL_OUTOF10: 0

## 2025-07-01 ASSESSMENT — PAIN SCALES - WONG BAKER
WONGBAKER_NUMERICALRESPONSE: NO HURT
WONGBAKER_NUMERICALRESPONSE: NO HURT

## 2025-07-01 NOTE — PLAN OF CARE
Problem: Chronic Conditions and Co-morbidities  Goal: Patient's chronic conditions and co-morbidity symptoms are monitored and maintained or improved  6/30/2025 2203 by Nicole Lowery RN  Outcome: Progressing  Flowsheets (Taken 6/30/2025 2203)  Care Plan - Patient's Chronic Conditions and Co-Morbidity Symptoms are Monitored and Maintained or Improved:   Monitor and assess patient's chronic conditions and comorbid symptoms for stability, deterioration, or improvement   Collaborate with multidisciplinary team to address chronic and comorbid conditions and prevent exacerbation or deterioration   Update acute care plan with appropriate goals if chronic or comorbid symptoms are exacerbated and prevent overall improvement and discharge  6/30/2025 1228 by Kari Browning RN  Outcome: Progressing     Problem: Discharge Planning  Goal: Discharge to home or other facility with appropriate resources  6/30/2025 2203 by Nicole Lowery RN  Outcome: Progressing  Flowsheets (Taken 6/30/2025 2203)  Discharge to home or other facility with appropriate resources:   Identify barriers to discharge with patient and caregiver   Identify discharge learning needs (meds, wound care, etc)   Arrange for needed discharge resources and transportation as appropriate  6/30/2025 1228 by Kari Browning RN  Outcome: Progressing     Problem: Pain  Goal: Verbalizes/displays adequate comfort level or baseline comfort level  6/30/2025 2203 by Nicole Lowery RN  Outcome: Progressing  Flowsheets (Taken 6/30/2025 2203)  Verbalizes/displays adequate comfort level or baseline comfort level:   Encourage patient to monitor pain and request assistance   Administer analgesics based on type and severity of pain and evaluate response   Consider cultural and social influences on pain and pain management   Implement non-pharmacological measures as appropriate and evaluate response   Assess pain using appropriate pain scale   Notify Licensed Independent

## 2025-07-01 NOTE — DISCHARGE INSTR - COC
Continuity of Care Form    Patient Name: Osmar Mckenna   :  1945  MRN:  [id number]    Admit date:  2025  Discharge date:  ***    Code Status Order: Full Code   Advance Directives:     Admitting Physician:  Florentin Bone DO  PCP: Masoud Garcia MD    Discharging Nurse: ***  Discharging Hospital Unit/Room#: 3206/3206-01  Discharging Unit Phone Number: ***    Emergency Contact:   Extended Emergency Contact Information  Primary Emergency Contact: CAITY PARIS  Home Phone: [phone]  Mobile Phone: [phone]  Relation: Girlfriend  Preferred language: English   needed? No  Secondary Emergency Contact: Filomena Dick  Mobile Phone: [phone]  Relation: Grandchild  Preferred language: English   needed? No    Past Surgical History:  Past Surgical History:   Procedure Laterality Date    CARDIAC PROCEDURE Left 2025    Left heart cath / coronary angiography performed by Sebastian Alanis MD at [facility] CARDIAC CATH LAB    CT ABSCESS DRAINAGE SOFT TISSUE  2025    CT ABSCESS DRAINAGE SOFT TISSUE 2025 [facility] CT SCAN    PACEMAKER PLACEMENT         Immunization History:     There is no immunization history on file for this patient.    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

## 2025-07-01 NOTE — CARE COORDINATION
$8/wk estimated drug cost for Ceftriaxone and $6/wk for Metronidazole $6/wk, per [facility].    Treva Eddy RN

## 2025-07-02 ENCOUNTER — TELEPHONE (OUTPATIENT)
Age: 80
End: 2025-07-02

## 2025-07-02 VITALS
WEIGHT: 184.3 LBS | SYSTOLIC BLOOD PRESSURE: 130 MMHG | DIASTOLIC BLOOD PRESSURE: 72 MMHG | OXYGEN SATURATION: 97 % | BODY MASS INDEX: 31.47 KG/M2 | HEART RATE: 90 BPM | RESPIRATION RATE: 17 BRPM | TEMPERATURE: 98 F | HEIGHT: 64 IN

## 2025-07-02 LAB
ALBUMIN SERPL-MCNC: 2.7 G/DL (ref 3.4–5)
ALBUMIN/GLOB SERPL: 0.9 {RATIO}
ALP SERPL-CCNC: 99 U/L (ref 40–129)
ALT SERPL-CCNC: 19 U/L (ref 10–40)
ANION GAP SERPL CALCULATED.3IONS-SCNC: 13 MMOL/L (ref 3–16)
AST SERPL-CCNC: 22 U/L (ref 15–37)
BASOPHILS # BLD: 0.02 K/UL (ref 0–0.2)
BASOPHILS NFR BLD: 0 %
BILIRUB SERPL-MCNC: 0.4 MG/DL (ref 0–1)
BUN SERPL-MCNC: 13 MG/DL (ref 7–20)
CALCIUM SERPL-MCNC: 8.6 MG/DL (ref 8.3–10.6)
CHLORIDE SERPL-SCNC: 101 MMOL/L (ref 99–110)
CO2 SERPL-SCNC: 25 MMOL/L (ref 21–32)
CREAT SERPL-MCNC: 0.7 MG/DL (ref 0.8–1.3)
EOSINOPHIL # BLD: 0.27 K/UL (ref 0–0.6)
EOSINOPHILS RELATIVE PERCENT: 3 %
ERYTHROCYTE [DISTWIDTH] IN BLOOD BY AUTOMATED COUNT: 12.7 % (ref 12.4–15.4)
GFR, ESTIMATED: >90 ML/MIN/1.73M2
GLUCOSE BLD-MCNC: 211 MG/DL (ref 70–99)
GLUCOSE BLD-MCNC: 251 MG/DL (ref 70–99)
GLUCOSE BLD-MCNC: 312 MG/DL (ref 70–99)
GLUCOSE SERPL-MCNC: 206 MG/DL (ref 70–99)
HCT VFR BLD AUTO: 36.3 % (ref 40.5–52.5)
HGB BLD-MCNC: 12.1 G/DL (ref 13.5–17.5)
IMM GRANULOCYTES # BLD AUTO: 0.24 K/UL (ref 0–0.5)
IMM GRANULOCYTES NFR BLD: 3 %
LYMPHOCYTES NFR BLD: 1.3 K/UL (ref 1–5.1)
LYMPHOCYTES RELATIVE PERCENT: 15 %
MAGNESIUM SERPL-MCNC: 1.6 MG/DL (ref 1.8–2.4)
MCH RBC QN AUTO: 29.4 PG (ref 26–34)
MCHC RBC AUTO-ENTMCNC: 33.3 G/DL (ref 31–36)
MCV RBC AUTO: 88.3 FL (ref 80–100)
MONOCYTES NFR BLD: 0.65 K/UL (ref 0–1.3)
MONOCYTES NFR BLD: 8 %
NEUTROPHILS NFR BLD: 71 %
NEUTS SEG NFR BLD: 6.06 K/UL (ref 1.7–7.7)
PLATELET # BLD AUTO: 393 K/UL (ref 135–450)
PMV BLD AUTO: 8.7 FL (ref 9.4–12.4)
POTASSIUM SERPL-SCNC: 3.4 MMOL/L (ref 3.5–5.1)
PROT SERPL-MCNC: 5.6 G/DL (ref 6.4–8.2)
RBC # BLD AUTO: 4.11 M/UL (ref 4.2–5.9)
SODIUM SERPL-SCNC: 139 MMOL/L (ref 136–145)
WBC OTHER # BLD: 8.5 K/UL (ref 4–11)

## 2025-07-02 PROCEDURE — 99222 1ST HOSP IP/OBS MODERATE 55: CPT | Performed by: INTERNAL MEDICINE

## 2025-07-02 PROCEDURE — 6370000000 HC RX 637 (ALT 250 FOR IP): Performed by: STUDENT IN AN ORGANIZED HEALTH CARE EDUCATION/TRAINING PROGRAM

## 2025-07-02 PROCEDURE — 99232 SBSQ HOSP IP/OBS MODERATE 35: CPT | Performed by: SURGERY

## 2025-07-02 PROCEDURE — 6370000000 HC RX 637 (ALT 250 FOR IP): Performed by: NURSE PRACTITIONER

## 2025-07-02 PROCEDURE — 83735 ASSAY OF MAGNESIUM: CPT

## 2025-07-02 PROCEDURE — 2500000003 HC RX 250 WO HCPCS: Performed by: INTERNAL MEDICINE

## 2025-07-02 PROCEDURE — 80053 COMPREHEN METABOLIC PANEL: CPT

## 2025-07-02 PROCEDURE — 82962 GLUCOSE BLOOD TEST: CPT

## 2025-07-02 PROCEDURE — 85025 COMPLETE CBC W/AUTO DIFF WBC: CPT

## 2025-07-02 PROCEDURE — 6360000002 HC RX W HCPCS: Performed by: STUDENT IN AN ORGANIZED HEALTH CARE EDUCATION/TRAINING PROGRAM

## 2025-07-02 PROCEDURE — 6370000000 HC RX 637 (ALT 250 FOR IP): Performed by: INTERNAL MEDICINE

## 2025-07-02 PROCEDURE — 2500000003 HC RX 250 WO HCPCS: Performed by: STUDENT IN AN ORGANIZED HEALTH CARE EDUCATION/TRAINING PROGRAM

## 2025-07-02 RX ORDER — SOTALOL HYDROCHLORIDE 80 MG/1
80 TABLET ORAL 2 TIMES DAILY
Qty: 60 TABLET | Refills: 1 | Status: SHIPPED | OUTPATIENT
Start: 2025-07-02 | End: 2025-08-31

## 2025-07-02 RX ORDER — SOTALOL HYDROCHLORIDE 80 MG/1
80 TABLET ORAL EVERY 12 HOURS
Status: DISCONTINUED | OUTPATIENT
Start: 2025-07-02 | End: 2025-07-02 | Stop reason: HOSPADM

## 2025-07-02 RX ORDER — LACTOBACILLUS RHAMNOSUS GG 10B CELL
1 CAPSULE ORAL
Qty: 8 CAPSULE | Refills: 0 | Status: SHIPPED | OUTPATIENT
Start: 2025-07-03 | End: 2025-07-11

## 2025-07-02 RX ADMIN — INSULIN LISPRO 8 UNITS: 100 INJECTION, SOLUTION INTRAVENOUS; SUBCUTANEOUS at 16:49

## 2025-07-02 RX ADMIN — POTASSIUM CHLORIDE 40 MEQ: 1500 TABLET, EXTENDED RELEASE ORAL at 06:24

## 2025-07-02 RX ADMIN — MAGNESIUM SULFATE HEPTAHYDRATE 2000 MG: 40 INJECTION, SOLUTION INTRAVENOUS at 10:47

## 2025-07-02 RX ADMIN — INSULIN LISPRO 12 UNITS: 100 INJECTION, SOLUTION INTRAVENOUS; SUBCUTANEOUS at 10:41

## 2025-07-02 RX ADMIN — APIXABAN 5 MG: 5 TABLET, FILM COATED ORAL at 08:27

## 2025-07-02 RX ADMIN — Medication 1 CAPSULE: at 08:27

## 2025-07-02 RX ADMIN — SOTALOL HYDROCHLORIDE 80 MG: 80 TABLET ORAL at 10:41

## 2025-07-02 RX ADMIN — METRONIDAZOLE 500 MG: 500 INJECTION, SOLUTION INTRAVENOUS at 08:31

## 2025-07-02 RX ADMIN — Medication 10 ML: at 08:35

## 2025-07-02 RX ADMIN — METRONIDAZOLE 500 MG: 500 INJECTION, SOLUTION INTRAVENOUS at 16:41

## 2025-07-02 RX ADMIN — ATORVASTATIN CALCIUM 20 MG: 20 TABLET, FILM COATED ORAL at 16:49

## 2025-07-02 RX ADMIN — INSULIN GLARGINE 10 UNITS: 100 INJECTION, SOLUTION SUBCUTANEOUS at 08:27

## 2025-07-02 RX ADMIN — SODIUM CHLORIDE, PRESERVATIVE FREE 10 ML: 5 INJECTION INTRAVENOUS at 08:35

## 2025-07-02 RX ADMIN — INSULIN LISPRO 4 UNITS: 100 INJECTION, SOLUTION INTRAVENOUS; SUBCUTANEOUS at 08:27

## 2025-07-02 ASSESSMENT — PAIN SCALES - GENERAL: PAINLEVEL_OUTOF10: 0

## 2025-07-02 NOTE — CONSULTS
General Surgery Consult Note      Osmar Mckenna   : 1945 MRN: [id number]  Date of Admission: 2025  Admitting Physician:Florentin Bone DO  Primary Care Physician: Masoud Garcia MD  Consulting Physician: Dr. Alvarez    Reason for Consult: liver lesion    Chief complaint:    Chief Complaint   Patient presents with    Sweats     Pt presents to the ED with complaints of excessive sweating. Pt had a heart cath and was discharged yesterday dizzy. Pts wife states he was hot to the touch last night. Pts wife states that the dizziness and slurred speech started today round 1500. BGL was 301 at home. Pt now complaining of chest pain.     Dizziness       Diagnosis Present on Admission:   Liver lesion      History of Present Illness  Osmar Mckenna is a 80 y.o. male admitted on 2025 for diffuse abdominal pain and liver lesion. Had CT and then MRI. Now have catheter in presumed liver abscess.    Past Medical History:   Diagnosis Date    Diabetes mellitus (HCC)     Hypertension      Past Surgical History:   Procedure Laterality Date    CARDIAC PROCEDURE Left 2025    Left heart cath / coronary angiography performed by Sebastian Alanis MD at [facility] CARDIAC CATH LAB    CT ABSCESS DRAINAGE SOFT TISSUE  2025    CT ABSCESS DRAINAGE SOFT TISSUE 2025 [facility] CT SCAN    PACEMAKER PLACEMENT       No family history on file.  Social History     Socioeconomic History    Marital status: Single     Spouse name: Not on file    Number of children: Not on file    Years of education: Not on file    Highest education level: Not on file   Occupational History    Not on file   Tobacco Use    Smoking status: Former     Average packs/day: 0.5 packs/day for 27.0 years (13.5 ttl pk-yrs)     Types: Cigarettes     Start date:      Passive exposure: Never    Smokeless tobacco: Never   Vaping Use    Vaping status: Never Used   Substance and Sexual Activity    Alcohol use: Not Currently    Drug use: Not 
      CARDIOLOGY CONSULTATION        Patient Name: Osmar Mckenna  Date of admission: 6/25/2025  5:46 PM  Admission Dx: Lightheadedness [R42]  Slurred speech [R47.81]  Liver mass [R16.0]  Flank pain [R10.9]  Liver lesion [K76.9]  Near syncope [R55]  Requesting Physician: Florentin Bone DO  Primary Care physician: Masoud Garcia MD    Reason for Consultation/Chief Complaint: chest pain    History of Present Illness:     Osmar Mckenna is a 80 y.o. patient with Pmh of  hypertension, HFpEF, paroxysmal atrial fibrillation on Eliquis, pacemaker in place, asthma,, type II DM who presented with generalized fatigue and abdominal pain. CT of the abdomen done in ER showed a 5.1 x 3.5 cm hypodense lesion in the liver. He was admitted for further workup and treatment.     Patient was admitted 6/21/25 for chest pain. Cath in '23 showed moderate narrowing of a large LAD. During that admission, he had an abnormal stress test and was taken to the cath lab with normal findings. Management for Afib tx with Eliquis and he was to continue sotalol which he apparently did not according to notes this admission. He follows with Cardiology at [facility] as outpatient.     He is up in his chair, denies any pain or shortness of breath at this time. Wife at bedside.       Past Medical History:   has a past medical history of Diabetes mellitus (HCC) and Hypertension.    Surgical History:   has a past surgical history that includes pacemaker placement; Cardiac procedure (Left, 6/23/2025); and CT ABSCESS DRAINAGE SOFT TISSUE (6/28/2025).     Social History:   reports that he has quit smoking. His smoking use included cigarettes. He started smoking about 61 years ago. He has a 13.5 pack-year smoking history. He has never been exposed to tobacco smoke. He has never used smokeless tobacco. He reports that he does not currently use alcohol. He reports that he does not currently use drugs.     Family History:  family history is not on 
Sedation Pre-Procedure Note    Patient Name: Osmar Mckenna   YOB: 1945  Room/Bed: 3206/3206-01  Medical Record Number: [id number]  Date: 6/28/2025   Time: 10:11 AM       Indication:  Liver abscess    Consent: I have discussed with the patient and/or the patient representative the indication, alternatives, and the possible risks and/or complications of the planned procedure and the anesthesia methods. The patient and/or patient representative appear to understand and agree to proceed.    Vital Signs:   Vitals:    06/28/25 0854   BP: (!) 153/87   Pulse: 100   Resp: 18   Temp: 99.7 °F (37.6 °C)   SpO2: 94%       Past Medical History:   has a past medical history of Diabetes mellitus (HCC) and Hypertension.    Past Surgical History:   has a past surgical history that includes pacemaker placement and Cardiac procedure (Left, 6/23/2025).    Medications:   Scheduled Meds:    insulin lispro  0-8 Units SubCUTAneous 4x Daily AC & HS    sodium chloride flush  5-40 mL IntraVENous 2 times per day    [Held by provider] enoxaparin  40 mg SubCUTAneous Daily    [Held by provider] apixaban  5 mg Oral BID    [Held by provider] atorvastatin  20 mg Oral QPM    [Held by provider] lisinopril  10 mg Oral Daily    montelukast  10 mg Oral QHS    [Held by provider] NIFEdipine  30 mg Oral Daily    cefTRIAXone (ROCEPHIN) IV  2,000 mg IntraVENous Q24H    metroNIDAZOLE  500 mg IntraVENous Q8H     Continuous Infusions:    sodium chloride      dextrose       PRN Meds: acetaminophen, sodium chloride flush, sodium chloride, potassium chloride **OR** potassium alternative oral replacement **OR** potassium chloride, magnesium sulfate, polyethylene glycol, acetaminophen **OR** acetaminophen, glucose, dextrose bolus **OR** dextrose bolus, glucagon (rDNA), dextrose, prochlorperazine **OR** prochlorperazine, oxyCODONE  Home Meds:   Prior to Admission medications    Medication Sig Start Date End Date Taking? Authorizing Provider   apixaban 
Updated: 06/29/25 9157    Specimen Description .BODY FLUID    Direct Exam Cytospin smear     Polymorphonuclear leukocytes PRESENT     RBC'S PRESENT     NO EPITHELIAL CELLS     NO ORGANISMS SEEN    Culture KLEBSIELLA PNEUMONIAE LIGHT GROWTH Susceptibility to follow. Abnormal    Culture, Blood 1 [[id number]] Collected: 06/25/25 2244   Order Status: Completed Specimen: Blood Updated: 06/29/25 1304    Specimen Description .BLOOD .ARM    Special Requests         Culture NO GROWTH 3 DAYS   Culture, Blood 2 [[id number]] (Abnormal) Collected: 06/25/25 2244   Order Status: Completed Specimen: Blood Updated: 06/29/25 0515    Specimen Description .BLOOD .HAND LEFT    Special Requests         Culture POSITIVE Blood Culture Abnormal     Candida auris Not Detected    Candida albicans Not Detected    Candida glabrata Not Detected    Candida krusei Not Detected    Candida parapsilosis Not Detected    Candida tropicalis Not Detected    Cryptococcus neoformans/gattii Not Detected    Acinetobacter calcoaceticus-baumannii complex Not Detected    Escherichia Coli Not Detected    Enterobacter Cloacae Complex Not Detected    Enterobacterales DETECTED Abnormal     Klebsiella oxytoca Not Detected    Klebsiella aerogenes Not Detected    Klebsiella pneumoniae group DETECTED Abnormal      Blood Culture: No results found for: \"BC\", \"BLOODCULT2\"    Viral Culture:    Lab Results   Component Value Date/Time    COVID19 Not Detected 06/25/2025 06:44 PM     Urine Culture: No results for input(s): \"LABURIN\" in the last 72 hours.    Scheduled Meds:   insulin lispro  0-16 Units SubCUTAneous 4x Daily AC & HS    sodium chloride flush  5-40 mL IntraVENous 2 times per day    [Held by provider] enoxaparin  40 mg SubCUTAneous Daily    [Held by provider] apixaban  5 mg Oral BID    [Held by provider] atorvastatin  20 mg Oral QPM    [Held by provider] lisinopril  10 mg Oral Daily    montelukast  10 mg Oral QHS    [Held by provider] NIFEdipine  30 mg Oral Daily 
Name band;

## 2025-07-02 NOTE — PLAN OF CARE
Problem: Chronic Conditions and Co-morbidities  Goal: Patient's chronic conditions and co-morbidity symptoms are monitored and maintained or improved  7/2/2025 0848 by Louisa Sellers, RN  Outcome: Progressing  Flowsheets (Taken 7/2/2025 0800)  Care Plan - Patient's Chronic Conditions and Co-Morbidity Symptoms are Monitored and Maintained or Improved:   Monitor and assess patient's chronic conditions and comorbid symptoms for stability, deterioration, or improvement   Collaborate with multidisciplinary team to address chronic and comorbid conditions and prevent exacerbation or deterioration   Update acute care plan with appropriate goals if chronic or comorbid symptoms are exacerbated and prevent overall improvement and discharge  7/1/2025 2109 by Nicole Lowery RN  Outcome: Progressing  Flowsheets (Taken 7/1/2025 2109)  Care Plan - Patient's Chronic Conditions and Co-Morbidity Symptoms are Monitored and Maintained or Improved:   Monitor and assess patient's chronic conditions and comorbid symptoms for stability, deterioration, or improvement   Collaborate with multidisciplinary team to address chronic and comorbid conditions and prevent exacerbation or deterioration   Update acute care plan with appropriate goals if chronic or comorbid symptoms are exacerbated and prevent overall improvement and discharge     Problem: Discharge Planning  Goal: Discharge to home or other facility with appropriate resources  7/2/2025 0848 by Louisa Sellers, RN  Outcome: Progressing  Flowsheets (Taken 7/2/2025 0800)  Discharge to home or other facility with appropriate resources:   Identify barriers to discharge with patient and caregiver   Arrange for needed discharge resources and transportation as appropriate   Identify discharge learning needs (meds, wound care, etc)   Arrange for interpreters to assist at discharge as needed   Refer to discharge planning if patient needs post-hospital services based on physician order or

## 2025-07-02 NOTE — PROGRESS NOTES
Started researching patient @ 10:00am on 6/26/2025    Went to  website to look up current implant information.  Patient currently has [employer] Accolade Model L311. RA Lead: 7840 RV Lead: [id number].     Patient has MRI conditional pacemaker.  Full body scan 1.5T, First level controlled operating mode (LUIS M 4W/Kg) - No MR exclusion zones, no height restriction. Pt's pacemaker was placed in MRI mode and scanned on 6/27/2025 with [employer] scientific rep present. Required extensive research and multiple calls to cardiology and [employer]    Research finished @ 6/26/2025 11:45am     Start Research:                       10:00am        Finished Research:                 11:45am  Total Research Time:               1hr 45 minutes total              
      Hospital Medicine Progress Note  V 5.17      Date of Admission: 6/25/2025    Hospital Day: 2      Chief Admission Complaint:    Generalized fatigue, symptoms of abdominal pain    80 yold male with medical hx of PAF, DM, HTN and chronic Heat failure.   Symptoms of fatigued and weak for 2 weeks.   Pain in abdomen since 1 month, diffuse. Reduced appetite nausea with eating  No vomiting, dry cough for several days.  BM few hours ago, brown no melena or hematochezia  Generalized weakness, last fall x 1 month ago. Tripped and fell.  Lives at home with girl friend.    Subjective:      Feels week. has little appetite.  Fever with chills, dry cough x 2 days  Abdominal pain, in lower abdomen. No nausea.   No acute chest pain  Normal Bowel movement    Presenting Admission History:       80 yold male with medical hx of PAF, DM, HTN and chronic Heat failure.   Symptoms of fatigued and weak for 2 weeks.   Pain in abdomen since 1 month, diffuse.  He complains about reduced appetite nausea with eating  No vomiting, dry cough for several days.  BM few hours ago, brown no melena or hematochezia  Generalized weakness, last fall x 1 month ago. Tripped and fell.  Lives at home with girl friend.    Assessment/Plan:      Generalized weakness  Diffuse abdominal pain  Imaging study revealed  There is a 5.1 x 3.5 cm hypodense lesion in the anterior aspect of the liver  Plan is to obtain MRI abdomen  Differentials include encephalopathy versus malignancy.  Rule out abscess versus mass versus cyst.  Continue with IV antibiotics.    [employer] pacemaker  Team notified  Needs to be turned off to receive MRI    Hepatic lesions  MRI liver  IV abx on Rocephin and Flagyl - hepatic abscess vs. Infection      Leukocytosis -admit WBC 15  Lactic  Blood cultures  Chest xray  Severe sepsis    Hypotensive on initial evaluaiton - BP is improved current pressure of   Hold of on Nifedipine and lisionpril today  Resume tomorrow if BP stays 
      Hospital Medicine Progress Note  V 5.17      Date of Admission: 6/25/2025    Hospital Day: 3      Chief Admission Complaint:    Generalized fatigue, symptoms of abdominal pain     Subjective:      Before admission few night were rough due to abdominal pain, last night was better  Lack of appetite due to nausea  No vomiting yesterday  Chronic diarrhea  No fever last night    Presenting Admission History:       80 yold male with medical hx of PAF, DM, HTN and CHF.   Symptoms of fatigued and weak for 2 weeks.   Pain in abdomen since 1 month, diffuse.  He complains about reduced appetite nausea with eating  No vomiting, dry cough for several days.  BM few hours ago, brown no melena or hematochezia  Generalized weakness, last fall x 1 month ago. Tripped and fell.  Intermittent low-grade fever without chills  Lives at home with girl friend.    Assessment/Plan:        -MRI abdomen  -5.7 cm complex cystic appearing peripherally enhancing lesion in anterior left hepatic lobe.  This lesion is new since 20 5/2025.  Differentials include hepatic abscess, favored etiology.  Follow-up to assess for improvement and resolution to exclude neoplastic etiology recommended.  -Contacted surgery Dr. Heck, Surgical consult   -Plan for patient to receive surgical consult tomorrow  - CT-guided IR drain recommended for concerns of hepatic abscess.     Generalized weakness  Diffuse abdominal pain, nausea and lack of appetite  Ct abdomen 5.1 x 3.5 cm hypodense lesion in the anterior aspect of the liver  Differentials include encephalopathy versus malignancy.  Rule out abscess versus mass versus cyst.  Continue with IV antibiotics.    Hepatic lesions  MRI liver - obtained today  IV abx on Rocephin and Flagyl - hepatic abscess vs. Infection    Leukocytosis -  On admission WBC 15 - today WBC 11.2  Lactic on 06/26 2.0  Blood cultures - in process  CT scan - liver lesions  Severe sepsis    HTN -   BP is improved current pressure of   Mildly 
      Hospitalist Progress Note      Name:  Osmar Mckenna /Age/Sex: 1945  (80 y.o. male)   MRN & CSN:  [id number] & [id number] Encounter Date/Time: 2025 8:12 AM EDT    Location:  3206/3206-01 PCP: Masoud Garcia MD       Hospital Day: 8         Date of Admission: 2025    Chief Complaint:    Chief Complaint   Patient presents with    Sweats     Pt presents to the ED with complaints of excessive sweating. Pt had a heart cath and was discharged yesterday dizzy. Pts wife states he was hot to the touch last night. Pts wife states that the dizziness and slurred speech started today round 1500. BGL was 301 at home. Pt now complaining of chest pain.     Dizziness        Hospital Course: Osmar Mckenna is a 80 y.o. male with a pmh of hypertension, HFpEF, paroxysmal atrial fibrillation on Eliquis, pacemaker in place, asthma,, type II DM who presented with generalized fatigue and abdominal pain. CT of the abdomen done in ER showed a 5.1 x 3.5 cm hypodense lesion in the liver. He was admitted for further workup and treatment.   Patient was followed by general surgery throughout his hospitalization.    Subjective: Patient seen and examined at bedside this morning he is up in room sitting in chair.  Patient girlfriend is at bedside.  Did discuss with patient the CT abdomen pelvis findings as they reviewed them themselves on MyChart.  They show worsening of abscess.  As far as future planning that is to be discussed with general surgery and your plan.  Patient denies any abdominal pain no nausea, vomiting.  Patient was seen by infectious disease last evening and will be discharged home on IV antibiotics and he is aware.    As far as intervention concerned and padded abscess that would be discussion to have with general surgery when they see him today.      Assessment and Recommendations:    Hepatic abscess  - MRI abdomen showed 5.7 cm complex cystic-appearing peripherally enhancing lesion in the 
      Hospitalist Progress Note      Name:  Osmar Mckenna /Age/Sex: 1945  (80 y.o. male)   MRN & CSN:  [id number] & [id number] Encounter Date/Time: 2025 8:12 AM EDT    Location:  3206/3206-01 PCP: Masoud Garcia MD       Hospital Day: 7         Date of Admission: 2025    Chief Complaint:    Chief Complaint   Patient presents with    Sweats     Pt presents to the ED with complaints of excessive sweating. Pt had a heart cath and was discharged yesterday dizzy. Pts wife states he was hot to the touch last night. Pts wife states that the dizziness and slurred speech started today round 1500. BGL was 301 at home. Pt now complaining of chest pain.     Dizziness        Hospital Course: Osmar Mckenna is a 80 y.o. male with a pmh of hypertension, HFpEF, paroxysmal atrial fibrillation on Eliquis, pacemaker in place, asthma,, type II DM who presented with generalized fatigue and abdominal pain. CT of the abdomen done in ER showed a 5.1 x 3.5 cm hypodense lesion in the liver. He was admitted for further workup and treatment.   Patient was followed by general surgery throughout his hospitalization.    Subjective: Patient seen and examined at bedside.  Family is present at bedside.  Patient has no new concerns this morning.  Drainage has been decreasing.  Patient had a repeat CT abdomen pelvis this morning while awaiting results.  Did discuss discharge planning and likely will go home on IV antibiotics pending final ID recommendations and plan patient and significant other verbalized understanding.    Assessment and Recommendations:    Hepatic abscess  - MRI abdomen showed 5.7 cm complex cystic-appearing peripherally enhancing lesion in the anterior left hepatic lobe which is new since 2025. A hepatic abscess is the favored etiology. Recommend continued imaging follow-up to assess for improvement and resolution with treatment, and to exclude neoplastic etiology.  - General Surgery consulted, 
      Hospitalist Progress Note      PCP: Masoud Garcia MD    Date of Admission: 6/25/2025    Chief Complaint: Generalized fatigue, abdominal pain    Hospital Course: Osmar Mckenna is a 80 y.o. male with a pmh of hypertension, HFpEF, paroxysmal atrial fibrillation on Eliquis, pacemaker in place,  asthma,, type II DM who presented with generalized fatigue and abdominal pain.  CT of the abdomen done in ER showed a 5.1 x 3.5 cm hypodense lesion in the liver.  He was admitted for further workup and treatment.    Subjective:  Pt sitting up in chair, reports he feels much better today. Discussed bacteremia. Pt reports he had food poisoning in January and just never felt well after that. Pt denies cp sob palpitations. Reports abd pain is improved. SO at bedside. Discussed with surgeon today, ok to resume eliquis. Reviewed POC, denies needs.     Assessment/Plan:    Hepatic abscess  - MRI abdomen showed 5.7 cm complex cystic-appearing peripherally enhancing lesion in the anterior left hepatic lobe which is new since 1/25/2025. A hepatic abscess is the favored etiology. Recommend continued imaging follow-up to assess for improvement and resolution with treatment, and to exclude neoplastic etiology.  - General Surgery consulted, plan is to trend output of drain and repeat CT once output is decreased.  - IR consulted to drain liver abscess.  6/28/2025 3 to 4 mL of blood-tinged pus was aspirated and sent for body fluid culture and anaerobic culture.  10 French drain confirmed an abscess by CT visualization.  Plan is to flush drain twice a day with 5 mL of sterile saline to avoid clogging.  -abscess fluid cultures growing klebsiella   - Continue Rocephin 2 g IV daily and Flagyl IV  - Monitor fever curve and white blood cell count trend  - Continue oxycodone as needed for pain control  - Okay to resume Eliquis per surgery, order placed    Sepsis  - Present on admission with reported fevers at home, leukocytosis, 
      Hospitalist Progress Note      PCP: Masoud Garcia MD    Date of Admission: 6/25/2025    Chief Complaint: Generalized fatigue, abdominal pain    Hospital Course: Osmar Mckenna is a 80 y.o. male with a pmh of hypertension, HFpEF, paroxysmal atrial fibrillation on Eliquis, pacemaker in place,  asthma,, type II DM who presented with generalized fatigue and abdominal pain.  CT of the abdomen done in ER showed a 5.1 x 3.5 cm hypodense lesion in the liver.  He was admitted for further workup and treatment.    Subjective:  Pt sitting up in chair, reports he feels much better today. Discussed bacteremia. Pt reports he had food poisoning in January and just never felt well after that. Pt denies cp sob palpitations. Reports abd pain is improved. SO at bedside. Reviewed POC, denies needs.     Assessment/Plan:    Hepatic abscess  - MRI abdomen showed 5.7 cm complex cystic-appearing peripherally enhancing lesion in the anterior left hepatic lobe which is new since 1/25/2025. A hepatic abscess is the favored etiology. Recommend continued imaging follow-up to assess for improvement and resolution   with treatment, and to exclude neoplastic etiology.  - General Surgery consulted  - IR consulted to drain liver abscess.  6/28/2025 3 to 4 mL of blood-tinged pus was aspirated and sent for body fluid culture and anaerobic culture.  10 French drain confirmed an abscess by CT visualization.  Plan is to flush drain twice a day with 5 mL of sterile saline to avoid clogging.  -abscess fluid cultures growing klebsiella   - Continue Rocephin 2 g IV daily and Flagyl IV  - Monitor fever curve and white blood cell count trend  - Continue oxycodone as needed for pain control  - Hold Eliquis for now    Sepsis  - Present on admission with reported fevers at home, leukocytosis, GIBRAN  - Source: Hepatic abscess  - Blood cultures were obtained 6/25/2025, now showing growth of klebsiella 1/2 postive   -Continue IV antibiotics  -Monitor 
      Hospitalist Progress Note      PCP: Masoud Garcia MD    Date of Admission: 6/25/2025    Chief Complaint: Generalized fatigue, abdominal pain    Hospital Course: Osmar Mckenna is a 80 y.o. male with a pmh of hypertension, HFpEF, paroxysmal atrial fibrillation on Eliquis, pacemaker in place,  asthma,, type II DM who presented with generalized fatigue and abdominal pain.  CT of the abdomen done in ER showed a 5.1 x 3.5 cm hypodense lesion in the liver.  He was admitted for further workup and treatment.    Subjective: Patient laying in bed, back from IR status post drainage of hepatic abscess.  Drain left in place.  Patient does report abdominal pain, but it is tolerable.  He states overall he feels better.  Significant other at bedside.  He denies chest pain, shortness breath, palpitation, headache, nausea or vomiting.  Reviewed plan of care, no further needs or questions.    Assessment/Plan:    Hepatic abscess  - MRI abdomen showed 5.7 cm complex cystic-appearing peripherally enhancing lesion in the anterior left hepatic lobe which is new since 1/25/2025. A hepatic abscess is the favored etiology. Recommend continued imaging follow-up to assess for improvement and resolution   with treatment, and to exclude neoplastic etiology.  - General Surgery consulted  - IR consulted to drain liver abscess.  6/28/2025 3 to 4 mL of blood-tinged pus was aspirated and sent for body fluid culture and anaerobic culture.  10 French drain confirmed an abscess by CT visualization.  Plan is to flush drain twice a day with 5 mL of sterile saline to avoid clogging.  - Continue Rocephin 2 g IV daily and Flagyl IV  - Monitor fever curve and white blood cell count trend  - Continue oxycodone as needed for pain control  - Hold Eliquis for now    Sepsis  - Present on admission with reported fevers at home, leukocytosis, GIBRAN  - Source: Hepatic abscess  - Blood cultures were obtained 6/25/2025, no growth to date  -Continue IV 
   06/26/25 1330   Encounter Summary   Encounter Overview/Reason Attempted Encounter   Service Provided For Patient not available   Referral/Consult From Rounding   Last Encounter  06/26/25  (Patient was not available/CK)       
   06/30/25 1426   Encounter Summary   Encounter Overview/Reason Attempted Encounter   Service Provided For Patient not available   Referral/Consult From Rounding   Last Encounter  06/30/25  (Patient was sleeping/CK)       
  Arrived to place PICC line with bedside RN. Pre-procedure and timeout done with RN, discussed limitations of placement and allergies.      Pt's basilic, brachial, cephalic are all easily collapsible with no indication for a clot. Vein selected is large enough for catheter. Pt tolerated sterile procedure well, with no difficulty accessing basilic vein, when accessed - blood was free flowing and non-pulsatile. Guidewire, introducer, and catheter went in smoothly.   PICC line verified with 3CG technology with peaked P-waves (please see image below). PICC tip terminates in the SVC according to [employer] 3CG tip confirmation system. PICC was seen dropping into SVC with tip tracking technology and discernable peaked p waves were noted without negative deflection.   OK to use PICC.   Please use new IV tubing when connecting to the newly placed central line.      Post procedure - reorganized pt table, placed pt in lowest position, with call light and educated on line care. Reported off to bedside RN.      Please call if you have any questions about the PICC or ML. The  will direct you to the PICC RN that is on call.      [phone]           
  [facility] - Rehabilitation Department       Occupational Therapy  3206/3206-01  [facility] 3 PROGRESSIVE CARE    [] Initial Evaluation            [x] Daily Treatment Note         [] Discharge Summary      Patient: Osmar Mckenna   : 1945   MRN: [id number]   Date of Service:  2025  Discharge Recommendations: Home w/ 24hr assist & HHOT     AM-PAC Inpatient Daily Activity Raw Score: 22    DME Required For Discharge: patient has all required DME for discharge    Therapy discharge recommendations take into account each patient's current medical complexities and are subject to input/oversight from the patient's healthcare team.     Barriers to Home Discharge:   [] Steps to access home entry or bed/bath:   [] Unable to transfer, ambulate, or propel wheelchair household distances without assist   [] Limited available assist at home upon discharge    [] Patient or family requests d/c to post-acute facility    [] Poor cognition/safety awareness for d/c to home alone    []Unable to maintain ordered weight bearing status    [] Patient with salient signs of long-standing immobility   [x] Patient is at risk for falls due to: decreased activity tolerance & balance   [x] Other: decrease ADL & fx mobility    If pt is unable to be seen after this session, please let this note serve as discharge summary.  Please see case management note for discharge disposition.  Thank you.    Admitting Diagnosis:  Liver lesion  Referring Physician: Katerina Saucedo APRN - [facility]   Current Admission Summary: Florentin Bone DO H&P :  \" Hospital Day: 1     Assessment and Plan:   Osmar Mckenna is a 80 y.o. male with a pmh of hypertension, HFpEF, paroxysmal atrial fibrillation on Eliquis, pacemaker in place,  asthma,, type II DM  who presents with Liver lesion     Hospital Problems             Last Modified POA     * (Principal) Liver lesion 2025 Yes      5.1 x 3.5 cm hypodense hepatic lesion -suspicious for 
  General Surgery Progress Note                 PATIENT NAME: Osmar Mckenna   Medical Record Number: [id number]  YOB: 1945       TODAY'S DATE: 6/30/2025      Chief Complaint   Patient presents with    Sweats     Pt presents to the ED with complaints of excessive sweating. Pt had a heart cath and was discharged yesterday dizzy. Pts wife states he was hot to the touch last night. Pts wife states that the dizziness and slurred speech started today round 1500. BGL was 301 at home. Pt now complaining of chest pain.     Dizziness         SUBJECTIVE:    Pt feels much better today.  Denies abdominal pain       OBJECTIVE:   VITALS:  /74   Pulse 99   Temp 97.7 °F (36.5 °C) (Oral)   Resp 16   Ht 1.626 m (5' 4\")   Wt 83.6 kg (184 lb 4.9 oz)   SpO2 96%   BMI 31.64 kg/m²       INTAKE/OUTPUT:    I/O last 3 completed shifts:  In: 861.1 [P.O.:150; Other:15; IV Piggyback:696.1]  Out: 65 [Drains:65]  I/O this shift:  In: 5 [Other:5]  Out: -               GENERAL: alert, no distress    LUNGS: Normal rate and effort  HEART: normal rate and regular rhythm  ABDOMEN: soft, non-tender, non-distended, and drain serosanguineous  EXTREMITY: no cyanosis, clubbing or edema      DATA:  CBC:   Recent Labs     06/28/25  0733 06/29/25  0935 06/30/25  0502   WBC 13.3* 11.0 8.9   HGB 14.3 13.7 12.7*   HCT 42.6 40.2* 37.6*    313 334     BMP:    Recent Labs     06/28/25  0733 06/28/25  0851 06/29/25  0658 06/30/25  0502     --  135* 138   K 3.4* 3.2* 3.9 3.1*     --  100 101   CO2 19*  --  20* 22   BUN 20  --  19 18   CREATININE 0.8  --  0.7* 0.7*   GLUCOSE 215*  --  215* 215*     Hepatic:   Recent Labs     06/28/25  0733 06/29/25  0658 06/30/25  0502   AST 59* 50* 24   ALT 38 39 25   BILITOT 0.9 0.7 0.5   ALKPHOS 112 106 99     Mag:      Recent Labs     06/28/25  0851 06/29/25  0658 06/30/25  0502   MG 1.7* 2.0 1.8      Phos:   No results for input(s): \"PHOS\" in the last 72 hours.   INR: No results 
  General Surgery Progress Note                 PATIENT NAME: Osmar Mckenna   Medical Record Number: [id number]  YOB: 1945       TODAY'S DATE: 7/2/2025      Chief Complaint   Patient presents with    Sweats     Pt presents to the ED with complaints of excessive sweating. Pt had a heart cath and was discharged yesterday dizzy. Pts wife states he was hot to the touch last night. Pts wife states that the dizziness and slurred speech started today round 1500. BGL was 301 at home. Pt now complaining of chest pain.     Dizziness         SUBJECTIVE:    Pt continues to feel well.  Denies abdominal pain       OBJECTIVE:   VITALS:  BP (!) 146/94   Pulse 92   Temp 97.9 °F (36.6 °C) (Oral)   Resp 17   Ht 1.626 m (5' 4\")   Wt 83.6 kg (184 lb 4.9 oz)   SpO2 95%   BMI 31.64 kg/m²       INTAKE/OUTPUT:    I/O last 3 completed shifts:  In: 30 [Other:30]  Out: 65 [Drains:65]  I/O this shift:  In: 370 [P.O.:360; Other:10]  Out: 8 [Drains:8]              GENERAL: alert, no distress    LUNGS: Normal rate and effort  HEART: normal rate and regular rhythm  ABDOMEN: soft, non-tender, non-distended, and drain serous  EXTREMITY: no cyanosis, clubbing or edema      DATA:  CBC:   Recent Labs     06/30/25  0502 07/01/25  0422 07/02/25  0515   WBC 8.9 9.8 8.5   HGB 12.7* 12.7* 12.1*   HCT 37.6* 37.5* 36.3*    377 393     BMP:    Recent Labs     06/30/25  0502 07/01/25  0422 07/02/25  0515    138 139   K 3.1* 3.7 3.4*    102 101   CO2 22 22 25   BUN 18 15 13   CREATININE 0.7* 0.6* 0.7*   GLUCOSE 215* 224* 206*     Hepatic:   Recent Labs     06/30/25  0502 07/01/25  0422 07/02/25  0515   AST 24 20 22   ALT 25 19 19   BILITOT 0.5 0.4 0.4   ALKPHOS 99 99 99     Mag:      Recent Labs     06/30/25  0502 07/02/25  0515   MG 1.8 1.6*      Phos:   No results for input(s): \"PHOS\" in the last 72 hours.   INR: No results for input(s): \"INR\" in the last 72 hours.            Assessment  Osmar Mckenna is a 80 
  General Surgery Progress Note                 PATIENT NAME: Osmar Mckenna   Medical Record Number: [id number]  YOB: 1945       TODAY'S DATE: 6/29/2025      Chief Complaint   Patient presents with    Sweats     Pt presents to the ED with complaints of excessive sweating. Pt had a heart cath and was discharged yesterday dizzy. Pts wife states he was hot to the touch last night. Pts wife states that the dizziness and slurred speech started today round 1500. BGL was 301 at home. Pt now complaining of chest pain.     Dizziness         SUBJECTIVE:    Pt feels much better today. Spending time with family. No nausea or abdominal pain     OBJECTIVE:   VITALS:  BP (!) 153/66   Pulse 94   Temp 97.5 °F (36.4 °C) (Oral)   Resp 18   Ht 1.626 m (5' 4\")   Wt 83.6 kg (184 lb 4.9 oz)   SpO2 96%   BMI 31.64 kg/m²       INTAKE/OUTPUT:    I/O last 3 completed shifts:  In: 130 [P.O.:120; Other:10]  Out: 35 [Drains:35]  I/O this shift:  In: 576.1 [P.O.:30; IV Piggyback:546.1]  Out: -               O/E:   Constitutional: Patient is oriented to person, place, and time. No distress.   HENT: mucus membranes - moist. No scleral icterus.   Neck: Supple and normal range of motion.   Pulmonary/Chest: Effort normal. No respiratory distress.   Abdominal: Drain in place   Neurological: Grossly intact motor and sensory exam  Skin: Skin is warm and dry.       DATA:  CBC:   Recent Labs     06/27/25  0428 06/28/25  0733 06/29/25  0935   WBC 11.2* 13.3* 11.0   HGB 13.7 14.3 13.7   HCT 40.4* 42.6 40.2*    266 313     BMP:    Recent Labs     06/27/25  0428 06/28/25  0733 06/28/25  0851 06/29/25  0658    136  --  135*   K 3.5 3.4* 3.2* 3.9    100  --  100   CO2 16* 19*  --  20*   BUN 21* 20  --  19   CREATININE 0.8 0.8  --  0.7*   GLUCOSE 182* 215*  --  215*     Hepatic:   Recent Labs     06/27/25  0428 06/28/25  0733 06/29/25  0658   AST 58* 59* 50*   ALT 37 38 39   BILITOT 1.1* 0.9 0.7   ALKPHOS 110 112 106 
  General Surgery Progress Note                 PATIENT NAME: Osmar Mckenna   Medical Record Number: [id number]  YOB: 1945       TODAY'S DATE: 7/1/2025      Chief Complaint   Patient presents with    Sweats     Pt presents to the ED with complaints of excessive sweating. Pt had a heart cath and was discharged yesterday dizzy. Pts wife states he was hot to the touch last night. Pts wife states that the dizziness and slurred speech started today round 1500. BGL was 301 at home. Pt now complaining of chest pain.     Dizziness         SUBJECTIVE:    Pt continues to feel well.  Denies abdominal pain       OBJECTIVE:   VITALS:  /78   Pulse 78   Temp 97.5 °F (36.4 °C) (Oral)   Resp 16   Ht 1.626 m (5' 4\")   Wt 83.6 kg (184 lb 4.9 oz)   SpO2 93%   BMI 31.64 kg/m²       INTAKE/OUTPUT:    I/O last 3 completed shifts:  In: 741.1 [P.O.:30; Other:15; IV Piggyback:696.1]  Out: 50 [Drains:50]  I/O this shift:  In: 5 [Other:5]  Out: -               GENERAL: alert, no distress    LUNGS: Normal rate and effort  HEART: normal rate and regular rhythm  ABDOMEN: soft, non-tender, non-distended, and drain serous  EXTREMITY: no cyanosis, clubbing or edema      DATA:  CBC:   Recent Labs     06/29/25  0935 06/30/25  0502 07/01/25  0422   WBC 11.0 8.9 9.8   HGB 13.7 12.7* 12.7*   HCT 40.2* 37.6* 37.5*    334 377     BMP:    Recent Labs     06/29/25  0658 06/30/25  0502 07/01/25  0422   * 138 138   K 3.9 3.1* 3.7    101 102   CO2 20* 22 22   BUN 19 18 15   CREATININE 0.7* 0.7* 0.6*   GLUCOSE 215* 215* 224*     Hepatic:   Recent Labs     06/29/25  0658 06/30/25  0502 07/01/25  0422   AST 50* 24 20   ALT 39 25 19   BILITOT 0.7 0.5 0.4   ALKPHOS 106 99 99     Mag:      Recent Labs     06/28/25  0851 06/29/25  0658 06/30/25  0502   MG 1.7* 2.0 1.8      Phos:   No results for input(s): \"PHOS\" in the last 72 hours.   INR: No results for input(s): \"INR\" in the last 72 
  [facility] - Rehabilitation Department      Physical Therapy  3206/3206-01  [facility] 3 PROGRESSIVE CARE    [] Initial Evaluation            [x] Daily Treatment Note         [] Discharge Summary      Patient: Osmar Mckenna   : 1945   MRN: [id number]   Date of Service:  2025   Discharge Recommendations: home with PRN assist HHPT  [facility] Raw Score:   AM-PAC Inpatient Mobility Raw Score : 19            DME Required For Discharge:   patient has all required DME for discharge  Therapy discharge recommendations take into account each patient's current medical complexities and are subject to input/oversight from the patient's healthcare team.   Barriers to Home Discharge:   [] Steps to access home entry or bed/bath:   [] Unable to transfer, ambulate, or propel wheelchair household distances without assist   [] Limited available assist at home upon discharge    [] Patient or family requests d/c to post-acute facility    [] Poor cognition/safety awareness for d/c to home alone    []Unable to maintain ordered weight bearing status    [] Patient with salient signs of long-standing immobility   [x] Patient is at risk for falls due to: decrease functional activity tolerance   [x] Other:  If pt is unable to be seen after this session, please let this note serve as discharge summary.  Please see case management note for discharge disposition.  Thank you.  Admitting Diagnosis: Liver lesion  Ordering Physician: Katerina Saucedo APRN - CNP   Current Admission Summary: Per Katerina Saucedo APRN - CNP   \"  Chief Complaint: Generalized fatigue, abdominal pain     Hospital Course: Osmar Mckenna is a 80 y.o. male with a pmh of hypertension, HFpEF, paroxysmal atrial fibrillation on Eliquis, pacemaker in place,  asthma,, type II DM who presented with generalized fatigue and abdominal pain.  CT of the abdomen done in ER showed a 5.1 x 3.5 cm hypodense lesion in the liver.  He was admitted for further 
  [facility] - Rehabilitation Department       Occupational Therapy  3206/3206-01  [facility] 3 PROGRESSIVE CARE    [x] Initial Evaluation            [x] Daily Treatment Note         [] Discharge Summary      Patient: Osmar Mckenna   : 1945   MRN: [id number]   Date of Service:  2025  Discharge Recommendations: Home w/ 24hr assist & HHOT     AM-PAC Inpatient Daily Activity Raw Score: 19    DME Required For Discharge: patient has all required DME for discharge    Therapy discharge recommendations take into account each patient's current medical complexities and are subject to input/oversight from the patient's healthcare team.     Barriers to Home Discharge:   [] Steps to access home entry or bed/bath:   [] Unable to transfer, ambulate, or propel wheelchair household distances without assist   [] Limited available assist at home upon discharge    [] Patient or family requests d/c to post-acute facility    [] Poor cognition/safety awareness for d/c to home alone    []Unable to maintain ordered weight bearing status    [] Patient with salient signs of long-standing immobility   [x] Patient is at risk for falls due to: decreased activity tolerance   [x] Other: decrease ADL & fx mobility    If pt is unable to be seen after this session, please let this note serve as discharge summary.  Please see case management note for discharge disposition.  Thank you.    Admitting Diagnosis:  Liver lesion  Referring Physician: Katerina Saucedo APRN - ELADIA   Current Admission Summary: Florentin Bone DO H&P :  \" Hospital Day: 1     Assessment and Plan:   Osmar Mckenna is a 80 y.o. male with a pmh of hypertension, HFpEF, paroxysmal atrial fibrillation on Eliquis, pacemaker in place,  asthma,, type II DM  who presents with Liver lesion     Hospital Problems             Last Modified POA     * (Principal) Liver lesion 2025 Yes      5.1 x 3.5 cm hypodense hepatic lesion -suspicious for malignancy 
  [facility] - Rehabilitation Department       Occupational Therapy  3206/3206-01  [facility] 3 PROGRESSIVE CARE    [] Initial Evaluation            [x] Daily Treatment Note         [] Discharge Summary      Patient: Osmar Mcknena   : 1945   MRN: [id number]   Date of Service:  2025  Discharge Recommendations: Home w/ 24hr assist & HHOT     AM-PAC Inpatient Daily Activity Raw Score: 20    DME Required For Discharge: patient has all required DME for discharge    Therapy discharge recommendations take into account each patient's current medical complexities and are subject to input/oversight from the patient's healthcare team.     Barriers to Home Discharge:   [] Steps to access home entry or bed/bath:   [] Unable to transfer, ambulate, or propel wheelchair household distances without assist   [] Limited available assist at home upon discharge    [] Patient or family requests d/c to post-acute facility    [] Poor cognition/safety awareness for d/c to home alone    []Unable to maintain ordered weight bearing status    [] Patient with salient signs of long-standing immobility   [x] Patient is at risk for falls due to: decreased activity tolerance & balance   [x] Other: decrease ADL & fx mobility    If pt is unable to be seen after this session, please let this note serve as discharge summary.  Please see case management note for discharge disposition.  Thank you.    Admitting Diagnosis:  Liver lesion  Referring Physician: Katerina Saucedo APRN - ELADIA   Current Admission Summary: Florentin Bone DO H&P :  \" Hospital Day: 1     Assessment and Plan:   Osmar Mckenna is a 80 y.o. male with a pmh of hypertension, HFpEF, paroxysmal atrial fibrillation on Eliquis, pacemaker in place,  asthma,, type II DM  who presents with Liver lesion     Hospital Problems             Last Modified POA     * (Principal) Liver lesion 2025 Yes      5.1 x 3.5 cm hypodense hepatic lesion -suspicious for 
  [facility] - Rehabilitation Department      Physical Therapy  3206/3206-01  [facility] 3 PROGRESSIVE CARE    [] Initial Evaluation            [x] Daily Treatment Note         [] Discharge Summary      Patient: Osmar Mckenna   : 1945   MRN: [id number]   Date of Service:  2025   Discharge Recommendations: home with PRN assist HHPT  [facility] Raw Score:   AM-PAC Inpatient Mobility Raw Score : 19            DME Required For Discharge:   patient has all required DME for discharge  Therapy discharge recommendations take into account each patient's current medical complexities and are subject to input/oversight from the patient's healthcare team.   Barriers to Home Discharge:   [] Steps to access home entry or bed/bath:   [] Unable to transfer, ambulate, or propel wheelchair household distances without assist   [] Limited available assist at home upon discharge    [] Patient or family requests d/c to post-acute facility    [] Poor cognition/safety awareness for d/c to home alone    []Unable to maintain ordered weight bearing status    [] Patient with salient signs of long-standing immobility   [x] Patient is at risk for falls due to: decrease functional activity tolerance   [x] Other:  If pt is unable to be seen after this session, please let this note serve as discharge summary.  Please see case management note for discharge disposition.  Thank you.  Admitting Diagnosis: Liver lesion  Ordering Physician: Katerina Saucedo APRN - CNP   Current Admission Summary: Per Katerina Saucedo APRN - CNP   \"  Chief Complaint: Generalized fatigue, abdominal pain     Hospital Course: Osmar Mckenna is a 80 y.o. male with a pmh of hypertension, HFpEF, paroxysmal atrial fibrillation on Eliquis, pacemaker in place,  asthma,, type II DM who presented with generalized fatigue and abdominal pain.  CT of the abdomen done in ER showed a 5.1 x 3.5 cm hypodense lesion in the liver.  He was admitted for further 
  [facility] - Rehabilitation Department      Physical Therapy  3206/3206-01  [facility] 3 PROGRESSIVE CARE    [x] Initial Evaluation            [x] Daily Treatment Note         [] Discharge Summary      Patient: Osmar Mckenna   : 1945   MRN: [id number]   Date of Service:  2025   Discharge Recommendations: home with PRN assist HHPT  [facility] Raw Score:   AM-PAC Inpatient Mobility Raw Score : 19            DME Required For Discharge:   patient has all required DME for discharge  Therapy discharge recommendations take into account each patient's current medical complexities and are subject to input/oversight from the patient's healthcare team.   Barriers to Home Discharge:   [] Steps to access home entry or bed/bath:   [] Unable to transfer, ambulate, or propel wheelchair household distances without assist   [] Limited available assist at home upon discharge    [] Patient or family requests d/c to post-acute facility    [] Poor cognition/safety awareness for d/c to home alone    []Unable to maintain ordered weight bearing status    [] Patient with salient signs of long-standing immobility   [x] Patient is at risk for falls due to: decrease functional activity tolerance   [x] Other:  If pt is unable to be seen after this session, please let this note serve as discharge summary.  Please see case management note for discharge disposition.  Thank you.  Admitting Diagnosis: Liver lesion  Ordering Physician: Katerina Saucedo APRN - CNP   Current Admission Summary: Per Katerina Saucedo APRN - CNP   \"  Chief Complaint: Generalized fatigue, abdominal pain     Hospital Course: Osmar Mckenna is a 80 y.o. male with a pmh of hypertension, HFpEF, paroxysmal atrial fibrillation on Eliquis, pacemaker in place,  asthma,, type II DM who presented with generalized fatigue and abdominal pain.  CT of the abdomen done in ER showed a 5.1 x 3.5 cm hypodense lesion in the liver.  He was admitted for further 
4 Eyes Skin Assessment     NAME:  Osmar Mckenna  YOB: 1945  MEDICAL RECORD NUMBER:  [id number]    The patient is being assessed for  Admission    I agree that at least one RN has performed a thorough Head to Toe Skin Assessment on the patient. ALL assessment sites listed below have been assessed.      Areas assessed by both nurses:    Head, Face, Ears, Shoulders, Back, Chest, Arms, Elbows, Hands, Sacrum. Buttock, Coccyx, Ischium, Legs. Feet and Heels, Under Medical Devices , and Other right groin        Does the Patient have a Wound? Yes wound(s) were present on assessment. LDA wound assessment was Initiated and completed by RN       Nima Prevention initiated by RN: No  Wound Care Orders initiated by RN: No    Pressure Injury (Stage 3,4, Unstageable, DTI, NWPT, and Complex wounds) if present, place Wound referral order by RN under : No    New Ostomies, if present place, Ostomy referral order under : No     Nurse 1 eSignature: Electronically signed by Yanna Healy RN on 6/26/25 at 4:11 AM EDT    **SHARE this note so that the co-signing nurse can place an eSignature**    Nurse 2 eSignature: Electronically signed by Maria Elena Cabrera RN on 6/26/25 at 5:01 AM EDT    
Abdominal liver abscess drain flushed with 5 mL sterile saline per orders; pt tolerated well.   
CT guided placement of 10 Danish drain in liver abscess    3-4 mL specimen of blood tinged pus aspirated and sent for body fluid culture and anaerobic culture.    10 Danish drain confirmed in abscess by CT visualization. Drain extends through abscess in curvilinear fashion with tip seen in the anterior subcapsular liver region (still within the abscess cavity).     Will leave orders for flushing drain twice per day with 5 mL sterile saline to avoid clogging.    Liver abscesses typically will take longer to resolve than peritoneal abscesses.  
Discussed procedure with patient, including indications (liver abscess) and proposed treatment plan (percutaneous drain placement in addition to continued IV antibiotics). Discussed procedure related pain/discomfort as well as discomfort for next several days from the presence of a drainage catheter in the upper abdomen.    Patient agreed to proceed.   
ETA for PICC nurse is 1500 today.   
Magnesium level add-on order placed per LEXUS Saucedo   
[facility]    Pharmacy Sliding Scale Insulin Dose-Adjustment Communication     Insulin correction dose algorithm adjusted for this patient. Patient previously on medium dose correction algorithm. Dose adjusted to high dose algorithm due to BG >180 x 2 over 24  hour period per protocol. ordered for patient.      Recent Labs     06/28/25  1123 06/28/25  1613 06/28/25 2055 06/29/25  0754   POCGLU 214* 220* 224* 188*     Pharmacy will continue to monitor adjust dose as necessary.      Babatunde Marion PharmD  [facility]   [id number]  6/29/2025   9:29 AM      
Nutrition Assessment     Type and Reason for Visit: LOS    Nutrition Recommendations/Plan:   Diet: Carb control (5CHO/meal)  ONS: None     Malnutrition Assessment:  Malnutrition Status: No malnutrition    Nutrition Assessment:  LOS. Pt with PMH of HTN, HF, and DM who presented with weakness and fever. CT showed liver lesion, drained now. Pt with decreased appetite and nause aon admission but has improved and has been eating >75% of meals on Carb control diet (5CHO/meal). No further nutrition interventions needed at this time.    Nutrition Related Findings:   BM 6/27; trace BLE edema; Glu 211, K+ 3.4 Wound Type: None    Current Nutrition Therapies:    ADULT DIET; Regular; 5 carb choices (75 gm/meal)    Anthropometric Measures:  Height: 162.6 cm (5' 4\")  Current Body Wt: 83.6 kg (184 lb 4.9 oz)   BMI: 31.6        Nutrition Diagnosis:   No nutrition diagnosis at this time     Nutrition Interventions:   Food and/or Nutrient Delivery: Continue Current Diet  Nutrition Education/Counseling: Education/Counseling not indicated  Coordination of Nutrition Care: Continue to monitor while inpatient     Discharge Planning:    No discharge needs at this time     Maria Elena Mcintyre RD, LD  Contact: Brenda Leblanc Dietitian  Maria Elena Mcnityre RD, LD: [phone]      
PICC team called for PICC line placement @ 6351.   
Patent and girlfriend frustrated that patient was told he was being discharged one e got a suction drain placed. Per Dr. Heck note he discussed with radiology and was attempting to find a suction drain this morning. This RN reached out to radiology to check on status with no response . NP made away family is upset. Left message for  at patients request.   
Patient experienced episode of V-tach for approximately 46 seconds. His pacemaker paced him out of it. Asymptomatic. Only pain noted is the pain he has been experiencing at his liver/abscess site. ; BP 95/59 MAP 71 while up in chair. Mag is 1.7, magnesium replacement started. NP aware, states he probably needed magnesium replacement. Telemetry strips printed and posted in chart.   
Placed new accordion drain and educated patient on drain.   
Pt has conditional [employer] pacemaker, cardiology form faxed to cardiologist. Awaiting return fax. Once returned will schedule pt. For MRI.  
Pt refused alarms. Education and 1:1 were provided.  
Report received from Kari CHAHAL. Patient up in chair, wife at bedside. This RN assumed care of this pt at this time.     Electronically signed by Maritza Hwang RN on 6/30/2025 at 4:46 PM   
Spiritual Health History and Assessment/Progress Note  [facility]    Advance Care Planning,  ,  ,      Name: Osmar Mckenna MRN: [id number]    Age: 80 y.o.     Sex: male   Language: English   Cheondoism: None   Liver lesion     Date: 7/2/2025            Total Time Calculated: 87 min              Spiritual Assessment began in [facility] 3 PROGRESSIVE CARE        Referral/Consult From: Rounding   Encounter Overview/Reason: Advance Care Planning  Service Provided For: Patient, Significant other    Tamika, Belief, Meaning:   Patient identifies as spiritual and has beliefs or practices that help with coping during difficult times  Family/Friends have beliefs or practices that help with coping during difficult times      Importance and Influence:  Patient has spiritual/personal beliefs that influence decisions regarding their health  Family/Friends have spiritual/personal beliefs that influence decisions regarding the patient's health    Community:  Patient feels well-supported. Support system includes: Spouse/Partner, Children, and Extended family  Family/Friends feel well-supported. Support system includes: Spouse/Partner, Children, Friends, and Extended family    Assessment and Plan of Care:     Patient Interventions include: Facilitated expression of thoughts and feelings, Affirmed coping skills/support systems, and Assisted in Advance Care Planning conversation  Family/Friends Interventions include: Facilitated expression of thoughts and feelings, Affirmed coping skills/support systems, and Assisted in Advance Care Planning conversation    Patient Plan of Care: Spiritual Care available upon further referral  Family/Friends Plan of Care: Spiritual Care available upon further referral    Electronically signed by Chaplain Nalini on 7/2/2025 at 4:20 PM   
[facility]    Pharmacy Sliding Scale Insulin Dose-Adjustment Communication     Insulin correction dose algorithm adjusted for this patient. Patient previously on low dose correction algorithm. Dose adjusted to medium dose algorithm due to BG >180 x 2 over 24  hour period per protocol. ordered for patient.      Recent Labs     06/27/25  1219 06/27/25  1714 06/27/25 2053 06/28/25  0758   POCGLU 275* 266* 320* 187*     Pharmacy will continue to monitor adjust dose as necessary.      Babatunde Marion PharmD  [facility]   [id number]  6/28/2025   9:28 AM    
making:  Discussion of patient care with other providers  Reviewed clinical lab tests  Reviewed radiology tests  Reviewed other diagnostic tests/interventions  Independent review of radiologic images  Independent review of  Microbiology cultures and other micro tests reviewed       Risk of Complications/Morbidity: High      Illness(es)/ Infection present that pose threat to bodily function.   There is potential for severe exacerbation of infection/side effects of treatment.  Therapy requires intensive monitoring for antimicrobial agent toxicity.  Review of Antibiotic resistance patterns and lab results  Management decisions including changes in Antimicrobial therapy and infection control strategies  Coordination with Micro lab, public health agencies or interdisciplinary teams      Thanks for allowing me to participate in your patient's care please call me with any questions or concerns.    Dr. Margarito Cohen MD  Infectious Disease  [facility] Physician  Phone: [phone]   Fax : [phone]

## 2025-07-02 NOTE — TELEPHONE ENCOUNTER
Louisa the pcu nurse called in regards to patient     She states that pt has discussed with Dr. Heck this morning that he may be discharged when suction is drained and placed and sees radiology.    Louisa states she has not heard from radiology all day, she has sent a message to the team, her message was read and no answer.    Radiology has not come down to see patient.     Louisa states the patient and family are getting upset.    Call back number is [phone]     Please advise

## 2025-07-02 NOTE — PLAN OF CARE
Problem: Chronic Conditions and Co-morbidities  Goal: Patient's chronic conditions and co-morbidity symptoms are monitored and maintained or improved  7/1/2025 2109 by Nicole Lowery RN  Outcome: Progressing  Flowsheets (Taken 7/1/2025 2109)  Care Plan - Patient's Chronic Conditions and Co-Morbidity Symptoms are Monitored and Maintained or Improved:   Monitor and assess patient's chronic conditions and comorbid symptoms for stability, deterioration, or improvement   Collaborate with multidisciplinary team to address chronic and comorbid conditions and prevent exacerbation or deterioration   Update acute care plan with appropriate goals if chronic or comorbid symptoms are exacerbated and prevent overall improvement and discharge  7/1/2025 1656 by Kari Browning RN  Outcome: Progressing     Problem: Discharge Planning  Goal: Discharge to home or other facility with appropriate resources  7/1/2025 2109 by Nicole Lowery RN  Outcome: Progressing  Flowsheets (Taken 7/1/2025 2109)  Discharge to home or other facility with appropriate resources:   Identify barriers to discharge with patient and caregiver   Identify discharge learning needs (meds, wound care, etc)   Arrange for needed discharge resources and transportation as appropriate  7/1/2025 1656 by Kari Browning RN  Outcome: Progressing     Problem: Pain  Goal: Verbalizes/displays adequate comfort level or baseline comfort level  7/1/2025 2109 by Nicole Lowery RN  Outcome: Progressing  Flowsheets (Taken 7/1/2025 2109)  Verbalizes/displays adequate comfort level or baseline comfort level:   Encourage patient to monitor pain and request assistance   Administer analgesics based on type and severity of pain and evaluate response   Consider cultural and social influences on pain and pain management   Assess pain using appropriate pain scale   Implement non-pharmacological measures as appropriate and evaluate response   Notify Licensed Independent

## 2025-07-02 NOTE — DISCHARGE SUMMARY
Hospital Medicine Discharge Summary    Patient ID: sOmar Mckenna      Patient's PCP: Masoud Garcia MD    Admit Date: 6/25/2025     Discharge Date: 7/2/2025      Admitting Physician: Florentin Bone DO     Discharge Physician: SAMANTHA Carey - CNP     Discharge Diagnoses  Hepatic abscess  Sepsis resolved  Bacteremia  Generalized weakness  GIBRAN on CKD stage II  Hypertension  Paroxysmal A-fib  S/p pacemaker  Hyperlipidemia  Asthma unspecified severity  HFpEF      Hospital Course:  Osmar Mckenna is a 80 y.o. male with a pmh of hypertension, HFpEF, paroxysmal atrial fibrillation on Eliquis, pacemaker in place, asthma,, type II DM who presented with generalized fatigue and abdominal pain. CT of the abdomen done in ER showed a 5.1 x 3.5 cm hypodense lesion in the liver. He was admitted for further workup and treatment.   Patient was followed by general surgery and infectious disease throughout his hospitalization.      Hepatic abscess  - MRI abdomen showed 5.7 cm complex cystic-appearing peripherally enhancing lesion in the anterior left hepatic lobe which is new since 1/25/2025. A hepatic abscess is the favored etiology. Recommend continued imaging follow-up to assess for improvement and resolution with treatment, and to exclude neoplastic etiology.  - General Surgery consulted, plan is to trend output of drain and repeat CT once output is decreased.  - IR consulted to drain liver abscess.  6/28/2025 3 to 4 mL of blood-tinged pus was aspirated and sent for body fluid culture and anaerobic culture.  10 [place] drain confirmed an abscess by CT visualization.  Plan is to flush drain twice a day with 5 mL of sterile saline to avoid clogging.  -Patient had suction drain exchange prior to discharge.  -abscess fluid cultures growing klebsiella   - While hospitalized patient received Rocephin 2 g IV daily and Flagyl IV  - Okay to resume Eliquis per surgery, order placed  -7/1-repeat CT abdomen

## 2025-07-02 NOTE — CARE COORDINATION
CASE MANAGEMENT DISCHARGE SUMMARY      Discharge to: home w/ [facility] ([facility]- only) and IV antibiotics from [facility]    IMM given: 7/2/25    Transportation:    Family/car: yes    Confirmed discharge plan with:     Patient: yes     Family:  yes, at bedside     Facility/Agency, name: [facility]     RN, name: Louisa    Note: Discharging nurse to complete ECOC, reconcile AVS, and place final copy with patient's discharge packet. RN to ensure that written prescriptions for  Level II medications are sent with patient to the facility as per protocol.

## 2025-07-02 NOTE — ACP (ADVANCE CARE PLANNING)
Advance Care Planning     Advance Care Planning Inpatient Note  [facility] Department    Today's Date: 7/2/2025  Unit: [facility] 3 PROGRESSIVE CARE    Received request from rounding.  Upon review of chart and communication with care team, patient's decision making abilities are not in question.. Patient and Healthcare Decision Maker was/were present in the room during visit.    Goals of ACP Conversation:  Discuss advance care planning documents  Facilitate a discussion related to patient's goals of care as they align with the patient's values and beliefs.    Health Care Decision Makers:       Primary Decision Maker: CAITY PARIS - Darrylfriend - [phone]  Summary:  Completed New Documents  Verified Healthcare Decision Maker    Advance Care Planning Documents (Patient Wishes):  Healthcare Power of /Advance Directive Appointment of Health Care Agent  Living Will/Advance Directive     Assessment:  As  was rounding and made a visit it came up in conversation that the patient wished his girlfriend to be able to make decisions for him. The patient endorsed that they have been in a relationship for 8 years and that he trusts her to be is HCPOA.     Interventions:  Provided education on documents for clarity and greater understanding  Assisted in the completion of documents according to patient's wishes at this time  Encouraged ongoing ACP conversation with future decision makers and loved ones    Care Preferences Communicated:   No    Outcomes/Plan:  ACP Discussion: Completed  New advance directive completed.  Returned original document(s) to patient, as well as copies for distribution to appointed agents  Copy of advance directive given to staff to scan into medical record.  Teach Back Method used to verify the patient's and/or Healthcare Decision Maker's understanding of key information in the advance directive documents    Electronically signed by Chaplain Nalini on 7/2/2025 at 4:14 PM

## 2025-07-02 NOTE — CARE COORDINATION
Marisamed updated on abx plan. Spoke with patient and SO at bedside, requested [facility], referral made.    Treva Eddy RN    Addendum: [facility] to be here this afternoon to do bedside teaching and meds to be delivered this evening. Follow-up call to [facility] to notify of d/c today.    Treva Eddy RN      Addendum: Pat completed bedside teaching. Provided Eliquis free trial and co-pay assistance information to patient, as requested. Chris to do SOC tomorrow.    Treva Eddy RN

## 2025-07-03 DIAGNOSIS — A41.59 KLEBSIELLA SEPSIS (HCC): Primary | ICD-10-CM

## 2025-07-03 LAB
MICROORGANISM SPEC CULT: NORMAL
MICROORGANISM SPEC CULT: NORMAL
SERVICE CMNT-IMP: NORMAL
SERVICE CMNT-IMP: NORMAL
SPECIMEN DESCRIPTION: NORMAL
SPECIMEN DESCRIPTION: NORMAL

## 2025-07-07 ENCOUNTER — TELEPHONE (OUTPATIENT)
Dept: INFECTIOUS DISEASES | Age: 80
End: 2025-07-07

## 2025-07-07 LAB
ALBUMIN: 3.5 G/DL (ref 3.5–5.7)
ALP BLD-CCNC: 111 IU/L (ref 35–135)
ALT SERPL-CCNC: 16 IU/L (ref 10–60)
ANION GAP SERPL CALCULATED.3IONS-SCNC: 9 MMOL/L (ref 4–16)
AST SERPL-CCNC: 18 IU/L (ref 10–40)
ATYPICAL LYMPHOCYTES: 0.1 THOU/MCL
ATYPICAL LYMPHOCYTES: 1 %
BASOPHILS ABSOLUTE: 0 THOU/MCL (ref 0–0.2)
BASOPHILS RELATIVE PERCENT: 0 %
BILIRUB SERPL-MCNC: 0.6 MG/DL (ref 0–1.2)
BUN BLDV-MCNC: 19 MG/DL (ref 8–26)
C-REACTIVE PROTEIN WIDE RANGE: 17 MG/L
CALCIUM SERPL-MCNC: 8.7 MG/DL (ref 8.5–10.4)
CHLORIDE BLD-SCNC: 100 MMOL/L (ref 98–111)
CO2: 29 MMOL/L (ref 21–31)
CREAT SERPL-MCNC: 0.83 MG/DL (ref 0.7–1.3)
EGFR (CKD-EPI): 89 ML/MIN/1.73 M2
EOSINOPHILS ABSOLUTE: 0.19 THOU/MCL (ref 0.03–0.45)
EOSINOPHILS RELATIVE PERCENT: 2 %
GLUCOSE BLD-MCNC: 154 MG/DL (ref 70–99)
GRANULOCYTE ABSOLUTE COUNT: 7.59 THOU/MCL (ref 1.8–7.7)
HCT VFR BLD CALC: 37.5 % (ref 40–50)
HEMOGLOBIN: 12.6 G/DL (ref 13.5–16.5)
LYMPHOCYTES # BLD: 15 %
LYMPHOCYTES ABSOLUTE: 1.43 THOU/MCL (ref 1–4)
MCH RBC QN AUTO: 29.5 PG (ref 27–33)
MCHC RBC AUTO-ENTMCNC: 33.5 G/DL (ref 32–36)
MCV RBC AUTO: 88.1 FL (ref 82–97)
MONOCYTES ABSOLUTE: 0.19 THOU/MCL (ref 0.2–0.9)
MONOCYTES RELATIVE PERCENT: 2 %
PDW BLD-RTO: 13.7 % (ref 12.3–17)
PLATELET # BLD: 540 THOU/MCL (ref 140–375)
PMV BLD AUTO: 6.9 FL (ref 7–11.5)
POTASSIUM SERPL-SCNC: 4.5 MMOL/L (ref 3.6–5.1)
RBC # BLD: 4.26 MIL/MCL (ref 4.4–5.8)
RBC # BLD: ABNORMAL 10*6/UL
SED RATE, AUTOMATED: 36 MM/HR
SEG NEUTROPHILS: 80 %
SODIUM BLD-SCNC: 138 MMOL/L (ref 135–145)
TOTAL PROTEIN: 6.2 G/DL (ref 6–8)
WBC # BLD: 9.2 THOU/MCL (ref 3.6–10.5)

## 2025-07-07 NOTE — TELEPHONE ENCOUNTER
Need clarification on blood cx order. Blood cx due in 3 weeks, 1 week past end date. Are we doing  for 1 week post end date and then getting cx's? Please advise.    Thanks.

## 2025-07-09 ENCOUNTER — HOSPITAL ENCOUNTER (OUTPATIENT)
Age: 80
Setting detail: OBSERVATION
Discharge: HOME | End: 2025-07-10
Attending: EMERGENCY MEDICINE | Admitting: STUDENT IN AN ORGANIZED HEALTH CARE EDUCATION/TRAINING PROGRAM
Payer: MEDICARE

## 2025-07-09 ENCOUNTER — APPOINTMENT (OUTPATIENT)
Age: 80
End: 2025-07-09
Payer: MEDICARE

## 2025-07-09 DIAGNOSIS — K91.89 POSTOPERATIVE SURGICAL COMPLICATION INVOLVING DIGESTIVE SYSTEM ASSOCIATED WITH DIGESTIVE SYSTEM PROCEDURE, UNSPECIFIED COMPLICATION: Primary | ICD-10-CM

## 2025-07-09 DIAGNOSIS — K75.0 HEPATIC ABSCESS: ICD-10-CM

## 2025-07-09 DIAGNOSIS — K91.89: ICD-10-CM

## 2025-07-09 LAB
ALBUMIN SERPL-MCNC: 3.8 G/DL (ref 3.4–5)
ALBUMIN/GLOB SERPL: 1.1 {RATIO}
ALP SERPL-CCNC: 122 U/L (ref 40–129)
ALT SERPL-CCNC: 22 U/L (ref 10–40)
ANION GAP SERPL CALCULATED.3IONS-SCNC: 14 MMOL/L (ref 3–16)
AST SERPL-CCNC: 22 U/L (ref 15–37)
BASOPHILS # BLD: 0.05 K/UL (ref 0–0.2)
BASOPHILS NFR BLD: 1 %
BILIRUB SERPL-MCNC: 0.4 MG/DL (ref 0–1)
BUN SERPL-MCNC: 16 MG/DL (ref 7–20)
CALCIUM SERPL-MCNC: 9.5 MG/DL (ref 8.3–10.6)
CHLORIDE SERPL-SCNC: 104 MMOL/L (ref 99–110)
CO2 SERPL-SCNC: 23 MMOL/L (ref 21–32)
CREAT SERPL-MCNC: 0.9 MG/DL (ref 0.8–1.3)
EOSINOPHIL # BLD: 0.2 K/UL (ref 0–0.6)
EOSINOPHILS RELATIVE PERCENT: 2 %
ERYTHROCYTE [DISTWIDTH] IN BLOOD BY AUTOMATED COUNT: 13.1 % (ref 12.4–15.4)
GFR, ESTIMATED: 87 ML/MIN/1.73M2
GLUCOSE SERPL-MCNC: 214 MG/DL (ref 70–99)
HCT VFR BLD AUTO: 39.7 % (ref 40.5–52.5)
HGB BLD-MCNC: 13 G/DL (ref 13.5–17.5)
IMM GRANULOCYTES # BLD AUTO: 0.09 K/UL (ref 0–0.5)
IMM GRANULOCYTES NFR BLD: 1 %
LIPASE SERPL-CCNC: 56 U/L (ref 13–60)
LYMPHOCYTES NFR BLD: 1.85 K/UL (ref 1–5.1)
LYMPHOCYTES RELATIVE PERCENT: 22 %
MCH RBC QN AUTO: 29.7 PG (ref 26–34)
MCHC RBC AUTO-ENTMCNC: 32.7 G/DL (ref 31–36)
MCV RBC AUTO: 90.8 FL (ref 80–100)
MONOCYTES NFR BLD: 0.61 K/UL (ref 0–1.3)
MONOCYTES NFR BLD: 7 %
NEUTROPHILS NFR BLD: 67 %
NEUTS SEG NFR BLD: 5.77 K/UL (ref 1.7–7.7)
PLATELET # BLD AUTO: 494 K/UL (ref 135–450)
PMV BLD AUTO: 8.6 FL (ref 9.4–12.4)
POTASSIUM SERPL-SCNC: 4.2 MMOL/L (ref 3.5–5.1)
PROT SERPL-MCNC: 7.1 G/DL (ref 6.4–8.2)
RBC # BLD AUTO: 4.37 M/UL (ref 4.2–5.9)
SODIUM SERPL-SCNC: 141 MMOL/L (ref 136–145)
WBC OTHER # BLD: 8.6 K/UL (ref 4–11)

## 2025-07-09 PROCEDURE — 99285 EMERGENCY DEPT VISIT HI MDM: CPT

## 2025-07-09 PROCEDURE — G0378 HOSPITAL OBSERVATION PER HR: HCPCS

## 2025-07-09 PROCEDURE — 74177 CT ABD & PELVIS W/CONTRAST: CPT

## 2025-07-09 PROCEDURE — 80053 COMPREHEN METABOLIC PANEL: CPT

## 2025-07-09 PROCEDURE — 85025 COMPLETE CBC W/AUTO DIFF WBC: CPT

## 2025-07-09 PROCEDURE — 6360000004 HC RX CONTRAST MEDICATION: Performed by: EMERGENCY MEDICINE

## 2025-07-09 PROCEDURE — 83690 ASSAY OF LIPASE: CPT

## 2025-07-09 RX ORDER — ENOXAPARIN SODIUM 100 MG/ML
40 INJECTION SUBCUTANEOUS DAILY
Status: DISCONTINUED | OUTPATIENT
Start: 2025-07-10 | End: 2025-07-10 | Stop reason: ALTCHOICE

## 2025-07-09 RX ORDER — ONDANSETRON 2 MG/ML
4 INJECTION INTRAMUSCULAR; INTRAVENOUS EVERY 6 HOURS PRN
Status: DISCONTINUED | OUTPATIENT
Start: 2025-07-09 | End: 2025-07-10 | Stop reason: HOSPADM

## 2025-07-09 RX ORDER — MAGNESIUM SULFATE IN WATER 40 MG/ML
2000 INJECTION, SOLUTION INTRAVENOUS PRN
Status: DISCONTINUED | OUTPATIENT
Start: 2025-07-09 | End: 2025-07-10 | Stop reason: HOSPADM

## 2025-07-09 RX ORDER — POLYETHYLENE GLYCOL 3350 17 G/17G
17 POWDER, FOR SOLUTION ORAL DAILY PRN
Status: DISCONTINUED | OUTPATIENT
Start: 2025-07-09 | End: 2025-07-10 | Stop reason: HOSPADM

## 2025-07-09 RX ORDER — ACETAMINOPHEN 650 MG/1
650 SUPPOSITORY RECTAL EVERY 6 HOURS PRN
Status: DISCONTINUED | OUTPATIENT
Start: 2025-07-09 | End: 2025-07-10 | Stop reason: HOSPADM

## 2025-07-09 RX ORDER — SODIUM CHLORIDE 0.9 % (FLUSH) 0.9 %
5-40 SYRINGE (ML) INJECTION EVERY 12 HOURS SCHEDULED
Status: DISCONTINUED | OUTPATIENT
Start: 2025-07-09 | End: 2025-07-10 | Stop reason: HOSPADM

## 2025-07-09 RX ORDER — IOPAMIDOL 755 MG/ML
75 INJECTION, SOLUTION INTRAVASCULAR
Status: COMPLETED | OUTPATIENT
Start: 2025-07-09 | End: 2025-07-09

## 2025-07-09 RX ORDER — ACETAMINOPHEN 325 MG/1
650 TABLET ORAL EVERY 6 HOURS PRN
Status: DISCONTINUED | OUTPATIENT
Start: 2025-07-09 | End: 2025-07-10 | Stop reason: HOSPADM

## 2025-07-09 RX ORDER — SODIUM CHLORIDE 9 MG/ML
INJECTION, SOLUTION INTRAVENOUS PRN
Status: DISCONTINUED | OUTPATIENT
Start: 2025-07-09 | End: 2025-07-10 | Stop reason: HOSPADM

## 2025-07-09 RX ORDER — SODIUM CHLORIDE 0.9 % (FLUSH) 0.9 %
5-40 SYRINGE (ML) INJECTION PRN
Status: DISCONTINUED | OUTPATIENT
Start: 2025-07-09 | End: 2025-07-10 | Stop reason: HOSPADM

## 2025-07-09 RX ORDER — POTASSIUM CHLORIDE 1500 MG/1
40 TABLET, EXTENDED RELEASE ORAL PRN
Status: DISCONTINUED | OUTPATIENT
Start: 2025-07-09 | End: 2025-07-10 | Stop reason: HOSPADM

## 2025-07-09 RX ORDER — POTASSIUM CHLORIDE 7.45 MG/ML
10 INJECTION INTRAVENOUS PRN
Status: DISCONTINUED | OUTPATIENT
Start: 2025-07-09 | End: 2025-07-10 | Stop reason: HOSPADM

## 2025-07-09 RX ORDER — ONDANSETRON 4 MG/1
4 TABLET, ORALLY DISINTEGRATING ORAL EVERY 8 HOURS PRN
Status: DISCONTINUED | OUTPATIENT
Start: 2025-07-09 | End: 2025-07-10 | Stop reason: HOSPADM

## 2025-07-09 RX ADMIN — IOPAMIDOL 75 ML: 755 INJECTION, SOLUTION INTRAVENOUS at 22:10

## 2025-07-09 ASSESSMENT — PAIN - FUNCTIONAL ASSESSMENT: PAIN_FUNCTIONAL_ASSESSMENT: NONE - DENIES PAIN

## 2025-07-10 ENCOUNTER — APPOINTMENT (OUTPATIENT)
Age: 80
End: 2025-07-10
Attending: STUDENT IN AN ORGANIZED HEALTH CARE EDUCATION/TRAINING PROGRAM
Payer: MEDICARE

## 2025-07-10 ENCOUNTER — APPOINTMENT (OUTPATIENT)
Age: 80
End: 2025-07-10
Payer: MEDICARE

## 2025-07-10 ENCOUNTER — APPOINTMENT (OUTPATIENT)
Age: 80
End: 2025-07-10
Attending: SURGERY
Payer: MEDICARE

## 2025-07-10 VITALS
RESPIRATION RATE: 18 BRPM | OXYGEN SATURATION: 100 % | DIASTOLIC BLOOD PRESSURE: 67 MMHG | BODY MASS INDEX: 31.43 KG/M2 | HEIGHT: 64 IN | TEMPERATURE: 98.6 F | HEART RATE: 76 BPM | WEIGHT: 184.08 LBS | SYSTOLIC BLOOD PRESSURE: 132 MMHG

## 2025-07-10 PROBLEM — K91.89 POSTOPERATIVE SURGICAL COMPLICATION INVOLVING DIGESTIVE SYSTEM ASSOCIATED WITH DIGESTIVE SYSTEM PROCEDURE: Status: ACTIVE | Noted: 2025-07-10

## 2025-07-10 LAB
ALBUMIN SERPL-MCNC: 3.4 G/DL (ref 3.4–5)
ALBUMIN/GLOB SERPL: 1.1 {RATIO}
ALP SERPL-CCNC: 107 U/L (ref 40–129)
ALT SERPL-CCNC: 15 U/L (ref 10–40)
ANION GAP SERPL CALCULATED.3IONS-SCNC: 13 MMOL/L (ref 3–16)
AST SERPL-CCNC: 19 U/L (ref 15–37)
BASOPHILS # BLD: 0.04 K/UL (ref 0–0.2)
BASOPHILS NFR BLD: 1 %
BILIRUB SERPL-MCNC: 0.3 MG/DL (ref 0–1)
BUN SERPL-MCNC: 14 MG/DL (ref 7–20)
CALCIUM SERPL-MCNC: 9.1 MG/DL (ref 8.3–10.6)
CHLORIDE SERPL-SCNC: 105 MMOL/L (ref 99–110)
CO2 SERPL-SCNC: 23 MMOL/L (ref 21–32)
CREAT SERPL-MCNC: 0.8 MG/DL (ref 0.8–1.3)
ECHO BSA: 1.94 M2
ECHO BSA: 1.94 M2
EOSINOPHIL # BLD: 0.21 K/UL (ref 0–0.6)
EOSINOPHILS RELATIVE PERCENT: 3 %
ERYTHROCYTE [DISTWIDTH] IN BLOOD BY AUTOMATED COUNT: 13 % (ref 12.4–15.4)
GFR, ESTIMATED: 89 ML/MIN/1.73M2
GLUCOSE BLD-MCNC: 153 MG/DL (ref 70–99)
GLUCOSE BLD-MCNC: 154 MG/DL (ref 70–99)
GLUCOSE BLD-MCNC: 181 MG/DL (ref 70–99)
GLUCOSE SERPL-MCNC: 145 MG/DL (ref 70–99)
HCT VFR BLD AUTO: 38.4 % (ref 40.5–52.5)
HGB BLD-MCNC: 12.7 G/DL (ref 13.5–17.5)
IMM GRANULOCYTES # BLD AUTO: 0.09 K/UL (ref 0–0.5)
IMM GRANULOCYTES NFR BLD: 1 %
INR PPP: 1.1 (ref 0.9–1.1)
LYMPHOCYTES NFR BLD: 1.68 K/UL (ref 1–5.1)
LYMPHOCYTES RELATIVE PERCENT: 20 %
MCH RBC QN AUTO: 30.2 PG (ref 26–34)
MCHC RBC AUTO-ENTMCNC: 33.1 G/DL (ref 31–36)
MCV RBC AUTO: 91.2 FL (ref 80–100)
MONOCYTES NFR BLD: 0.57 K/UL (ref 0–1.3)
MONOCYTES NFR BLD: 7 %
NEUTROPHILS NFR BLD: 69 %
NEUTS SEG NFR BLD: 5.85 K/UL (ref 1.7–7.7)
PLATELET # BLD AUTO: 424 K/UL (ref 135–450)
PMV BLD AUTO: 8.7 FL
POTASSIUM SERPL-SCNC: 3.8 MMOL/L (ref 3.5–5.1)
PROT SERPL-MCNC: 6.5 G/DL (ref 6.4–8.2)
PROTHROMBIN TIME: 14.2 SEC (ref 12.1–14.9)
RBC # BLD AUTO: 4.21 M/UL (ref 4.2–5.9)
SODIUM SERPL-SCNC: 141 MMOL/L (ref 136–145)
WBC OTHER # BLD: 8.4 K/UL (ref 4–11)

## 2025-07-10 PROCEDURE — G0378 HOSPITAL OBSERVATION PER HR: HCPCS

## 2025-07-10 PROCEDURE — 49424 ASSESS CYST CONTRAST INJECT: CPT

## 2025-07-10 PROCEDURE — 6370000000 HC RX 637 (ALT 250 FOR IP): Performed by: STUDENT IN AN ORGANIZED HEALTH CARE EDUCATION/TRAINING PROGRAM

## 2025-07-10 PROCEDURE — 99222 1ST HOSP IP/OBS MODERATE 55: CPT | Performed by: SURGERY

## 2025-07-10 PROCEDURE — 76080 X-RAY EXAM OF FISTULA: CPT | Performed by: STUDENT IN AN ORGANIZED HEALTH CARE EDUCATION/TRAINING PROGRAM

## 2025-07-10 PROCEDURE — 6360000004 HC RX CONTRAST MEDICATION: Performed by: STUDENT IN AN ORGANIZED HEALTH CARE EDUCATION/TRAINING PROGRAM

## 2025-07-10 PROCEDURE — 80053 COMPREHEN METABOLIC PANEL: CPT

## 2025-07-10 PROCEDURE — 75984 XRAY CONTROL CATHETER CHANGE: CPT | Performed by: STUDENT IN AN ORGANIZED HEALTH CARE EDUCATION/TRAINING PROGRAM

## 2025-07-10 PROCEDURE — 82962 GLUCOSE BLOOD TEST: CPT

## 2025-07-10 PROCEDURE — 6360000002 HC RX W HCPCS: Performed by: STUDENT IN AN ORGANIZED HEALTH CARE EDUCATION/TRAINING PROGRAM

## 2025-07-10 PROCEDURE — 36415 COLL VENOUS BLD VENIPUNCTURE: CPT

## 2025-07-10 PROCEDURE — 85610 PROTHROMBIN TIME: CPT

## 2025-07-10 PROCEDURE — 49423 EXCHANGE DRAINAGE CATHETER: CPT | Performed by: STUDENT IN AN ORGANIZED HEALTH CARE EDUCATION/TRAINING PROGRAM

## 2025-07-10 PROCEDURE — 85025 COMPLETE CBC W/AUTO DIFF WBC: CPT

## 2025-07-10 PROCEDURE — C1729 CATH, DRAINAGE: HCPCS

## 2025-07-10 PROCEDURE — 71045 X-RAY EXAM CHEST 1 VIEW: CPT

## 2025-07-10 PROCEDURE — C1769 GUIDE WIRE: HCPCS

## 2025-07-10 PROCEDURE — 2500000003 HC RX 250 WO HCPCS: Performed by: STUDENT IN AN ORGANIZED HEALTH CARE EDUCATION/TRAINING PROGRAM

## 2025-07-10 PROCEDURE — 2709999900 HC NON-CHARGEABLE SUPPLY

## 2025-07-10 PROCEDURE — 96374 THER/PROPH/DIAG INJ IV PUSH: CPT

## 2025-07-10 RX ORDER — IOPAMIDOL 612 MG/ML
INJECTION, SOLUTION INTRATHECAL PRN
Status: COMPLETED | OUTPATIENT
Start: 2025-07-10 | End: 2025-07-10

## 2025-07-10 RX ORDER — SOTALOL HYDROCHLORIDE 80 MG/1
80 TABLET ORAL 2 TIMES DAILY
Status: DISCONTINUED | OUTPATIENT
Start: 2025-07-10 | End: 2025-07-10 | Stop reason: HOSPADM

## 2025-07-10 RX ORDER — MONTELUKAST SODIUM 10 MG/1
10 TABLET ORAL NIGHTLY
Status: DISCONTINUED | OUTPATIENT
Start: 2025-07-10 | End: 2025-07-10 | Stop reason: HOSPADM

## 2025-07-10 RX ORDER — DEXTROSE MONOHYDRATE 100 MG/ML
INJECTION, SOLUTION INTRAVENOUS CONTINUOUS PRN
Status: DISCONTINUED | OUTPATIENT
Start: 2025-07-10 | End: 2025-07-10 | Stop reason: HOSPADM

## 2025-07-10 RX ORDER — GLUCAGON 1 MG/ML
1 KIT INJECTION PRN
Status: DISCONTINUED | OUTPATIENT
Start: 2025-07-10 | End: 2025-07-10 | Stop reason: HOSPADM

## 2025-07-10 RX ORDER — IOPAMIDOL 755 MG/ML
INJECTION, SOLUTION INTRAVASCULAR PRN
Status: COMPLETED | OUTPATIENT
Start: 2025-07-10 | End: 2025-07-10

## 2025-07-10 RX ORDER — LIDOCAINE HYDROCHLORIDE 10 MG/ML
INJECTION, SOLUTION EPIDURAL; INFILTRATION; INTRACAUDAL; PERINEURAL PRN
Status: COMPLETED | OUTPATIENT
Start: 2025-07-10 | End: 2025-07-10

## 2025-07-10 RX ORDER — NIFEDIPINE 30 MG
30 TABLET, EXTENDED RELEASE ORAL DAILY
Status: DISCONTINUED | OUTPATIENT
Start: 2025-07-10 | End: 2025-07-10 | Stop reason: HOSPADM

## 2025-07-10 RX ORDER — ATORVASTATIN CALCIUM 20 MG/1
20 TABLET, FILM COATED ORAL EVERY EVENING
Status: DISCONTINUED | OUTPATIENT
Start: 2025-07-10 | End: 2025-07-10 | Stop reason: HOSPADM

## 2025-07-10 RX ORDER — LISINOPRIL 10 MG/1
10 TABLET ORAL DAILY
Status: DISCONTINUED | OUTPATIENT
Start: 2025-07-10 | End: 2025-07-10 | Stop reason: HOSPADM

## 2025-07-10 RX ORDER — METRONIDAZOLE 500 MG/1
500 TABLET ORAL EVERY 8 HOURS SCHEDULED
Status: DISCONTINUED | OUTPATIENT
Start: 2025-07-10 | End: 2025-07-10 | Stop reason: HOSPADM

## 2025-07-10 RX ORDER — INSULIN LISPRO 100 [IU]/ML
0-4 INJECTION, SOLUTION INTRAVENOUS; SUBCUTANEOUS
Status: DISCONTINUED | OUTPATIENT
Start: 2025-07-10 | End: 2025-07-10 | Stop reason: HOSPADM

## 2025-07-10 RX ORDER — ENOXAPARIN SODIUM 100 MG/ML
40 INJECTION SUBCUTANEOUS DAILY
Status: DISCONTINUED | OUTPATIENT
Start: 2025-07-10 | End: 2025-07-10 | Stop reason: HOSPADM

## 2025-07-10 RX ADMIN — NIFEDIPINE 30 MG: 30 TABLET, EXTENDED RELEASE ORAL at 08:04

## 2025-07-10 RX ADMIN — IOPAMIDOL 5 ML: 755 INJECTION, SOLUTION INTRAVENOUS at 11:48

## 2025-07-10 RX ADMIN — IOPAMIDOL 5 ML: 755 INJECTION, SOLUTION INTRAVENOUS at 11:52

## 2025-07-10 RX ADMIN — LIDOCAINE HYDROCHLORIDE 10 ML: 10 INJECTION, SOLUTION EPIDURAL; INFILTRATION; INTRACAUDAL; PERINEURAL at 11:43

## 2025-07-10 RX ADMIN — INSULIN LISPRO 1 UNITS: 100 INJECTION, SOLUTION INTRAVENOUS; SUBCUTANEOUS at 02:57

## 2025-07-10 RX ADMIN — METRONIDAZOLE 500 MG: 500 TABLET ORAL at 09:35

## 2025-07-10 RX ADMIN — SODIUM CHLORIDE, PRESERVATIVE FREE 10 ML: 5 INJECTION INTRAVENOUS at 08:10

## 2025-07-10 RX ADMIN — LISINOPRIL 10 MG: 10 TABLET ORAL at 08:04

## 2025-07-10 RX ADMIN — WATER 1000 MG: 1 INJECTION INTRAMUSCULAR; INTRAVENOUS; SUBCUTANEOUS at 08:04

## 2025-07-10 RX ADMIN — SOTALOL HYDROCHLORIDE 80 MG: 80 TABLET ORAL at 08:04

## 2025-07-10 RX ADMIN — SODIUM CHLORIDE, PRESERVATIVE FREE 10 ML: 5 INJECTION INTRAVENOUS at 00:37

## 2025-07-10 ASSESSMENT — PAIN DESCRIPTION - ONSET
ONSET: ON-GOING
ONSET: AWAKENED FROM SLEEP

## 2025-07-10 ASSESSMENT — PAIN DESCRIPTION - ORIENTATION
ORIENTATION: LEFT
ORIENTATION: OTHER (COMMENT)
ORIENTATION: OTHER (COMMENT)

## 2025-07-10 ASSESSMENT — PAIN SCALES - GENERAL
PAINLEVEL_OUTOF10: 0

## 2025-07-10 ASSESSMENT — PAIN DESCRIPTION - DESCRIPTORS
DESCRIPTORS: OTHER (COMMENT)

## 2025-07-10 ASSESSMENT — PAIN - FUNCTIONAL ASSESSMENT
PAIN_FUNCTIONAL_ASSESSMENT: ACTIVITIES ARE NOT PREVENTED

## 2025-07-10 ASSESSMENT — PAIN DESCRIPTION - PAIN TYPE
TYPE: ACUTE PAIN

## 2025-07-10 ASSESSMENT — PAIN DESCRIPTION - LOCATION
LOCATION: ABDOMEN

## 2025-07-10 ASSESSMENT — PAIN DESCRIPTION - FREQUENCY
FREQUENCY: INTERMITTENT
FREQUENCY: CONTINUOUS
FREQUENCY: CONTINUOUS

## 2025-07-10 ASSESSMENT — PAIN SCALES - WONG BAKER: WONGBAKER_NUMERICALRESPONSE: NO HURT

## 2025-07-10 NOTE — H&P
V2.0  History and Physical      Name:  Osmar Mckenna /Age/Sex: 1945  (80 y.o. male)   MRN & CSN:  [id number] & [id number] Encounter Date/Time: 7/10/2025 12:29 AM EDT   Location:  [facility] PCP: Masoud Garcia MD       Hospital Day: 2    Assessment and Plan:   Osmar Mckenna is a 80 y.o. male with a pmh of hypertension, HFpEF, paroxysmal atrial fibrillation on Eliquis, pacemaker in place,  asthma,, type II DM  who presents with Hepatic abscess    Hospital Problems           Last Modified POA    * (Principal) Hepatic abscess 2025 Yes     Hepatic drain malfunction -unable to flush tubing  Hepatic abscess of right lobe -persisting in size despite drain  Recent bacteremia with Klebsiella secondary to hepatic abscess -continues on outpatient IV Rocephin at home through left PICC -therapy to be continue until 2025 and oral Flagyl 500 3 times daily through 2025  Hypertension -on nifedipine, lisinopril  Paroxysmal A-fib -on sotalol for rhythm control and Eliquis for AC  Pacemaker in place  Type II DM -on glipizide at home  Asthma -not in exacerbation -on Singulair      Plan:  Admit under observation  Consult IR for evaluation of hepatic drain  Continue Rocephin and Flagyl for hepatic abscess treatment  Low-dose SSI  Hold Eliquis for the time being until IR can evaluate and let us know if AC needs to continue to be on hold for a procedure  Start prophylactic anticoagulation with Lovenox  Consider ID consult given the persistent size of the hepatic abscess  Continue home meds as ordered  N.p.o. for potential procedure tomorrow    Disposition:   Current Living situation: Home  Expected Disposition: Home  Estimated D/C: 1 to 2-day    Diet Diet NPO   DVT Prophylaxis [] Lovenox, []  Heparin, [] SCDs, [] Ambulation,  [] Eliquis, [] Xarelto, [] Coumadin   Code Status Full Code   Surrogate Decision Maker/ POA Self     Personally reviewed Lab Studies and Imaging     Discussed

## 2025-07-10 NOTE — PROGRESS NOTES
TRANSFER - OUT REPORT:    Verbal report given to FELA Mcdowell on Osmar Mckenna being transferred to PCU for routine post-op       Report consisted of patient's Situation, Background, Assessment and   Recommendations(SBAR).     Information from the following report(s) Nurse Handoff Report and Event Log was reviewed with the receiving nurse.    Opportunity for questions and clarification was provided.      Patient transported with:   Registered Nurse

## 2025-07-10 NOTE — ED PROVIDER NOTES
EMERGENCY MEDICINE ATTENDING NOTE  Colin Estevez Jr., DO, FACEP, FAAEM        CHIEF COMPLAINT  Chief Complaint   Patient presents with    Hepatic Drain Issue     Pt presents to the ED with c/o decreased output in hepatic drainage system. Pt states he was diagnosed with a liver abscess in June and a drain was placed. Pt states he noticed a decrease in output yesterday and today. Pt states \"its closed up, I tried to open it up and I saw my PCP today and he couldn't get it open either.\" Pt called home health nurse and was told to come to the ED for evaluation. Pt has no other complaints. Afebrile and VSS in triage.         HISTORY OF PRESENT ILLNESS  Osmardanyelle Mckenna is a 80 y.o. male who presents to the ED for evaluation of issue with hepatic drain.  Patient has a drain in place that was placed last month after he is found to have sepsis from a liver abscess.  It has been draining since then however since yesterday noticed only small amount of drainage.  They are concerned that it was no longer working.  Tried to take off the bag but the adapter is stuck in place and will not come off.  There was primary doctor who tried to get it off but could not send him here for evaluation.  Patient states he is not having any more pain or any other issues or concerns.    Nursing/triage notes reviewed.  No other complaints, modifying factors or associated symptoms.     REVIEW OF SYSTEMS:  All systems are reviewed and are negative unless noted in the HPI.    PAST MEDICAL HISTORY  Past Medical History:   Diagnosis Date    Diabetes mellitus (HCC)     Hypertension        SURGICAL HISTORY  Past Surgical History:   Procedure Laterality Date    CARDIAC PROCEDURE Left 6/23/2025    Left heart cath / coronary angiography performed by Sebastian Alanis MD at [facility] CARDIAC CATH LAB    CT ABSCESS DRAINAGE SOFT TISSUE  6/28/2025    CT ABSCESS DRAINAGE SOFT TISSUE 6/28/2025 [facility] CT SCAN    PACEMAKER PLACEMENT         FAMILY  HISTORY  History reviewed. No pertinent family history.    SOCIAL HISTORY  Social History     Socioeconomic History    Marital status: Single     Spouse name: Not on file    Number of children: Not on file    Years of education: Not on file    Highest education level: Not on file   Occupational History    Not on file   Tobacco Use    Smoking status: Former     Average packs/day: 0.5 packs/day for 27.0 years (13.5 ttl pk-yrs)     Types: Cigarettes     Start date: 1964     Passive exposure: Never    Smokeless tobacco: Never   Vaping Use    Vaping status: Never Used   Substance and Sexual Activity    Alcohol use: Not Currently    Drug use: Not Currently    Sexual activity: Not on file   Other Topics Concern    Not on file   Social History Narrative    Not on file     Social Drivers of Health     Financial Resource Strain: Patient Declined (3/27/2023)    Received from [employer]    Overall Financial Resource Strain (CARDIA)     Difficulty of Paying Living Expenses: Patient declined   Food Insecurity: No Food Insecurity (6/26/2025)    Hunger Vital Sign     Worried About Running Out of Food in the Last Year: Never true     Ran Out of Food in the Last Year: Never true   Transportation Needs: No Transportation Needs (6/26/2025)    PRAPARE - Transportation     Lack of Transportation (Medical): No     Lack of Transportation (Non-Medical): No   Physical Activity: Patient Declined (3/27/2023)    Received from [employer]    Exercise Vital Sign     Days of Exercise per Week: Patient declined     Minutes of Exercise per Session: Patient declined   Stress: Patient Declined (3/27/2023)    Received from [employer]    [employer] [place] [employer] - Occupational Stress Questionnaire     Feeling of Stress : Patient declined   Social Connections: Unknown (3/27/2023)    Received from [employer]    Social Connection and Isolation Panel [NHANES]

## 2025-07-10 NOTE — ED NOTES
ED to Inpatient Handoff SBAR    Patient Name: Osmar Mckenna   :  1945  80 y.o.   MRN:  [id number]  Preferred Name    ED Room #:    Family/Caregiver Present yes     Chief Complaint Hepatic Drain Issue (Pt presents to the ED with c/o decreased output in hepatic drainage system. Pt states he was diagnosed with a liver abscess in  and a drain was placed. Pt states he noticed a decrease in output yesterday and today. Pt states \"its closed up, I tried to open it up and I saw my PCP today and he couldn't get it open either.\" Pt called home health nurse and was told to come to the ED for evaluation. Pt has no other complaints. Afebrile and VSS in triage. )       Restraints no   Sitter no   Sepsis Risk Score    Isolation No active isolations   Fall Risk Assessment Presents to emergency department  because of falls (Syncope, seizure, or loss of consciousness): No, Age > 70: Yes, Altered Mental Status, Intoxication with alcohol or substance confusion (Disorientation, impaired judgment, poor safety awaremess, or inability to follow instructions): No, Impaired Mobility: Ambulates or transfers with assistive devices or assistance; Unable to ambulate or transer.: No, Nursing Judgement: Yes     Situation  Code Status: Full Code Limited Code details: Intubation/Re-intubation No Comment; Defibrillation/Cardioversion No Comment; Chest Compressions No Comment; Resuscitative Medications No Comment; Other No Comment.    Allergies: Penicillins  Weight: Patient Vitals for the past 96 hrs (Last 3 readings):   Weight   25 1932 83.5 kg (184 lb 1.4 oz)     Arrived from: home  Hospital Problem/Diagnosis:  Principal Problem:    Hepatic abscess  Resolved Problems:    * No resolved hospital problems. *    Imaging:   CT ABDOMEN PELVIS W IV CONTRAST Additional Contrast? None   Final Result      1.  Right hepatic lobe abscess appears overall similar in size to the prior study with unchanged positioning of pigtail drainage

## 2025-07-10 NOTE — PLAN OF CARE
Patient admitted to room 3210 from ED. Patient oriented to room, call light, bed rails, phone, lights and bathroom. Patient instructed about the schedule of the day including: vital sign frequency, lab draws, possible tests, frequency of MD and staff rounds, daily weights, I &O's and prescribed diet. Telemetry box in place, patient aware of placement and reason. Bed locked, in lowest position, side rails up 3/4 , call light within reach.

## 2025-07-10 NOTE — PROGRESS NOTES
Pt receives discharge instructions on drain and need for daily 10cc flush (pt sent home with supplies). All questions answered. Education done with teachback method. Significant other packed pt belongings at bedside. Pt left after eating dinner. IV PICC remained in place and dressing clean, dry and intact.

## 2025-07-10 NOTE — PROGRESS NOTES
4 Eyes Skin Assessment     NAME:  Osmar Mckenna  YOB: 1945  MEDICAL RECORD NUMBER:  [id number]    The patient is being assessed for  Admission    I agree that at least one RN has performed a thorough Head to Toe Skin Assessment on the patient. ALL assessment sites listed below have been assessed.      Areas assessed by both nurses:    Head, Face, Ears, Shoulders, Back, Chest, Arms, Elbows, Hands, Sacrum. Buttock, Coccyx, Ischium, and Legs. Feet and Heels        Does the Patient have a Wound? No noted wound(s)       Nima Prevention initiated by RN: Yes  Wound Care Orders initiated by RN: No    Pressure Injury (Stage 3,4, Unstageable, DTI, NWPT, and Complex wounds) if present, place Wound referral order by RN under : No    New Ostomies, if present place, Ostomy referral order under : No  hepatic drain    Nurse 1 eSignature: Electronically signed by Maria Elena Cabrera RN on 7/10/25 at 12:29 AM EDT    **SHARE this note so that the co-signing nurse can place an eSignature**    Nurse 2 eSignature: {Esignature:[id number]}

## 2025-07-10 NOTE — DISCHARGE SUMMARY
Hospital Medicine Discharge Summary    Patient: Osmar Mckenna   : 1945     Hospital:  [facility]  Admit Date: 2025   Discharge Date:   7/10/25   Disposition:  Home or Home w/ [facility]   Code status:  Full  Condition at Discharge: Stable  Primary Care Provider: Masoud Garcia MD    Admitting Provider: Florentin Bone DO  Discharge Provider: Kelly Knowles, APRN - CNP     Discharge Diagnoses:      Active Hospital Problems    Diagnosis     Postoperative surgical complication involving digestive system associated with digestive system procedure [K91.89]     Hepatic abscess [K75.0]    Chief Admission Complaint:    Hepatic drain malfunction         Subjective:  EMR and notes reviewed pt seen and examined, Wife at bedside. Lying in bed in NAD. Denies complaints.      Presenting Admission History:     \" Osmar Mckenna is a 80 y.o. male with pmh of  hypertension, HFpEF, paroxysmal atrial fibrillation on Eliquis, pacemaker in place,  asthma,, type II DMwho presents with hepatic drain malfunction     Patient was seen in the emergency department with his wife at bedside.  Patient comes to the emergency department today because of hepatic drain malfunction for the last 2 days the drain has not been putting anything out.  There appears to be debris in the tubing and despite aggressive flushing cannot dislodge it.  The reason the patient has a hepatic drain is because of a hepatic abscess that he developed about a week and a half ago and was admitted at our hospital.  He was also found to have bacteremia with Klebsiella which also grew in the hepatic abscess.  CT scan today showed that the right hepatic abscess is still persisting without significant decrease in size.  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    Overall the patient feels much much better compared to when he was in the hospital.  He is eating and drinking more and

## 2025-07-10 NOTE — PROGRESS NOTES
[facility] Medicine Progress Note  V 5.17      Date of Admission: 7/9/2025    Hospital Day: 2      Chief Admission Complaint:    Hepatic drain malfunction       Subjective:  EMR and notes reviewed pt seen and examined, Wife at bedside. Lying in bed in NAD. Denies complaints.     Presenting Admission History:     \" Osmar Mckenna is a 80 y.o. male with pmh of  hypertension, HFpEF, paroxysmal atrial fibrillation on Eliquis, pacemaker in place,  asthma,, type II DMwho presents with hepatic drain malfunction     Patient was seen in the emergency department with his wife at bedside.  Patient comes to the emergency department today because of hepatic drain malfunction for the last 2 days the drain has not been putting anything out.  There appears to be debris in the tubing and despite aggressive flushing cannot dislodge it.  The reason the patient has a hepatic drain is because of a hepatic abscess that he developed about a week and a half ago and was admitted at our hospital.  He was also found to have bacteremia with Klebsiella which also grew in the hepatic abscess.  CT scan today showed that the right hepatic abscess is still persisting without significant decrease in size.  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    Overall the patient feels much much better compared to when he was in the hospital.  He is eating and drinking more and getting his energy back.  Lab evaluation was largely unremarkable including a normal white blood cell count.  The patient is afebrile.  Patient is still receiving IV ceftriaxone through a left PICC at home.  \"          Assessment/Plan:      Hepatic drain malfunction in the setting of treatment of hepatic abscess or the right lobe. PT has drain placed and was receiving OP  ABX via PICC   - CT of abd in the ED with Right hepatic lobe abscess appears overall similar in size to the prior study with unchanged positioning of  Treatment (any 1)    [] Drugs/treatments that require intensive monitoring for toxicity    [x] IV ABX (Vancomycin, Aminoglycosides, etc)     [] Post-Cath/Contrast study requiring serial monitoring    [] IV Narcotic analgesia    [] Aggressive IV diuresis    [] Hypertonic Saline    [] Critical electrolyte abnormalities requiring IV replacement    [] Insulin - Scheduled/SSI or Insulin gtt    [] Anticoagulation (Heparin gtt or Coumadin - other anticoagulants in special circumstances)    [] Cardiac Medications (IV Amiodarone/Diltiazem, Tikosyn, etc)    [] Hemodialysis    [] Other -    [] Change in code status    [] Decision to escalate care    [x] Major surgery/procedure with associated risk factors    --------------------------------------------------  C. Data (any 2)    [] Data Review (any 3)    [x] Consultant/subspecialist notes from yesterday/today    [x] All available current labs reviewed interpreted for clinical significance    [x] Appropriate follow-up labs were ordered  [] Collateral history obtained     [] Independent Interpretation of tests (any 1)    [] Telemetry (Rhythm Strip) personally reviewed and interpreted        [] Imaging personally reviewed and interpreted     [x] Discussion (any 1)  [x] Multi-Disciplinary Rounds with Case Management  [] Discussed management of the case with           Labs:  Personally reviewed on 7/10/2025 and interpreted for clinical significance as documented above.     Recent Labs     07/07/25  1445 07/09/25  2136 07/10/25  0418   WBC 9.2 8.6 8.4   HGB 12.6* 13.0* 12.7*   HCT 37.5* 39.7* 38.4*   * 494* 424     Recent Labs     07/07/25  1445 07/09/25  2136 07/10/25  0418    141 141   K 4.5 4.2 3.8    104 105   CO2 29 23 23   BUN 19 16 14   CREATININE 0.83 0.9 0.8   CALCIUM 8.7 9.5 9.1     No results for input(s): \"PROBNP\", \"TROPHS\" in the last 72 hours.  No results for input(s): \"LABA1C\" in the last 72 hours.  Recent Labs     07/07/25  1445 07/09/25  0190

## 2025-07-10 NOTE — PROCEDURES
Interventional Radiology Post Procedure    Date: 7/10/2025    Physician: Billy Laurent MD    Pre-op Diagnosis: hepatic abscess    Post-op Diagnosis: same    Variation from Planned Procedure: None       Findings: successful drain upsize from 10F to 12F    Patient condition: Stable    Estimated Blood Loss: 1 cc    Specimens:  none    Plan:   - flush catheter with 10cc saline daily  - sinogram with catheter exchange in 4-6 weeks unless output declines or patient unable to flush, at which time we would re-evaluate      Signed,  Masood Laurent MD  12:19 PM  7/10/2025

## 2025-07-10 NOTE — CARE COORDINATION
Case Management -  Discharge Note      Patient Name: Osmar Mckenna                   YOB: 1945  Room: [facility]-            Readmission Risk (Low < 19, Mod (19-27), High > 27): Readmission Risk Score: 16.4    Current PCP: Masoud Garcia MD      (IMM) Important Message from Medicare:    Has pt received appropriate compliance notices before being discharged if required: yes  Compliance doc:  [] 2nd IMM; [] Code 44 [x] Joel  Date Given: 7/9/2025 Given By: Chace    PT AM-PAC:   /24  OT AM-PAC:   /24    Patient/patient representative has been educated on the benefits of hhc as well as the possible risks of declining recommended services. Patient/patient representative has acknowledged the information provided and decided on the following discharge plan. Patient/ patient representative has been provided freedom of choice regarding service provider, supported by basic dialogue that supports the patient's individualized plan of care/goals.    Home Care Information:   Is patient resuming current home health care services: Yes     Home Care Agency:     Discharging to Facility/ Agency   Name: [facility]   Address: [address] [place] [address]   Phone: [phone]     Discharging to Facility/ Agency   Name: [facility]   Address: [address] [place] [address] [address] [place] [address]   Phone: [phone]                 Services: pt, ot sn  Home Health Order Obtained: Yes    Home health agency notified of discharge.       [facility] agency notified of discharge:  [x] Yes [] No  [] NA    Family notified of discharge:  [x] Yes  [] No  [] NA    Facility notified of discharge:  [] Yes  [] No  [x] NA    Pt is being discharged with Outpt IV Antibiotics  [x] Yes [] No  [] NA  If yes, make sure LIBERTY is faxed to [facility] agency, and meds are called in to pharmacy by RN from LIBERTY orders only.      Financial    Payor: MEDICARE / Plan: MEDICARE PART A AND B / Product Type: *No

## 2025-07-10 NOTE — DISCHARGE INSTR - COC
Continuity of Care Form    Patient Name: Osmar Mckenna   :  1945  MRN:  [id number]    Admit date:  2025  Discharge date:  25    Code Status Order: Full Code   Advance Directives:     Admitting Physician:  Florentin Bone DO  PCP: Masoud Garcia MD    Discharging Nurse: [facility] Unit/Room#: 3210/3210-01  Discharging Unit Phone Number: [phone]    Emergency Contact:   Extended Emergency Contact Information  Primary Emergency Contact: CAITY PARIS  Home Phone: [phone]  Mobile Phone: [phone]  Relation: Girlfriend  Preferred language: English   needed? No  Secondary Emergency Contact: SinghMeagan  Mobile Phone: [phone]  Relation: Child    Past Surgical History:  Past Surgical History:   Procedure Laterality Date    CARDIAC PROCEDURE Left 2025    Left heart cath / coronary angiography performed by Sebastian Alanis MD at [facility] CARDIAC CATH LAB    CT ABSCESS DRAINAGE SOFT TISSUE  2025    CT ABSCESS DRAINAGE SOFT TISSUE 2025 [facility] CT SCAN    PACEMAKER PLACEMENT         Immunization History:     There is no immunization history on file for this patient.    Active Problems:  Patient Active Problem List   Diagnosis Code    Acute gastroenteritis K52.9    Lung nodules R91.8    Former tobacco use Z87.891    COLEMAN (dyspnea on exertion) R06.09    Angina pectoris I20.9    Coronary artery disease involving native heart with angina pectoris I25.119    Pacemaker Z95.0    Paroxysmal atrial fibrillation (HCC) I48.0    Chest pain R07.9    Liver lesion K76.9    Lightheadedness R42    Flank pain R10.9    Liver mass R16.0    Sepsis without acute organ dysfunction (HCC) A41.9    Klebsiella sepsis (HCC) A41.59    Bacteremia due to Klebsiella pneumoniae R78.81, B96.1    Liver abscess K75.0    Fever and chills R50.9    Abnormal CT of the abdomen R93.5    Cardiac pacemaker in situ Z95.0    CRP elevated R79.82    Elevated erythrocyte sedimentation rate R70.0     Hepatic abscess K75.0    Postoperative surgical complication involving digestive system associated with digestive system procedure K91.89       Isolation/Infection:   Isolation            No Isolation          Patient Infection Status    None to display              Nurse Assessment:  Last Vital Signs: /70   Pulse 85   Temp 97.7 °F (36.5 °C) (Oral)   Resp 18   Ht 1.626 m (5' 4\")   Wt 83.5 kg (184 lb 1.4 oz)   SpO2 99%   BMI 31.60 kg/m²     Last documented pain score (0-10 scale): Pain Level: 0  Last Weight:   Wt Readings from Last 1 Encounters:   07/09/25 83.5 kg (184 lb 1.4 oz)     Mental Status:  oriented and alert    IV Access:  - PICC - site  L Upper Arm, insertion date: 7/1/25    Nursing Mobility/ADLs:  Walking   Independent  Transfer  Independent  Bathing  Independent  Dressing  Independent  Toileting  Independent  Feeding  Independent  Med Admin  Independent  Med Delivery   none    Wound Care Documentation and Therapy:        Elimination:  Continence:   Bowel: Yes  Bladder: Yes  Urinary Catheter: None   Colostomy/Ileostomy/Ileal Conduit: No       Date of Last BM: 7/10/25    Intake/Output Summary (Last 24 hours) at 7/10/2025 1140  Last data filed at 7/10/2025 0430  Gross per 24 hour   Intake 10 ml   Output --   Net 10 ml     I/O last 3 completed shifts:  In: 10 [I.V.:10]  Out: -     Safety Concerns:     None    Impairments/Disabilities:      None    Nutrition Therapy:  Current Nutrition Therapy:   - Oral Diet:  General    Routes of Feeding: Oral  Liquids:   Daily Fluid Restriction: no  Last Modified Barium Swallow with Video (Video Swallowing Test): not done    Treatments at the Time of Hospital Discharge:   Respiratory Treatments:   Oxygen Therapy:  is not on home oxygen therapy.  Ventilator:    - No ventilator support    Rehab Therapies: Physical Therapy, Occupational Therapy, and SN  Weight Bearing Status/Restrictions: No weight bearing restrictions  Other Medical Equipment (for information

## 2025-07-10 NOTE — PLAN OF CARE
Problem: Chronic Conditions and Co-morbidities  Goal: Patient's chronic conditions and co-morbidity symptoms are monitored and maintained or improved  7/10/2025 1038 by July Solitario RN  Outcome: Progressing  7/10/2025 0039 by Maria Elena Cabrera RN  Outcome: Progressing  Flowsheets (Taken 7/10/2025 0019)  Care Plan - Patient's Chronic Conditions and Co-Morbidity Symptoms are Monitored and Maintained or Improved:   Monitor and assess patient's chronic conditions and comorbid symptoms for stability, deterioration, or improvement   Collaborate with multidisciplinary team to address chronic and comorbid conditions and prevent exacerbation or deterioration   Update acute care plan with appropriate goals if chronic or comorbid symptoms are exacerbated and prevent overall improvement and discharge     Problem: Discharge Planning  Goal: Discharge to home or other facility with appropriate resources  7/10/2025 1038 by July Solitario RN  Outcome: Progressing  7/10/2025 0039 by Maria Elena Cabrera RN  Outcome: Progressing  Flowsheets (Taken 7/10/2025 0019)  Discharge to home or other facility with appropriate resources:   Identify barriers to discharge with patient and caregiver   Arrange for needed discharge resources and transportation as appropriate   Identify discharge learning needs (meds, wound care, etc)   Arrange for interpreters to assist at discharge as needed   Refer to discharge planning if patient needs post-hospital services based on physician order or complex needs related to functional status, cognitive ability or social support system     Problem: Safety - Adult  Goal: Free from fall injury  7/10/2025 1038 by July Solitario RN  Outcome: Progressing  7/10/2025 0039 by Maria Elena Cabrera RN  Outcome: Progressing  Flowsheets (Taken 7/10/2025 0019)  Free From Fall Injury:   Instruct family/caregiver on patient safety   Based on caregiver fall risk screen, instruct family/caregiver to ask for

## 2025-07-10 NOTE — CONSULTS
General Surgery Consult Note      Osmar Mckenna   : 1945 MRN: [id number]  Date of Admission: 2025  Admitting Physician:Florentin Bone DO  Primary Care Physician: Masoud Garcia MD  Consulting Physician: Dr. Estevez    Reason for Consult: Nonfunctioning PERC drain    Chief complaint:    Chief Complaint   Patient presents with    Hepatic Drain Issue     Pt presents to the ED with c/o decreased output in hepatic drainage system. Pt states he was diagnosed with a liver abscess in  and a drain was placed. Pt states he noticed a decrease in output yesterday and today. Pt states \"its closed up, I tried to open it up and I saw my PCP today and he couldn't get it open either.\" Pt called home health nurse and was told to come to the ED for evaluation. Pt has no other complaints. Afebrile and VSS in triage.        Diagnosis Present on Admission:   Hepatic abscess      History of Present Illness  Osmar Mckenna is a 80 y.o. male admitted on 2025 for nonfunctioning PERC drain.  He had percutaneous drain placed in hepatic abscess 2025.  Discharged home on IV antibiotics.  May have been unable to remove suction bag from drain since .  Scant amount of purulence in bag.  Denies pain.  Tolerating regular diet      Past Medical History:   Diagnosis Date    Diabetes mellitus (HCC)     Hypertension      Past Surgical History:   Procedure Laterality Date    CARDIAC PROCEDURE Left 2025    Left heart cath / coronary angiography performed by Sebastian Alanis MD at [facility] CARDIAC CATH LAB    CT ABSCESS DRAINAGE SOFT TISSUE  2025    CT ABSCESS DRAINAGE SOFT TISSUE 2025 [facility] CT SCAN    PACEMAKER PLACEMENT       History reviewed. No pertinent family history.  Social History     Socioeconomic History    Marital status: Single     Spouse name: Not on file    Number of children: Not on file    Years of education: Not on file    Highest education level: Not on file    Occupational History    Not on file   Tobacco Use    Smoking status: Former     Average packs/day: 0.5 packs/day for 27.0 years (13.5 ttl pk-yrs)     Types: Cigarettes     Start date: 1964     Passive exposure: Never    Smokeless tobacco: Never   Vaping Use    Vaping status: Never Used   Substance and Sexual Activity    Alcohol use: Not Currently    Drug use: Not Currently    Sexual activity: Not on file   Other Topics Concern    Not on file   Social History Narrative    Not on file     Social Drivers of Health     Financial Resource Strain: Patient Declined (3/27/2023)    Received from [facility]    Overall Financial Resource Strain (CARDIA)     Difficulty of Paying Living Expenses: Patient declined   Food Insecurity: No Food Insecurity (7/10/2025)    Hunger Vital Sign     Worried About Running Out of Food in the Last Year: Never true     Ran Out of Food in the Last Year: Never true   Transportation Needs: No Transportation Needs (7/10/2025)    PRAPARE - Transportation     Lack of Transportation (Medical): No     Lack of Transportation (Non-Medical): No   Physical Activity: Patient Declined (3/27/2023)    Received from [facility]    Exercise Vital Sign     Days of Exercise per Week: Patient declined     Minutes of Exercise per Session: Patient declined   Stress: Patient Declined (3/27/2023)    Received from [facility]    [place] citizen [place] [employer] - Occupational Stress Questionnaire     Feeling of Stress : Patient declined   Social Connections: Unknown (3/27/2023)    Received from [facility]    Social Connection and Isolation Panel [NHANES]     Frequency of Communication with Friends and Family: Patient declined     Frequency of Social Gatherings with Friends and Family: Not on file     Attends Mosque Services: Not on file     Active Member of Clubs or Organizations: Not on file     Attends Club or Organization

## 2025-07-10 NOTE — CARE COORDINATION
Case Management Assessment  Initial Evaluation    Date/Time of Evaluation: 7/10/2025 12:21 PM  Assessment Completed by: HALLEY Mario    If patient is discharged prior to next notation, then this note serves as note for discharge by case management.    Patient Name: Osmar Mckenna                   YOB: 1945  Diagnosis: Hepatic abscess [K75.0]  Postoperative surgical complication involving digestive system associated with digestive system procedure, unspecified complication [K91.89]                   Date / Time: 7/9/2025  9:29 PM    Patient Admission Status: Observation   Readmission Risk (Low < 19, Mod (19-27), High > 27): Readmission Risk Score: 16.4    Current PCP: Masoud Garcia MD  PCP verified by CM? Yes    Chart Reviewed: Yes      History Provided by: Patient  Patient Orientation: Alert and Oriented    Patient Cognition: Alert    Hospitalization in the last 30 days (Readmission):  Yes    If yes, Readmission Assessment in  Navigator will be completed.    Advance Directives:      Code Status: Full Code   Patient's Primary Decision Maker is: Legal Next of Kin    Primary Decision Maker: CAITY PARIS - Girlfriend - [phone]    Discharge Planning:    Patient lives with: Alone Type of Home: House  Primary Care Giver: Spouse  Patient Support Systems include: Spouse/Significant Other   Current Financial resources: Medicare  Current community resources: ECF/Home Care  Current services prior to admission: Home Care            Current DME:              Type of Home Care services:  OT, PT, Nursing Services    ADLS  Prior functional level: Independent in ADLs/IADLs  Current functional level: Independent in ADLs/IADLs    PT AM-PAC:   /24  OT AM-PAC:   /24    Family can provide assistance at DC: Yes  Would you like Case Management to discuss the discharge plan with any other family members/significant others, and if so, who? No  Plans to Return to Present Housing: Yes  Other

## 2025-07-15 ENCOUNTER — TELEPHONE (OUTPATIENT)
Dept: INFECTIOUS DISEASES | Age: 80
End: 2025-07-15

## 2025-07-15 LAB
ALBUMIN SERPL-MCNC: 3.7 G/DL (ref 3.4–5)
ALBUMIN/GLOB SERPL: 1.2 {RATIO} (ref 1.1–2.2)
ALP SERPL-CCNC: 92 U/L (ref 40–129)
ALT SERPL-CCNC: 19 U/L (ref 10–40)
ANION GAP SERPL CALCULATED.3IONS-SCNC: 16 MMOL/L (ref 3–16)
AST SERPL-CCNC: 29 U/L (ref 15–37)
BASOPHILS # BLD: 0.1 K/UL (ref 0–0.2)
BASOPHILS NFR BLD: 1 %
BILIRUB SERPL-MCNC: 0.6 MG/DL (ref 0–1)
BUN SERPL-MCNC: 19 MG/DL (ref 7–20)
CALCIUM SERPL-MCNC: 10 MG/DL (ref 8.3–10.6)
CHLORIDE SERPL-SCNC: 102 MMOL/L (ref 99–110)
CO2 SERPL-SCNC: 20 MMOL/L (ref 21–32)
CREAT SERPL-MCNC: 0.8 MG/DL (ref 0.8–1.3)
CRP SERPL-MCNC: 12 MG/L (ref 0–5.1)
DEPRECATED RDW RBC AUTO: 14.5 % (ref 12.4–15.4)
EOSINOPHIL # BLD: 0.3 K/UL (ref 0–0.6)
EOSINOPHIL NFR BLD: 3.7 %
ERYTHROCYTE [SEDIMENTATION RATE] IN BLOOD BY WESTERGREN METHOD: 11 MM/HR (ref 0–20)
GFR SERPLBLD CREATININE-BSD FMLA CKD-EPI: 90 ML/MIN/{1.73_M2}
GLUCOSE SERPL-MCNC: 226 MG/DL (ref 70–99)
HCT VFR BLD AUTO: 37.6 % (ref 40.5–52.5)
HGB BLD-MCNC: 12.6 G/DL (ref 13.5–17.5)
LYMPHOCYTES # BLD: 1.2 K/UL (ref 1–5.1)
LYMPHOCYTES NFR BLD: 18.1 %
MCH RBC QN AUTO: 30.4 PG (ref 26–34)
MCHC RBC AUTO-ENTMCNC: 33.5 G/DL (ref 31–36)
MCV RBC AUTO: 91 FL (ref 80–100)
MONOCYTES # BLD: 0.4 K/UL (ref 0–1.3)
MONOCYTES NFR BLD: 5.8 %
NEUTROPHILS # BLD: 4.9 K/UL (ref 1.7–7.7)
NEUTROPHILS NFR BLD: 71.4 %
PLATELET # BLD AUTO: 442 K/UL (ref 135–450)
PMV BLD AUTO: 7.6 FL (ref 5–10.5)
POTASSIUM SERPL-SCNC: 4.1 MMOL/L (ref 3.5–5.1)
PROT SERPL-MCNC: 6.7 G/DL (ref 6.4–8.2)
RBC # BLD AUTO: 4.13 M/UL (ref 4.2–5.9)
SODIUM SERPL-SCNC: 138 MMOL/L (ref 136–145)
WBC # BLD AUTO: 6.8 K/UL (ref 4–11)

## 2025-07-15 NOTE — TELEPHONE ENCOUNTER
Vika pharmacist at [facility] verbalized understanding to start  on 7/18/25-7/25/25.

## 2025-07-17 ENCOUNTER — TELEPHONE (OUTPATIENT)
Dept: INFECTIOUS DISEASES | Age: 80
End: 2025-07-17

## 2025-07-17 DIAGNOSIS — R78.81 BACTEREMIA DUE TO KLEBSIELLA PNEUMONIAE: ICD-10-CM

## 2025-07-17 DIAGNOSIS — B96.1 BACTEREMIA DUE TO KLEBSIELLA PNEUMONIAE: ICD-10-CM

## 2025-07-17 DIAGNOSIS — A41.59 KLEBSIELLA SEPSIS (HCC): ICD-10-CM

## 2025-07-17 DIAGNOSIS — K75.0 LIVER ABSCESS: Primary | ICD-10-CM

## 2025-07-17 LAB
EKG DIAGNOSIS: NORMAL
EKG Q-T INTERVAL: 368 MS
EKG QRS DURATION: 94 MS
EKG QTC CALCULATION (BAZETT): 474 MS
EKG R AXIS: -67 DEGREES
EKG T AXIS: 15 DEGREES
EKG VENTRICULAR RATE: 100 BPM

## 2025-07-17 NOTE — TELEPHONE ENCOUNTER
Voicemail with updated plan left for pt and girlfriend. Number to central scheduling give for CT.    Girlfriend returned call and verbalized understanding of plan.    Baldemari pharmacist with [employer] verbalized understanding to extend IV abx until 7/25/25.

## 2025-07-22 LAB
ALBUMIN SERPL-MCNC: 3.7 G/DL (ref 3.4–5)
ALBUMIN/GLOB SERPL: 1.2 {RATIO} (ref 1.1–2.2)
ALP SERPL-CCNC: 84 U/L (ref 40–129)
ALT SERPL-CCNC: 17 U/L (ref 10–40)
ANION GAP SERPL CALCULATED.3IONS-SCNC: 13 MMOL/L (ref 3–16)
AST SERPL-CCNC: 30 U/L (ref 15–37)
BASOPHILS # BLD: 0 K/UL (ref 0–0.2)
BASOPHILS NFR BLD: 0.8 %
BILIRUB SERPL-MCNC: 0.7 MG/DL (ref 0–1)
BUN SERPL-MCNC: 15 MG/DL (ref 7–20)
CALCIUM SERPL-MCNC: 9.7 MG/DL (ref 8.3–10.6)
CHLORIDE SERPL-SCNC: 105 MMOL/L (ref 99–110)
CO2 SERPL-SCNC: 24 MMOL/L (ref 21–32)
CREAT SERPL-MCNC: 0.8 MG/DL (ref 0.8–1.3)
CRP SERPL-MCNC: 3.5 MG/L (ref 0–5.1)
DEPRECATED RDW RBC AUTO: 14.3 % (ref 12.4–15.4)
EOSINOPHIL # BLD: 0.4 K/UL (ref 0–0.6)
EOSINOPHIL NFR BLD: 7.7 %
ERYTHROCYTE [SEDIMENTATION RATE] IN BLOOD BY WESTERGREN METHOD: 10 MM/HR (ref 0–20)
GFR SERPLBLD CREATININE-BSD FMLA CKD-EPI: 90 ML/MIN/{1.73_M2}
GLUCOSE SERPL-MCNC: 114 MG/DL (ref 70–99)
HCT VFR BLD AUTO: 35.9 % (ref 40.5–52.5)
HGB BLD-MCNC: 12.7 G/DL (ref 13.5–17.5)
LYMPHOCYTES # BLD: 1.1 K/UL (ref 1–5.1)
LYMPHOCYTES NFR BLD: 22 %
MCH RBC QN AUTO: 31 PG (ref 26–34)
MCHC RBC AUTO-ENTMCNC: 35.3 G/DL (ref 31–36)
MCV RBC AUTO: 87.8 FL (ref 80–100)
MONOCYTES # BLD: 0.4 K/UL (ref 0–1.3)
MONOCYTES NFR BLD: 7.5 %
NEUTROPHILS # BLD: 3.1 K/UL (ref 1.7–7.7)
NEUTROPHILS NFR BLD: 62 %
PLATELET # BLD AUTO: 306 K/UL (ref 135–450)
PMV BLD AUTO: 7.3 FL (ref 5–10.5)
POTASSIUM SERPL-SCNC: 3.8 MMOL/L (ref 3.5–5.1)
PROT SERPL-MCNC: 6.9 G/DL (ref 6.4–8.2)
RBC # BLD AUTO: 4.09 M/UL (ref 4.2–5.9)
SODIUM SERPL-SCNC: 142 MMOL/L (ref 136–145)
WBC # BLD AUTO: 5 K/UL (ref 4–11)

## 2025-07-23 ENCOUNTER — HOSPITAL ENCOUNTER (OUTPATIENT)
Age: 80
Discharge: HOME | End: 2025-07-23
Attending: INTERNAL MEDICINE
Payer: MEDICARE

## 2025-07-23 DIAGNOSIS — K75.0 LIVER ABSCESS: ICD-10-CM

## 2025-07-23 DIAGNOSIS — A41.59 KLEBSIELLA SEPSIS (HCC): ICD-10-CM

## 2025-07-23 DIAGNOSIS — B96.1 BACTEREMIA DUE TO KLEBSIELLA PNEUMONIAE: ICD-10-CM

## 2025-07-23 DIAGNOSIS — R78.81 BACTEREMIA DUE TO KLEBSIELLA PNEUMONIAE: ICD-10-CM

## 2025-07-23 PROCEDURE — 74177 CT ABD & PELVIS W/CONTRAST: CPT

## 2025-07-23 PROCEDURE — 6360000004 HC RX CONTRAST MEDICATION: Performed by: INTERNAL MEDICINE

## 2025-07-23 RX ORDER — IOPAMIDOL 755 MG/ML
75 INJECTION, SOLUTION INTRAVASCULAR
Status: COMPLETED | OUTPATIENT
Start: 2025-07-23 | End: 2025-07-23

## 2025-07-23 RX ADMIN — IOPAMIDOL 75 ML: 755 INJECTION, SOLUTION INTRAVENOUS at 08:56

## 2025-07-24 ENCOUNTER — TELEPHONE (OUTPATIENT)
Dept: INFECTIOUS DISEASES | Age: 80
End: 2025-07-24

## 2025-07-24 NOTE — TELEPHONE ENCOUNTER
Vika pharmacist with [facility] verbalized understanding to extend IV abx until 8/1/25.    Pt and pt's girlfriend Sigrid verbalized understanding of plan.

## 2025-07-29 ENCOUNTER — TELEPHONE (OUTPATIENT)
Age: 80
End: 2025-07-29

## 2025-07-30 ENCOUNTER — TELEPHONE (OUTPATIENT)
Dept: INFECTIOUS DISEASES | Age: 80
End: 2025-07-30

## 2025-07-30 LAB
ALBUMIN SERPL-MCNC: 4 G/DL (ref 3.4–5)
ALBUMIN/GLOB SERPL: 1.4 {RATIO} (ref 1.1–2.2)
ALP SERPL-CCNC: 73 U/L (ref 40–129)
ALT SERPL-CCNC: 17 U/L (ref 10–40)
ANION GAP SERPL CALCULATED.3IONS-SCNC: 13 MMOL/L (ref 3–16)
AST SERPL-CCNC: 29 U/L (ref 15–37)
BASOPHILS # BLD: 0 K/UL (ref 0–0.2)
BASOPHILS NFR BLD: 0.6 %
BILIRUB SERPL-MCNC: 0.6 MG/DL (ref 0–1)
BUN SERPL-MCNC: 17 MG/DL (ref 7–20)
CALCIUM SERPL-MCNC: 9.5 MG/DL (ref 8.3–10.6)
CHLORIDE SERPL-SCNC: 103 MMOL/L (ref 99–110)
CO2 SERPL-SCNC: 22 MMOL/L (ref 21–32)
CREAT SERPL-MCNC: 0.7 MG/DL (ref 0.8–1.3)
CRP SERPL-MCNC: 5 MG/L (ref 0–5.1)
DEPRECATED RDW RBC AUTO: 15.3 % (ref 12.4–15.4)
EOSINOPHIL # BLD: 0.4 K/UL (ref 0–0.6)
EOSINOPHIL NFR BLD: 8.1 %
ERYTHROCYTE [SEDIMENTATION RATE] IN BLOOD BY WESTERGREN METHOD: 6 MM/HR (ref 0–20)
GFR SERPLBLD CREATININE-BSD FMLA CKD-EPI: >90 ML/MIN/{1.73_M2}
GLUCOSE SERPL-MCNC: 151 MG/DL (ref 70–99)
HCT VFR BLD AUTO: 36.4 % (ref 40.5–52.5)
HGB BLD-MCNC: 12.7 G/DL (ref 13.5–17.5)
LYMPHOCYTES # BLD: 1.1 K/UL (ref 1–5.1)
LYMPHOCYTES NFR BLD: 24.2 %
MCH RBC QN AUTO: 31.1 PG (ref 26–34)
MCHC RBC AUTO-ENTMCNC: 34.9 G/DL (ref 31–36)
MCV RBC AUTO: 89.1 FL (ref 80–100)
MONOCYTES # BLD: 0.4 K/UL (ref 0–1.3)
MONOCYTES NFR BLD: 8.6 %
NEUTROPHILS # BLD: 2.6 K/UL (ref 1.7–7.7)
NEUTROPHILS NFR BLD: 58.5 %
PLATELET # BLD AUTO: 301 K/UL (ref 135–450)
PMV BLD AUTO: 7.3 FL (ref 5–10.5)
POTASSIUM SERPL-SCNC: 3.8 MMOL/L (ref 3.5–5.1)
PROT SERPL-MCNC: 6.9 G/DL (ref 6.4–8.2)
RBC # BLD AUTO: 4.09 M/UL (ref 4.2–5.9)
SODIUM SERPL-SCNC: 138 MMOL/L (ref 136–145)
WBC # BLD AUTO: 4.5 K/UL (ref 4–11)

## 2025-07-30 NOTE — TELEPHONE ENCOUNTER
OPAT End  Call made to pharmacy  [facility]   Call made to [facility]   Call made to patient  yes  Will f/u in 1-2 days to verify line removal

## 2025-07-30 NOTE — TELEPHONE ENCOUNTER
D/c as planned thanks  Check if the drain is still in place he needs to check with  to see if this can come out

## 2025-08-01 NOTE — TELEPHONE ENCOUNTER
Called her back and let her know that I would find out what was going on and call her back.     Dr. Heck-Will you remove a hepatic drain in the office for this patient?   
L/m for Sigrid to call back.  Per Dr Heck he would like to know if it is still draining? If it is not then we can pull it but if still draining would like for it to stay in.    
Message was relayed to wife who verbalized understanding.   
Per Dr. Heck. He will look at images and see if it is ready to come out. Girlfriend was notified and let her know that we would let her know what he says.   
Pt girlfriend called asking to schedule and appt with Dr. Heck. Stated home care nurse came to see pt and asked who was removing the pump pt has. Stated I would go ahead and schedule appt but would send message back to clinical staff to make sure course of action is correct. Please advise  
Sigrid called back and stated that she drained it three times yesterday.  She stated that last week there was blood in the drain.  And now there is yellow and white stuff in it.  Dr. Heck is aware he is going to call Dr. Cohen.  Pt is done with medication on 8/1/2025.  We told Sigrid that the drain needs to stay in.  She verbalized understanding.    
Spoke with Dr. Heck and he stated that we will see him on 8/6/25 and might pull the drain.  He also talked with Dr Cohen and pt is done with medication.  No further action needed at this time.     
Statement Selected

## 2025-08-06 ENCOUNTER — OFFICE VISIT (OUTPATIENT)
Age: 80
End: 2025-08-06
Payer: MEDICARE

## 2025-08-06 VITALS
HEIGHT: 64 IN | WEIGHT: 185.63 LBS | OXYGEN SATURATION: 97 % | TEMPERATURE: 98.1 F | HEART RATE: 71 BPM | SYSTOLIC BLOOD PRESSURE: 137 MMHG | BODY MASS INDEX: 31.69 KG/M2 | DIASTOLIC BLOOD PRESSURE: 80 MMHG

## 2025-08-06 DIAGNOSIS — K75.0 LIVER ABSCESS: Primary | ICD-10-CM

## 2025-08-06 PROCEDURE — G8417 CALC BMI ABV UP PARAM F/U: HCPCS | Performed by: SURGERY

## 2025-08-06 PROCEDURE — G8427 DOCREV CUR MEDS BY ELIG CLIN: HCPCS | Performed by: SURGERY

## 2025-08-06 PROCEDURE — 1123F ACP DISCUSS/DSCN MKR DOCD: CPT | Performed by: SURGERY

## 2025-08-06 PROCEDURE — 1036F TOBACCO NON-USER: CPT | Performed by: SURGERY

## 2025-08-06 PROCEDURE — 1159F MED LIST DOCD IN RCRD: CPT | Performed by: SURGERY

## 2025-08-06 PROCEDURE — 99213 OFFICE O/P EST LOW 20 MIN: CPT | Performed by: SURGERY

## 2025-08-06 ASSESSMENT — ENCOUNTER SYMPTOMS: ABDOMINAL PAIN: 0

## (undated) DEVICE — PINNACLE R/O II INTRODUCER SHEATH WITH RADIOPAQUE MARKER: Brand: PINNACLE

## (undated) DEVICE — CATHETER ANGIO INFIN 038IN AMPLATZ 534545T

## (undated) DEVICE — KIT MIC INTRO OD5FR NDL L7CM OD21GA GWIRE L40CM OD0.018IN

## (undated) DEVICE — COVER US PRB W13XL244CM SURGICAL INTRAOPERATIVE W RUBBERBAND

## (undated) DEVICE — CATHETER ANGIO 5FR L100CM GRY S STL NYL JL 4.5 3 SEG BRAID L

## (undated) DEVICE — GUIDEWIRE VASC L150CM DIA0.035IN FLX END L7CM J 3MM PTFE

## (undated) DEVICE — ANGIOGRAPHY KIT CUSTOM LEFT HEART

## (undated) DEVICE — DEVICE VASC CLSR 5FR GRY W/ INTEGR SEAL 10ML LOK SYR

## (undated) DEVICE — Device

## (undated) DEVICE — CATH LAB PACK: Brand: MEDLINE INDUSTRIES, INC.

## (undated) DEVICE — CATHETER DIAG AD 5FR L100CM COR GRY HYDRPHLC NYL MPA 2 W/ 2